# Patient Record
Sex: FEMALE | Race: WHITE | NOT HISPANIC OR LATINO | Employment: FULL TIME | URBAN - METROPOLITAN AREA
[De-identification: names, ages, dates, MRNs, and addresses within clinical notes are randomized per-mention and may not be internally consistent; named-entity substitution may affect disease eponyms.]

---

## 2017-03-20 ENCOUNTER — OFFICE VISIT (OUTPATIENT)
Dept: FAMILY MEDICINE | Facility: CLINIC | Age: 27
End: 2017-03-20
Payer: COMMERCIAL

## 2017-03-20 VITALS
RESPIRATION RATE: 16 BRPM | TEMPERATURE: 98.4 F | OXYGEN SATURATION: 99 % | HEART RATE: 81 BPM | BODY MASS INDEX: 27.16 KG/M2 | HEIGHT: 65 IN | WEIGHT: 163 LBS | SYSTOLIC BLOOD PRESSURE: 120 MMHG | DIASTOLIC BLOOD PRESSURE: 80 MMHG

## 2017-03-20 DIAGNOSIS — R30.0 DYSURIA: Primary | ICD-10-CM

## 2017-03-20 DIAGNOSIS — Z01.419 WELL WOMAN EXAM WITH ROUTINE GYNECOLOGICAL EXAM: ICD-10-CM

## 2017-03-20 DIAGNOSIS — E03.9 HYPOTHYROIDISM, UNSPECIFIED TYPE: ICD-10-CM

## 2017-03-20 LAB
ALBUMIN UR-MCNC: NEGATIVE MG/DL
APPEARANCE UR: CLEAR
BILIRUB UR QL STRIP: NEGATIVE
COLOR UR AUTO: YELLOW
GLUCOSE UR STRIP-MCNC: NEGATIVE MG/DL
HGB UR QL STRIP: ABNORMAL
KETONES UR STRIP-MCNC: NEGATIVE MG/DL
LEUKOCYTE ESTERASE UR QL STRIP: NEGATIVE
MICRO REPORT STATUS: NORMAL
MUCOUS THREADS #/AREA URNS LPF: PRESENT /LPF
NITRATE UR QL: NEGATIVE
NON-SQ EPI CELLS #/AREA URNS LPF: ABNORMAL /LPF
PH UR STRIP: 6 PH (ref 5–7)
RBC #/AREA URNS AUTO: ABNORMAL /HPF (ref 0–2)
SP GR UR STRIP: >1.03 (ref 1–1.03)
SPECIMEN SOURCE: NORMAL
URN SPEC COLLECT METH UR: ABNORMAL
UROBILINOGEN UR STRIP-ACNC: 0.2 EU/DL (ref 0.2–1)
WBC #/AREA URNS AUTO: ABNORMAL /HPF (ref 0–2)
WET PREP SPEC: NORMAL

## 2017-03-20 PROCEDURE — 84443 ASSAY THYROID STIM HORMONE: CPT | Performed by: NURSE PRACTITIONER

## 2017-03-20 PROCEDURE — 99214 OFFICE O/P EST MOD 30 MIN: CPT | Performed by: NURSE PRACTITIONER

## 2017-03-20 PROCEDURE — 87210 SMEAR WET MOUNT SALINE/INK: CPT | Performed by: NURSE PRACTITIONER

## 2017-03-20 PROCEDURE — 81001 URINALYSIS AUTO W/SCOPE: CPT | Performed by: NURSE PRACTITIONER

## 2017-03-20 PROCEDURE — 36415 COLL VENOUS BLD VENIPUNCTURE: CPT | Performed by: NURSE PRACTITIONER

## 2017-03-20 RX ORDER — NORGESTIMATE AND ETHINYL ESTRADIOL 7DAYSX3 28
1 KIT ORAL DAILY
Qty: 90 TABLET | Refills: 3 | Status: SHIPPED | OUTPATIENT
Start: 2017-03-20 | End: 2018-02-26

## 2017-03-20 RX ORDER — NORGESTIMATE AND ETHINYL ESTRADIOL 7DAYSX3 28
1 KIT ORAL DAILY
Qty: 90 TABLET | Refills: 3 | Status: CANCELLED | OUTPATIENT
Start: 2017-03-20

## 2017-03-20 RX ORDER — ATOMOXETINE 80 MG/1
80 CAPSULE ORAL DAILY
Qty: 30 CAPSULE | Refills: 1 | COMMUNITY
Start: 2017-03-20 | End: 2017-08-25

## 2017-03-20 ASSESSMENT — ANXIETY QUESTIONNAIRES
1. FEELING NERVOUS, ANXIOUS, OR ON EDGE: MORE THAN HALF THE DAYS
5. BEING SO RESTLESS THAT IT IS HARD TO SIT STILL: NOT AT ALL
6. BECOMING EASILY ANNOYED OR IRRITABLE: NEARLY EVERY DAY
2. NOT BEING ABLE TO STOP OR CONTROL WORRYING: NEARLY EVERY DAY
7. FEELING AFRAID AS IF SOMETHING AWFUL MIGHT HAPPEN: MORE THAN HALF THE DAYS
3. WORRYING TOO MUCH ABOUT DIFFERENT THINGS: NEARLY EVERY DAY
IF YOU CHECKED OFF ANY PROBLEMS ON THIS QUESTIONNAIRE, HOW DIFFICULT HAVE THESE PROBLEMS MADE IT FOR YOU TO DO YOUR WORK, TAKE CARE OF THINGS AT HOME, OR GET ALONG WITH OTHER PEOPLE: VERY DIFFICULT
GAD7 TOTAL SCORE: 16

## 2017-03-20 ASSESSMENT — PATIENT HEALTH QUESTIONNAIRE - PHQ9: 5. POOR APPETITE OR OVEREATING: NEARLY EVERY DAY

## 2017-03-20 NOTE — PROGRESS NOTES
SUBJECTIVE:                                                    Gayathri Gomez is a 26 year old female who presents to clinic today for the following health issues:      Vaginal Symptoms      Duration: x 7 days    Description  burning and urgency, creamy discharge    Intensity:  moderate    Accompanying signs and symptoms (fever/dysuria/abdominal or back pain): None    History  Sexually active: yes, single partner, contraception - pill  Possibility of pregnancy: No  Recent antibiotic use: no     Precipitating or alleviating factors: None    Therapies tried and outcome: vagasil, with some relief   Gayathri is here with complaints of dysuria and vaginal discharge. Frustrated with previous providers that have misdiagnosed her in the past. Unsure if the discharge is from her period starting soon.   Sexually active with the same male partner for the past 8 years. Taking OCP and is considering stopping to try to get pregnant in the next 1-2 years.   In addition, started on levothyroxine and has not had her TSH level drawn in several years. Feels fine but is interested in the level.   Mental health issues managed by specialty.         Problem list and histories reviewed & adjusted, as indicated.  Additional history: none    Patient Active Problem List   Diagnosis     Anxiety     Social phobia     ADHD (attention deficit hyperactivity disorder)     Major depression in partial remission (H)     CARDIOVASCULAR SCREENING; LDL GOAL LESS THAN 160     History reviewed. No pertinent past surgical history.    Social History   Substance Use Topics     Smoking status: Current Some Day Smoker     Years: 5.00     Types: Cigarettes     Smokeless tobacco: Never Used     Alcohol use 0.0 oz/week     0 Standard drinks or equivalent per week      Comment: occ     Family History   Problem Relation Age of Onset     Breast Cancer Maternal Grandmother      Psychotic Disorder Mother      severe anxiety     Hypertension Father            Reviewed and  "updated as needed this visit by clinical staff       Reviewed and updated as needed this visit by Provider         ROS:  Constitutional, HEENT, cardiovascular, pulmonary, gi and gu systems are negative, except as otherwise noted.    OBJECTIVE:                                                    /80 (BP Location: Right arm, Patient Position: Chair, Cuff Size: Adult Large)  Pulse 81  Temp 98.4  F (36.9  C) (Oral)  Resp 16  Ht 5' 5\" (1.651 m)  Wt 163 lb (73.9 kg)  LMP 03/20/2017  SpO2 99%  Breastfeeding? No  BMI 27.12 kg/m2  Body mass index is 27.12 kg/(m^2).  GENERAL: healthy, alert and no distress  RESP: lungs clear to auscultation - no rales, rhonchi or wheezes  CV: regular rate and rhythm, normal S1 S2, no S3 or S4, no murmur, click or rub, no peripheral edema and peripheral pulses strong   (female): normal female external genitalia, normal urethral meatus, vaginal mucosa, normal cervix/adnexa/uterus without masses or discharge  MS: no gross musculoskeletal defects noted, no edema  PSYCH: mentation appears normal and anxious    Results for orders placed or performed in visit on 03/20/17 (from the past 24 hour(s))   *UA reflex to Microscopic and Culture ([facility] (except [place] and [place])   Result Value Ref Range    Color Urine Yellow     Appearance Urine Clear     Glucose Urine Negative NEG mg/dL    Bilirubin Urine Negative NEG    Ketones Urine Negative NEG mg/dL    Specific Gravity Urine >1.030 1.003 - 1.035    Blood Urine Small (A) NEG    pH Urine 6.0 5.0 - 7.0 pH    Protein Albumin Urine Negative NEG mg/dL    Urobilinogen Urine 0.2 0.2 - 1.0 EU/dL    Nitrite Urine Negative NEG    Leukocyte Esterase Urine Negative NEG    Source Midstream Urine    Urine Microscopic   Result Value Ref Range    WBC Urine O - 2 0 - 2 /HPF    RBC Urine 2-5 (A) 0 - 2 /HPF    Squamous Epithelial /LPF Urine Moderate (A) FEW /LPF    Mucous Urine Present (A) NEG /LPF   Wet prep   Result Value Ref " [place]    Specimen Description Vagina     Wet Prep       No yeast seen  No clue cells seen  No Trichomonas seen      Micro Report Status FINAL 03/20/2017         ASSESSMENT/PLAN:                                                            1. Dysuria  Urine shows trace blood and likely from her menses. Wet prep is within normal limits. Patient is reassured.   - *UA reflex to Microscopic and Culture ([facility] (except [place] and [place])  - Wet prep  - Urine Microscopic    2.Management of contraception  Last pap was less than one year ago. OCP refilled at this time.   - norgestim-eth estrad triphasic (TRI-PREVIFEM) 0.18/0.215/0.25 MG-35 MCG per tablet; Take 1 tablet by mouth daily  Dispense: 90 tablet; Refill: 3    3. Hypothyroidism, unspecified type  TSH level drawn today. Will adjust medication as needed.   - TSH with free T4 reflex    Follow up as needed. Recommended physical exam in May.     Irena Preciado, NP  [facility]

## 2017-03-20 NOTE — NURSING NOTE
"Chief Complaint   Patient presents with     UTI     Vaginal Problem     Blood Draw     check thyroid       Initial /80 (BP Location: Right arm, Patient Position: Chair, Cuff Size: Adult Large)  Pulse 81  Temp 98.4  F (36.9  C) (Oral)  Resp 16  Ht 5' 5\" (1.651 m)  Wt 163 lb (73.9 kg)  LMP 03/20/2017  SpO2 99%  Breastfeeding? No  BMI 27.12 kg/m2 Estimated body mass index is 27.12 kg/(m^2) as calculated from the following:    Height as of this encounter: 5' 5\" (1.651 m).    Weight as of this encounter: 163 lb (73.9 kg).  Medication Reconciliation: feli Cm CMA      "

## 2017-03-20 NOTE — MR AVS SNAPSHOT
"              After Visit Summary   3/20/2017    Gayathri Gomez    MRN: [id number]           Patient Information     Date Of Birth          1990        Visit Information        Provider Department      3/20/2017 1:00 PM Irena Preciado NP [facility]        Today's Diagnoses     Dysuria    -  1    Well woman exam with routine gynecological exam        Hypothyroidism, unspecified type           Follow-ups after your visit        Who to contact     If you have questions or need follow up information about today's clinic visit or your schedule please contact [facility] directly at [phone].  Normal or non-critical lab and imaging results will be communicated to you by Well[employer], letter or phone within 4 business days after the clinic has received the results. If you do not hear from us within 7 days, please contact the clinic through [employer] or phone. If you have a critical or abnormal lab result, we will notify you by phone as soon as possible.  Submit refill requests through [employer] or call your pharmacy and they will forward the refill request to us. Please allow 3 business days for your refill to be completed.          Additional Information About Your Visit        MyChart Information     [employer] gives you secure access to your electronic health record. If you see a primary care provider, you can also send messages to your care team and make appointments. If you have questions, please call your primary care clinic.  If you do not have a primary care provider, please call [phone] and they will assist you.        Care EveryWhere ID     This is your Care EveryWhere ID. This could be used by other organizations to access your [facility] medical records  [id number]        Your Vitals Were     Pulse Temperature Respirations Height Last Period Pulse Oximetry    81 98.4  F (36.9  C) (Oral) 16 5' 5\" (1.651 m) 03/20/2017 99%    Breastfeeding? BMI (Body Mass Index)                No " 27.12 kg/m2           Blood Pressure from Last 3 Encounters:   03/20/17 120/80   05/19/16 113/79   04/09/13 133/88    Weight from Last 3 Encounters:   03/20/17 163 lb (73.9 kg)   05/19/16 163 lb (73.9 kg)   04/09/13 163 lb (73.9 kg)              We Performed the Following     *UA reflex to Microscopic and Culture ([facility] (except [place] and [place])     TSH with free T4 reflex     Urine Microscopic     Wet prep          Where to get your medicines      These medications were sent to [facility]/pharmacy #[id number] - [place], [place] - [address]  [address] [address]    Hours:  [facility] Phone:  [phone]     norgestim-eth estrad triphasic 0.18/0.215/0.25 MG-35 MCG per tablet          Primary Care Provider Office Phone # Fax #    Irena Preciado [phone]       [facility] [address] [address]        Thank you!     Thank you for choosing [facility]  for your care. Our goal is always to provide you with excellent care. Hearing back from our patients is one way we can continue to improve our services. Please take a few minutes to complete the written survey that you may receive in the mail after your visit with us. Thank you!             Your Updated Medication List - Protect others around you: Learn how to safely use, store and throw away your medicines at www.disposemymeds.org.          This list is accurate as of: 3/20/17  2:02 PM.  Always use your most recent med list.                   Brand Name Dispense Instructions for use    doxycycline Monohydrate 100 MG Caps     14 capsule    Take 1 capsule (100 mg) by mouth 2 times daily       levothyroxine 25 mcg/mL Susp    SYNTHROID     Take 25 mcg by mouth every morning (before breakfast)       norgestim-eth estrad triphasic 0.18/0.215/0.25 MG-35 MCG per tablet    TRI-PREVIFEM    90 tablet    Take 1 tablet by mouth daily       PARoxetine 30  MG tablet    PAXIL    30 tablet    Take 1 tablet by mouth At Bedtime.       propranolol 60 MG 24 hr capsule    INDERAL LA    30 capsule    Take 1 capsule by mouth daily.       STRATTERA 80 MG capsule   Generic drug:  atomoxetine     30 capsule    Take 1 capsule (80 mg) by mouth daily

## 2017-03-21 LAB — TSH SERPL DL<=0.005 MIU/L-ACNC: 2.59 MU/L (ref 0.4–4)

## 2017-03-21 ASSESSMENT — PATIENT HEALTH QUESTIONNAIRE - PHQ9: SUM OF ALL RESPONSES TO PHQ QUESTIONS 1-9: 12

## 2017-03-21 ASSESSMENT — ANXIETY QUESTIONNAIRES: GAD7 TOTAL SCORE: 16

## 2017-04-11 PROBLEM — E03.9 HYPOTHYROIDISM: Status: ACTIVE | Noted: 2017-04-11

## 2017-06-26 ENCOUNTER — OFFICE VISIT (OUTPATIENT)
Dept: FAMILY MEDICINE | Facility: CLINIC | Age: 27
End: 2017-06-26
Payer: COMMERCIAL

## 2017-06-26 VITALS
BODY MASS INDEX: 30.86 KG/M2 | WEIGHT: 192 LBS | RESPIRATION RATE: 16 BRPM | HEIGHT: 66 IN | SYSTOLIC BLOOD PRESSURE: 112 MMHG | OXYGEN SATURATION: 98 % | HEART RATE: 84 BPM | DIASTOLIC BLOOD PRESSURE: 66 MMHG | TEMPERATURE: 97.9 F

## 2017-06-26 DIAGNOSIS — Z00.01 ENCOUNTER FOR ROUTINE ADULT MEDICAL EXAM WITH ABNORMAL FINDINGS: Primary | ICD-10-CM

## 2017-06-26 DIAGNOSIS — E66.3 OVERWEIGHT: ICD-10-CM

## 2017-06-26 DIAGNOSIS — F33.1 MODERATE EPISODE OF RECURRENT MAJOR DEPRESSIVE DISORDER (H): ICD-10-CM

## 2017-06-26 LAB
CHOLEST SERPL-MCNC: 246 MG/DL
GLUCOSE SERPL-MCNC: 111 MG/DL (ref 70–99)
HDLC SERPL-MCNC: 68 MG/DL
LDLC SERPL CALC-MCNC: 152 MG/DL
NONHDLC SERPL-MCNC: 178 MG/DL
TRIGL SERPL-MCNC: 131 MG/DL

## 2017-06-26 PROCEDURE — 99395 PREV VISIT EST AGE 18-39: CPT | Performed by: NURSE PRACTITIONER

## 2017-06-26 PROCEDURE — 82947 ASSAY GLUCOSE BLOOD QUANT: CPT | Performed by: NURSE PRACTITIONER

## 2017-06-26 PROCEDURE — 80061 LIPID PANEL: CPT | Performed by: NURSE PRACTITIONER

## 2017-06-26 PROCEDURE — 36415 COLL VENOUS BLD VENIPUNCTURE: CPT | Performed by: NURSE PRACTITIONER

## 2017-06-26 NOTE — PROGRESS NOTES
SUBJECTIVE:     CC: Gayathri Gomez is an 26 year old woman who presents for preventive health visit.     Healthy Habits:    Do you get at least three servings of calcium containing foods daily (dairy, green leafy vegetables, etc.)? yes    Amount of exercise or daily activities, outside of work: 1-2 day(s) per week    Problems taking medications regularly No    Medication side effects: No    Have you had an eye exam in the past two years? Yes. due    Do you see a dentist twice per year? yes    Do you have sleep apnea, excessive snoring or daytime drowsiness?no    Patient is here for complete physical exam but her main focus is to talk about depression and her weight. Patient struggled with an eating disorder starting at age 13. Struggled with anorexia and weight was as low as 88 pounds. Currently is having therapy with the psychologist still struggling with body image, unhealthy eating patterns and negative self talk. Sexually active with one male partner. Focus is to reduce her weight so that in the next 1-2 year she can become pregnant. Working the overnight shift at [employer].         Today's PHQ-2 Score:   PHQ-2 ( 1999 [employer]) 3/20/2017 2/5/2013   Q1: Little interest or pleasure in doing things 1 1   Q2: Feeling down, depressed or hopeless 2 0   PHQ-2 Score 3 1       Abuse: Current or Past(Physical, Sexual or Emotional)- No  Do you feel safe in your environment - Yes    Social History   Substance Use Topics     Smoking status: Current Some Day Smoker     Years: 5.00     Types: Cigarettes     Smokeless tobacco: Never Used     Alcohol use 0.0 oz/week     0 Standard drinks or equivalent per week      Comment: occ     The patient does not drink >3 drinks per day nor >7 drinks per week.    No results for input(s): CHOL, HDL, LDL, TRIG, CHOLHDLRATIO, NHDL in the last 55457 hours.    Reviewed orders with patient.  Reviewed health maintenance and updated orders accordingly - Yes    Mammo Decision  Support:  Mammogram not appropriate for this patient based on age.    Pertinent mammograms are reviewed under the imaging tab.  History of abnormal Pap smear: NO - age 21-29 PAP every 3 years recommended    Reviewed and updated as needed this visit by clinical staff         Reviewed and updated as needed this visit by Provider            ROS:  C: NEGATIVE for fever, chills, change in weight  I: NEGATIVE for worrisome rashes, moles or lesions  E: NEGATIVE for vision changes or irritation  ENT: NEGATIVE for ear, mouth and throat problems  R: NEGATIVE for significant cough or SOB  B: NEGATIVE for masses, tenderness or discharge  CV: NEGATIVE for chest pain, palpitations or peripheral edema  GI: NEGATIVE for nausea, abdominal pain, heartburn, or change in bowel habits  : NEGATIVE for unusual urinary or vaginal symptoms. Periods are regular.  M: NEGATIVE for significant arthralgias or myalgia  N: NEGATIVE for weakness, dizziness or paresthesias  P: NEGATIVE for changes in mood or affect    Problem list, Medication list, Allergies, and Medical/Social/Surgical histories reviewed in EPIC and updated as appropriate.  OBJECTIVE:     There were no vitals taken for this visit.  EXAM:  GENERAL: healthy, alert and over weight  RESP: lungs clear to auscultation - no rales, rhonchi or wheezes  CV: regular rate and rhythm, normal S1 S2, no S3 or S4, no murmur, click or rub, no peripheral edema and peripheral pulses strong  ABDOMEN: soft, nontender, no hepatosplenomegaly, no masses and bowel sounds normal  MS: no gross musculoskeletal defects noted, no edema  SKIN: no suspicious lesions or rashes  NEURO: Normal strength and tone, mentation intact and speech normal  PSYCH: mentation appears normal, affect normal/bright    ASSESSMENT/PLAN:     1. Encounter for routine adult medical exam with abnormal findings  Fasting labs drawn today. No pap is due.   - Lipid panel reflex to direct LDL  - Glucose    2. Moderate episode of recurrent  "major depressive disorder (H)  Encourage patient to continue with psychotherapy. Advised patient to consider changing from Paxil to possibly Wellbutrin. Patient is reluctant is very concerned about worsening mood. Lack of exercise and withdrawn from social activities is indicative of poorly controlled depression. Emphasis on making small but significant changes. Advised to follow-up as needed.    3. Overweight   We have determined that an appropriate goal would be to exercise for 1 hour 5 times per week. Patient does have a history of anorexia and excessive exercise up to 4 hours per day.      COUNSELING:   Reviewed preventive health counseling, as reflected in patient instructions       Regular exercise       Healthy diet/nutrition       Family planning       Safe sex practices/STD prevention         reports that she has been smoking Cigarettes.  She has smoked for the past 5.00 years. She has never used smokeless tobacco.  Tobacco Cessation Action Plan: Information offered: Patient not interested at this time  Estimated body mass index is 27.12 kg/(m^2) as calculated from the following:    Height as of 3/20/17: 5' 5\" (1.651 m).    Weight as of 3/20/17: 163 lb (73.9 kg).   Weight management plan: Discussed healthy diet and exercise guidelines and patient will follow up in 6 months in clinic to re-evaluate.    Counseling Resources:  ATP IV Guidelines  Pooled Cohorts Equation Calculator  Breast Cancer Risk Calculator  FRAX Risk Assessment  ICSI Preventive Guidelines  Dietary Guidelines for Americans, 2010  USDA's MyPlate  ASA Prophylaxis  Lung CA Screening    Irena Preciado, NP  [facility]  "

## 2017-06-26 NOTE — MR AVS SNAPSHOT
After Visit Summary   6/26/2017    Gayathri Gomez    MRN: [id number]           Patient Information     Date Of Birth          1990        Visit Information        Provider Department      6/26/2017 8:00 AM Irena Preciado NP [facility]        Today's Diagnoses     Encounter for routine adult medical exam with abnormal findings    -  1    Major depression in partial remission (H)          Care Instructions      Preventive Health Recommendations  Female Ages 26 - 39  Yearly exam:   See your health care provider every year in order to    Review health changes.     Discuss preventive care.      Review your medicines if you your doctor has prescribed any.    Until age 30: Get a Pap test every three years (more often if you have had an abnormal result).    After age 30: Talk to your doctor about whether you should have a Pap test every 3 years or have a Pap test with HPV screening every 5 years.   You do not need a Pap test if your uterus was removed (hysterectomy) and you have not had cancer.  You should be tested each year for STDs (sexually transmitted diseases), if you're at risk.   Talk to your provider about how often to have your cholesterol checked.  If you are at risk for diabetes, you should have a diabetes test (fasting glucose).  Shots: Get a flu shot each year. Get a tetanus shot every 10 years.   Nutrition:     Eat at least 5 servings of fruits and vegetables each day.    Eat whole-grain bread, whole-wheat pasta and brown rice instead of white grains and rice.    Talk to your provider about Calcium and Vitamin D.     Lifestyle    Exercise at least 150 minutes a week (30 minutes a day, 5 days of the week). This will help you control your weight and prevent disease.    Limit alcohol to one drink per day.    No smoking.     Wear sunscreen to prevent skin cancer.    See your dentist every six months for an exam and cleaning.            Follow-ups after your visit        Who to  "contact     If you have questions or need follow up information about today's clinic visit or your schedule please contact [facility] directly at [phone].  Normal or non-critical lab and imaging results will be communicated to you by MyChart, letter or phone within 4 business days after the clinic has received the results. If you do not hear from us within 7 days, please contact the clinic through 1[employer] Systemshart or phone. If you have a critical or abnormal lab result, we will notify you by phone as soon as possible.  Submit refill requests through [employer] or call your pharmacy and they will forward the refill request to us. Please allow 3 business days for your refill to be completed.          Additional Information About Your Visit        [employer] Information     [employer] gives you secure access to your electronic health record. If you see a primary care provider, you can also send messages to your care team and make appointments. If you have questions, please call your primary care clinic.  If you do not have a primary care provider, please call [phone] and they will assist you.        Care EveryWhere ID     This is your Care EveryWhere ID. This could be used by other organizations to access your [facility] medical records  [id number]        Your Vitals Were     Pulse Temperature Respirations Height Last Period Pulse Oximetry    84 97.9  F (36.6  C) (Oral) 16 5' 6\" (1.676 m) 06/12/2017 (Approximate) 98%    Breastfeeding? BMI (Body Mass Index)                No 30.99 kg/m2           Blood Pressure from Last 3 Encounters:   06/26/17 112/66   03/20/17 120/80   05/19/16 113/79    Weight from Last 3 Encounters:   06/26/17 192 lb (87.1 kg)   03/20/17 163 lb (73.9 kg)   05/19/16 163 lb (73.9 kg)              We Performed the Following     DEPRESSION ACTION PLAN (DAP)     Glucose     Lipid panel reflex to direct LDL        Primary Care Provider Office Phone # Fax #    Irena Preciado [phone] " [phone]       [facility] [address]  [facility] [address]        Equal Access to Services     LITA ALTAMIRANO : Hadii aron vela hadmaycoo Sorickali, waaxda luqadaha, qaybta kaalmada adechadda, kareen ryanin hayaaxiao schmidtang thomson karie painter. So [facility] [phone].    ATENCIÓN: Si habla español, tiene a renteria disposición servicios gratuitos de asistencia lingüística. Peng al [phone].    We comply with applicable federal civil rights laws and [place] laws. We do not discriminate on the basis of race, color, national origin, age, disability sex, sexual orientation or gender identity.            Thank you!     Thank you for choosing [facility]  for your care. Our goal is always to provide you with excellent care. Hearing back from our patients is one way we can continue to improve our services. Please take a few minutes to complete the written survey that you may receive in the mail after your visit with us. Thank you!             Your Updated Medication List - Protect others around you: Learn how to safely use, store and throw away your medicines at www.disposemymeds.org.          This list is accurate as of: 6/26/17  9:07 AM.  Always use your most recent med list.                   Brand Name Dispense Instructions for use Diagnosis    levothyroxine 25 MCG tablet    SYNTHROID/LEVOTHROID    90 tablet    Take 1 tablet (25 mcg) by mouth daily    Hypothyroidism, unspecified type       norgestim-eth estrad triphasic 0.18/0.215/0.25 MG-35 MCG per tablet    TRI-PREVIFEM    90 tablet    Take 1 tablet by mouth daily    Well woman exam with routine gynecological exam       PARoxetine 30 MG tablet    PAXIL    30 tablet    Take 1 tablet by mouth At Bedtime.    Anxiety, Major depression in partial remission (H), Social phobia       propranolol 60 MG 24 hr capsule    INDERAL LA    30 capsule    Take 1 capsule by mouth daily.    Social phobia       STRATTERA 80 MG capsule   Generic drug:  atomoxetine     30  capsule    Take 1 capsule (80 mg) by mouth daily

## 2017-06-26 NOTE — NURSING NOTE
"Chief Complaint   Patient presents with     Physical     discuss acid reflux     Blood Draw     IS fasting       Initial /66 (BP Location: Right arm, Patient Position: Sitting, Cuff Size: Adult Regular)  Pulse 84  Temp 97.9  F (36.6  C) (Oral)  Resp 16  Ht 5' 6\" (1.676 m)  Wt 192 lb (87.1 kg)  LMP 06/12/2017 (Approximate)  SpO2 98%  Breastfeeding? No  BMI 30.99 kg/m2 Estimated body mass index is 30.99 kg/(m^2) as calculated from the following:    Height as of this encounter: 5' 6\" (1.676 m).    Weight as of this encounter: 192 lb (87.1 kg).  Medication Reconciliation: feli Cm CMA      "

## 2017-06-26 NOTE — LETTER
My Depression Action Plan  Name: Gayathri Gomez   Date of Birth 1990  Date: 6/26/2017    My doctor: Irena Preciado   My clinic: [address] [address] [phone]          GREEN    ZONE   Good Control    What it looks like:     Things are going generally well. You have normal up s and down s. You may even feel depressed from time to time, but bad moods usually last less than a day.   What you need to do:  1. Continue to care for yourself (see self care plan)  2. Check your depression survival kit and update it as needed  3. Follow your physician s recommendations including any medication.  4. Do not stop taking medication unless you consult with your physician first.           YELLOW         ZONE Getting Worse    What it looks like:     Depression is starting to interfere with your life.     It may be hard to get out of bed; you may be starting to isolate yourself from others.    Symptoms of depression are starting to last most all day and this has happened for several days.     You may have suicidal thoughts but they are not constant.   What you need to do:     1. Call your care team, your response to treatment will improve if you keep your care team informed of your progress. Yellow periods are signs an adjustment may need to be made.     2. Continue your self-care, even if you have to fake it!    3. Talk to someone in your support network    4. Open up your depression survival kit           RED    ZONE Medical Alert - Get Help    What it looks like:     Depression is seriously interfering with your life.     You may experience these or other symptoms: You can t get out of bed most days, can t work or engage in other necessary activities, you have trouble taking care of basic hygiene, or basic responsibilities, thoughts of suicide or death that will not go away, self-injurious behavior.     What you need to do:  1. Call your care team and  request a same-day appointment. If they are not available (weekends or after hours) call your local crisis line, emergency room or 911.      Electronically signed by: Robin Cm, June 26, 2017    Depression Self Care Plan / Survival Kit    Self-Care for Depression  Here s the deal. Your body and mind are really not as separate as most people think.  What you do and think affects how you feel and how you feel influences what you do and think. This means if you do things that people who feel good do, it will help you feel better.  Sometimes this is all it takes.  There is also a place for medication and therapy depending on how severe your depression is, so be sure to consult with your medical provider and/ or Behavioral Health Consultant if your symptoms are worsening or not improving.     In order to better manage my stress, I will:    Exercise  Get some form of exercise, every day. This will help reduce pain and release endorphins, the  feel good  chemicals in your brain. This is almost as good as taking antidepressants!  This is not the same as joining a gym and then never going! (they count on that by the way ) It can be as simple as just going for a walk or doing some gardening, anything that will get you moving.      Hygiene   Maintain good hygiene (Get out of bed in the morning, Make your bed, Brush your teeth, Take a shower, and Get dressed like you were going to work, even if you are unemployed).  If your clothes don't fit try to get ones that do.    Diet  I will strive to eat foods that are good for me, drink plenty of water, and avoid excessive sugar, caffeine, alcohol, and other mood-altering substances.  Some foods that are helpful in depression are: complex carbohydrates, B vitamins, flaxseed, fish or fish oil, fresh fruits and vegetables.    Psychotherapy  I agree to participate in Individual Therapy (if recommended).    Medication  If prescribed medications, I agree to take them.  Missing doses  can result in serious side effects.  I understand that drinking alcohol, or other illicit drug use, may cause potential side effects.  I will not stop my medication abruptly without first discussing it with my provider.    Staying Connected With Others  I will stay in touch with my friends, family members, and my primary care provider/team.    Use your imagination  Be creative.  We all have a creative side; it doesn t matter if it s oil painting, sand castles, or mud pies! This will also kick up the endorphins.    Witness Beauty  (AKA stop and smell the roses) Take a look outside, even in mid-winter. Notice colors, textures. Watch the squirrels and birds.     Service to others  Be of service to others.  There is always someone else in need.  By helping others we can  get out of ourselves  and remember the really important things.  This also provides opportunities for practicing all the other parts of the program.    Humor  Laugh and be silly!  Adjust your TV habits for less news and crime-drama and more comedy.    Control your stress  Try breathing deep, massage therapy, biofeedback, and meditation. Find time to relax each day.     My support system    Clinic Contact:  Phone number:    Contact 1:  Phone number:    Contact 2:  Phone number:    Buddhist/:  Phone number:    Therapist:  Phone number:    Local crisis center:    Phone number:    Other community support:  Phone number:

## 2017-08-25 ENCOUNTER — OFFICE VISIT (OUTPATIENT)
Dept: FAMILY MEDICINE | Facility: CLINIC | Age: 27
End: 2017-08-25
Payer: COMMERCIAL

## 2017-08-25 ENCOUNTER — TELEPHONE (OUTPATIENT)
Dept: FAMILY MEDICINE | Facility: CLINIC | Age: 27
End: 2017-08-25

## 2017-08-25 VITALS
HEIGHT: 66 IN | WEIGHT: 192 LBS | DIASTOLIC BLOOD PRESSURE: 70 MMHG | HEART RATE: 87 BPM | SYSTOLIC BLOOD PRESSURE: 118 MMHG | OXYGEN SATURATION: 100 % | BODY MASS INDEX: 30.86 KG/M2 | TEMPERATURE: 98.7 F | RESPIRATION RATE: 17 BRPM

## 2017-08-25 DIAGNOSIS — F90.9 ATTENTION DEFICIT HYPERACTIVITY DISORDER (ADHD), UNSPECIFIED ADHD TYPE: Primary | ICD-10-CM

## 2017-08-25 DIAGNOSIS — K21.9 GASTROESOPHAGEAL REFLUX DISEASE WITHOUT ESOPHAGITIS: ICD-10-CM

## 2017-08-25 PROCEDURE — 99213 OFFICE O/P EST LOW 20 MIN: CPT | Performed by: NURSE PRACTITIONER

## 2017-08-25 RX ORDER — METHYLPHENIDATE HYDROCHLORIDE 27 MG/1
27 TABLET ORAL EVERY MORNING
Qty: 30 TABLET | Refills: 0 | Status: SHIPPED | OUTPATIENT
Start: 2017-08-25 | End: 2017-10-23 | Stop reason: DRUGHIGH

## 2017-08-25 ASSESSMENT — ANXIETY QUESTIONNAIRES
7. FEELING AFRAID AS IF SOMETHING AWFUL MIGHT HAPPEN: NOT AT ALL
2. NOT BEING ABLE TO STOP OR CONTROL WORRYING: SEVERAL DAYS
IF YOU CHECKED OFF ANY PROBLEMS ON THIS QUESTIONNAIRE, HOW DIFFICULT HAVE THESE PROBLEMS MADE IT FOR YOU TO DO YOUR WORK, TAKE CARE OF THINGS AT HOME, OR GET ALONG WITH OTHER PEOPLE: SOMEWHAT DIFFICULT
GAD7 TOTAL SCORE: 2
5. BEING SO RESTLESS THAT IT IS HARD TO SIT STILL: NOT AT ALL
1. FEELING NERVOUS, ANXIOUS, OR ON EDGE: NOT AT ALL
3. WORRYING TOO MUCH ABOUT DIFFERENT THINGS: SEVERAL DAYS
6. BECOMING EASILY ANNOYED OR IRRITABLE: NOT AT ALL

## 2017-08-25 ASSESSMENT — PATIENT HEALTH QUESTIONNAIRE - PHQ9: 5. POOR APPETITE OR OVEREATING: NOT AT ALL

## 2017-08-25 NOTE — MR AVS SNAPSHOT
"              After Visit Summary   8/25/2017    Gayathri Gomez    MRN: [id number]           Patient Information     Date Of Birth          1990        Visit Information        Provider Department      8/25/2017 11:00 AM Irena Preciado NP [facility]        Today's Diagnoses     Attention deficit hyperactivity disorder (ADHD), unspecified ADHD type    -  1    Gastroesophageal reflux disease without esophagitis           Follow-ups after your visit        Who to contact     If you have questions or need follow up information about today's clinic visit or your schedule please contact [facility] directly at [phone].  Normal or non-critical lab and imaging results will be communicated to you by MyChart, letter or phone within 4 business days after the clinic has received the results. If you do not hear from us within 7 days, please contact the clinic through [employer] or phone. If you have a critical or abnormal lab result, we will notify you by phone as soon as possible.  Submit refill requests through [employer] or call your pharmacy and they will forward the refill request to us. Please allow 3 business days for your refill to be completed.          Additional Information About Your Visit        MyChart Information     [employer] gives you secure access to your electronic health record. If you see a primary care provider, you can also send messages to your care team and make appointments. If you have questions, please call your primary care clinic.  If you do not have a primary care provider, please call [phone] and they will assist you.        Care EveryWhere ID     This is your Care EveryWhere ID. This could be used by other organizations to access your [facility] medical records  [id number]        Your Vitals Were     Pulse Temperature Respirations Height Last Period Pulse Oximetry    87 98.7  F (37.1  C) (Oral) 17 5' 6\" (1.676 m) 08/18/2017 (Approximate) 100%    " Breastfeeding? BMI (Body Mass Index)                No 30.99 kg/m2           Blood Pressure from Last 3 Encounters:   08/25/17 118/70   06/26/17 112/66   03/20/17 120/80    Weight from Last 3 Encounters:   08/25/17 192 lb (87.1 kg)   06/26/17 192 lb (87.1 kg)   03/20/17 163 lb (73.9 kg)              Today, you had the following     No orders found for display         Today's Medication Changes          These changes are accurate as of: 8/25/17 11:32 AM.  If you have any questions, ask your nurse or doctor.               Start taking these medicines.        Dose/Directions    methylphenidate ER 27 MG CR tablet   Commonly known as:  CONCERTA   Used for:  Attention deficit hyperactivity disorder (ADHD), unspecified ADHD type   Started by:  Irena Preciado NP        Dose:  27 mg   Take 1 tablet (27 mg) by mouth every morning   Quantity:  30 tablet   Refills:  0       omeprazole 20 MG CR capsule   Commonly known as:  priLOSEC   Used for:  Gastroesophageal reflux disease without esophagitis   Started by:  Irena Preciado NP        Dose:  20 mg   Take 1 capsule (20 mg) by mouth 2 times daily   Quantity:  60 capsule   Refills:  1            Where to get your medicines      These medications were sent to [facility]/pharmacy #[id number] - [place], [place] - [address] [facility]  [address] [facility] [address]    Hours:  [facility] Phone:  [phone]     omeprazole 20 MG CR capsule         Some of these will need a paper prescription and others can be bought over the counter.  Ask your nurse if you have questions.     Bring a paper prescription for each of these medications     methylphenidate ER 27 MG CR tablet                Primary Care Provider Office Phone # Fax #    Irena Preciado [phone]       [facility] [address] [address] [facility] [address]        Equal Access to Services     LITA ALTAMIRANO AH: Garrison Goldsmith, narciso desouza, kareen rapp  anastasiya stevenskatie baig'aan ah. So [facility] [phone].    ATENCIÓN: Si flavia canales, tiene a renteria disposición servicios gratuitos de asistencia lingüística. Peng al [phone].    We comply with applicable federal civil rights laws and [place] laws. We do not discriminate on the basis of race, color, national origin, age, disability sex, sexual orientation or gender identity.            Thank you!     Thank you for choosing [facility]  for your care. Our goal is always to provide you with excellent care. Hearing back from our patients is one way we can continue to improve our services. Please take a few minutes to complete the written survey that you may receive in the mail after your visit with us. Thank you!             Your Updated Medication List - Protect others around you: Learn how to safely use, store and throw away your medicines at www.disposemymeds.org.          This list is accurate as of: 8/25/17 11:32 AM.  Always use your most recent med list.                   Brand Name Dispense Instructions for use Diagnosis    levothyroxine 25 MCG tablet    SYNTHROID/LEVOTHROID    90 tablet    Take 1 tablet (25 mcg) by mouth daily    Hypothyroidism, unspecified type       methylphenidate ER 27 MG CR tablet    CONCERTA    30 tablet    Take 1 tablet (27 mg) by mouth every morning    Attention deficit hyperactivity disorder (ADHD), unspecified ADHD type       norgestim-eth estrad triphasic 0.18/0.215/0.25 MG-35 MCG per tablet    TRI-PREVIFEM    90 tablet    Take 1 tablet by mouth daily    Well woman exam with routine gynecological exam       omeprazole 20 MG CR capsule    priLOSEC    60 capsule    Take 1 capsule (20 mg) by mouth 2 times daily    Gastroesophageal reflux disease without esophagitis       PARoxetine 30 MG tablet    PAXIL    30 tablet    Take 1 tablet by mouth At Bedtime.    Anxiety, Major depression in partial remission (H), Social phobia       propranolol 60 MG 24 hr capsule     INDERAL LA    30 capsule    Take 1 capsule by mouth daily.    Social phobia

## 2017-08-25 NOTE — PROGRESS NOTES
SUBJECTIVE:   Gayathri Gomez is a 26 year old female who presents to clinic today for the following health issues:      Medication Followup and discuss acid reflux    Taking Medication as prescribed: yes    Side Effects:  Discuss Starterra    Medication Helping Symptoms:  yes     Patient is here to discuss transferring care to this clinic. Has long-standing issues with depression and anxiety and is requesting that her medications be reordered in the near future. Has struggled with depression, eating disorder, ADHD and low mood. Is frustrated with her weight gain but is back working out doing boxing several times per week and starting to feel better.    Issues with GERD and has been taking over-the-counter medication and taking sporadically. Interested in prescription strength medication and to discuss options. Having reflux symptoms in the middle of the night. Try to monitor her diet to avoid spicy foods and acidic foods.    Would like to discuss going back on Concerta. Had been on Concerta many years ago and found it effective but the dosing was too high. Was switched to Strattera but has found that the dosing is ineffective and still struggling with concentration issues. Interested in going back Concerta lower dose.          Problem list and histories reviewed & adjusted, as indicated.  Additional history: none    Patient Active Problem List   Diagnosis     Anxiety     Social phobia     ADHD (attention deficit hyperactivity disorder)     Major depression in partial remission (H)     CARDIOVASCULAR SCREENING; LDL GOAL LESS THAN 160     Hypothyroidism     History reviewed. No pertinent surgical history.    Social History   Substance Use Topics     Smoking status: Current Some Day Smoker     Years: 5.00     Types: Cigarettes     Smokeless tobacco: Never Used     Alcohol use 0.0 oz/week     0 Standard drinks or equivalent per week      Comment: occ     Family History   Problem Relation Age of Onset     Breast Cancer  "Maternal Grandmother      Psychotic Disorder Mother      severe anxiety     Hypertension Father              Reviewed and updated as needed this visit by clinical staffTobacco  Allergies  Meds  Problems  Med Hx  Surg Hx  Fam Hx  Soc Hx        Reviewed and updated as needed this visit by Provider  Allergies  Meds  Problems  Med Hx  Surg Hx  Fam Hx         ROS:  Constitutional, HEENT, cardiovascular, pulmonary, gi and gu systems are negative, except as otherwise noted.      OBJECTIVE:   /70 (BP Location: Right arm, Patient Position: Chair, Cuff Size: Adult Regular)  Pulse 87  Temp 98.7  F (37.1  C) (Oral)  Resp 17  Ht 5' 6\" (1.676 m)  Wt 192 lb (87.1 kg)  LMP 08/18/2017 (Approximate)  SpO2 100%  Breastfeeding? No  BMI 30.99 kg/m2  Body mass index is 30.99 kg/(m^2).  GENERAL: healthy, alert and no distress  EYES: Eyes grossly normal to inspection, PERRL and conjunctivae and sclerae normal  HENT: ear canals and TM's normal, nose and mouth without ulcers or lesions  NECK: no adenopathy, no asymmetry, masses, or scars and thyroid normal to palpation  RESP: lungs clear to auscultation - no rales, rhonchi or wheezes  CV: regular rate and rhythm, normal S1 S2, no S3 or S4, no murmur, click or rub, no peripheral edema and peripheral pulses strong  MS: no gross musculoskeletal defects noted, no edema  SKIN: no suspicious lesions or rashes  NEURO: Normal strength and tone, mentation intact and speech normal  PSYCH: mentation appears normal, affect normal/bright        ASSESSMENT/PLAN:         1. Attention deficit hyperactivity disorder (ADHD), unspecified ADHD type   Will stop Strattera and start patient on Concerta. Will need to follow up in 1 month.  - methylphenidate ER (CONCERTA) 27 MG CR tablet; Take 1 tablet (27 mg) by mouth every morning  Dispense: 30 tablet; Refill: 0    2. Gastroesophageal reflux disease without esophagitis   Starting patient on omeprazole twice daily.  - omeprazole " (PRILOSEC) 20 MG CR capsule; Take 1 capsule (20 mg) by mouth 2 times daily  Dispense: 60 capsule; Refill: 1    Follow-up in 1 month.    Irena Preciado NP  [facility]

## 2017-08-25 NOTE — TELEPHONE ENCOUNTER
PA faxed for methylphenidate ER (CONCERTA) 27 MG KEVIN Hidalgo    Phone@ [phone]  Patient ID# [id number]    Jose PURVIS

## 2017-08-25 NOTE — NURSING NOTE
"Chief Complaint   Patient presents with     Recheck Medication     discuss acid reflux       Initial /70 (BP Location: Right arm, Patient Position: Chair, Cuff Size: Adult Regular)  Pulse 87  Temp 98.7  F (37.1  C) (Oral)  Resp 17  Ht 5' 6\" (1.676 m)  Wt 192 lb (87.1 kg)  LMP 08/18/2017 (Approximate)  SpO2 100%  Breastfeeding? No  BMI 30.99 kg/m2 Estimated body mass index is 30.99 kg/(m^2) as calculated from the following:    Height as of this encounter: 5' 6\" (1.676 m).    Weight as of this encounter: 192 lb (87.1 kg).  Medication Reconciliation: complete     Robin Cm CMA      "

## 2017-08-26 ASSESSMENT — ANXIETY QUESTIONNAIRES: GAD7 TOTAL SCORE: 2

## 2017-09-12 DIAGNOSIS — F40.10 SOCIAL PHOBIA: ICD-10-CM

## 2017-09-12 RX ORDER — PROPRANOLOL HCL 60 MG
60 CAPSULE, EXTENDED RELEASE 24HR ORAL DAILY
Qty: 30 CAPSULE | Refills: 5 | Status: SHIPPED | OUTPATIENT
Start: 2017-09-12 | End: 2018-04-18

## 2017-09-12 NOTE — TELEPHONE ENCOUNTER
Propranolol      Last Written Prescription Date: 04/09/2013  Last Fill Quantity: 30, # refills: 5    Last Office Visit with [facility] or [facility] prescribing provider:  08/25/2017   Future Office Visit:        BP Readings from Last 3 Encounters:   08/25/17 118/70   06/26/17 112/66   03/20/17 120/80     Patient is out of the medication, Irena has never filled this for her. I confirmed the dosing and directions.    Corazon Patel

## 2017-10-23 ENCOUNTER — OFFICE VISIT (OUTPATIENT)
Dept: FAMILY MEDICINE | Facility: CLINIC | Age: 27
End: 2017-10-23
Payer: COMMERCIAL

## 2017-10-23 VITALS
BODY MASS INDEX: 30.86 KG/M2 | HEART RATE: 74 BPM | RESPIRATION RATE: 16 BRPM | SYSTOLIC BLOOD PRESSURE: 114 MMHG | TEMPERATURE: 97.1 F | HEIGHT: 66 IN | WEIGHT: 192 LBS | DIASTOLIC BLOOD PRESSURE: 66 MMHG | OXYGEN SATURATION: 99 %

## 2017-10-23 DIAGNOSIS — F41.9 ANXIETY: ICD-10-CM

## 2017-10-23 DIAGNOSIS — F40.10 SOCIAL PHOBIA: ICD-10-CM

## 2017-10-23 DIAGNOSIS — F90.9 ATTENTION DEFICIT HYPERACTIVITY DISORDER (ADHD), UNSPECIFIED ADHD TYPE: ICD-10-CM

## 2017-10-23 DIAGNOSIS — F33.8 SEASONAL AFFECTIVE DISORDER (H): Primary | ICD-10-CM

## 2017-10-23 DIAGNOSIS — F33.41 RECURRENT MAJOR DEPRESSIVE DISORDER, IN PARTIAL REMISSION (H): ICD-10-CM

## 2017-10-23 PROCEDURE — 99214 OFFICE O/P EST MOD 30 MIN: CPT | Performed by: NURSE PRACTITIONER

## 2017-10-23 RX ORDER — METHYLPHENIDATE HYDROCHLORIDE 36 MG/1
36 TABLET ORAL EVERY MORNING
Qty: 30 TABLET | Refills: 0 | Status: SHIPPED | OUTPATIENT
Start: 2017-10-23 | End: 2017-12-13

## 2017-10-23 RX ORDER — PAROXETINE 30 MG/1
30 TABLET, FILM COATED ORAL AT BEDTIME
Qty: 30 TABLET | Refills: 5 | Status: SHIPPED | OUTPATIENT
Start: 2017-10-23 | End: 2017-11-02

## 2017-10-23 ASSESSMENT — ANXIETY QUESTIONNAIRES
IF YOU CHECKED OFF ANY PROBLEMS ON THIS QUESTIONNAIRE, HOW DIFFICULT HAVE THESE PROBLEMS MADE IT FOR YOU TO DO YOUR WORK, TAKE CARE OF THINGS AT HOME, OR GET ALONG WITH OTHER PEOPLE: SOMEWHAT DIFFICULT
2. NOT BEING ABLE TO STOP OR CONTROL WORRYING: MORE THAN HALF THE DAYS
1. FEELING NERVOUS, ANXIOUS, OR ON EDGE: SEVERAL DAYS
7. FEELING AFRAID AS IF SOMETHING AWFUL MIGHT HAPPEN: NOT AT ALL
1. FEELING NERVOUS, ANXIOUS, OR ON EDGE: SEVERAL DAYS
5. BEING SO RESTLESS THAT IT IS HARD TO SIT STILL: NOT AT ALL
GAD7 TOTAL SCORE: 6
3. WORRYING TOO MUCH ABOUT DIFFERENT THINGS: MORE THAN HALF THE DAYS
5. BEING SO RESTLESS THAT IT IS HARD TO SIT STILL: NOT AT ALL
7. FEELING AFRAID AS IF SOMETHING AWFUL MIGHT HAPPEN: NOT AT ALL
3. WORRYING TOO MUCH ABOUT DIFFERENT THINGS: MORE THAN HALF THE DAYS
6. BECOMING EASILY ANNOYED OR IRRITABLE: SEVERAL DAYS
6. BECOMING EASILY ANNOYED OR IRRITABLE: SEVERAL DAYS
IF YOU CHECKED OFF ANY PROBLEMS ON THIS QUESTIONNAIRE, HOW DIFFICULT HAVE THESE PROBLEMS MADE IT FOR YOU TO DO YOUR WORK, TAKE CARE OF THINGS AT HOME, OR GET ALONG WITH OTHER PEOPLE: SOMEWHAT DIFFICULT
2. NOT BEING ABLE TO STOP OR CONTROL WORRYING: MORE THAN HALF THE DAYS
GAD7 TOTAL SCORE: 6

## 2017-10-23 ASSESSMENT — PATIENT HEALTH QUESTIONNAIRE - PHQ9
SUM OF ALL RESPONSES TO PHQ QUESTIONS 1-9: 8
5. POOR APPETITE OR OVEREATING: NOT AT ALL
5. POOR APPETITE OR OVEREATING: NOT AT ALL

## 2017-10-23 NOTE — NURSING NOTE
"Chief Complaint   Patient presents with     Recheck Medication       Initial /66 (BP Location: Right arm, Patient Position: Chair, Cuff Size: Adult Regular)  Pulse 74  Temp 97.1  F (36.2  C) (Oral)  Resp 16  Ht 5' 6\" (1.676 m)  Wt 192 lb (87.1 kg)  LMP 10/09/2017  SpO2 99%  Breastfeeding? No  BMI 30.99 kg/m2 Estimated body mass index is 30.99 kg/(m^2) as calculated from the following:    Height as of this encounter: 5' 6\" (1.676 m).    Weight as of this encounter: 192 lb (87.1 kg).  Medication Reconciliation: complete     Robin Cm CMA      "

## 2017-10-23 NOTE — PROGRESS NOTES
ADHD    Onset: since childhood     Description:   Easily distracted: YES    Short attention span: yes    Trouble following directions: YES     Impulsive behavior: YES     Trouble completing tasks: YES      Accompanying Signs & Symptoms:        Change in sleep pattern: yes  Irritability at certain times of the day: with slow people  Socially withdrawn: no  Depression symptoms: YES    Anxiety symptoms: YES      History:  Caffeine intake: Moderate  Loss of appetite: no  Healthy diet: could be better  Did you have problems in school or with previous employment: YES    Family history of ADHD: YES    Have you had an evaluation for ADHD in the past: YES    Do you use alcohol or drugs: no  Therapies tried: Adderall (concerta) with moderate relief  SUBJECTIVE:   Gayathri Gomez is a 27 year old female who presents to clinic today for the following health issues:      Medication Followup and discuss dose    Taking Medication as prescribed: yes    Side Effects:  Will discuss    Medication Helping Symptoms:  At times    Here to follow-up on use of Concerta for ADHD. Patient was previously on Strattera and found it more effective in managing her ADHD symptoms but has struggled with irritability, change in appetite and worsening mood. Patient was changed to Concerta and although the dose is lower than previous she is tolerating better. Is interested in increasing the dose slightly in an attempt to better manage her lack of concentration. 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    Recently has started struggling with worsening seasonal affective disorder. Symptoms include worsening fatigue, lack of motivation, lethargy and low mood. Is interested in getting an SAD light.          Problem list and histories reviewed & adjusted, as indicated.  Additional history: none    Patient Active Problem List   Diagnosis     Anxiety     Social phobia     ADHD (attention deficit hyperactivity  "disorder)     Major depression in partial remission (H)     CARDIOVASCULAR SCREENING; LDL GOAL LESS THAN 160     Hypothyroidism     History reviewed. No pertinent surgical history.    Social History   Substance Use Topics     Smoking status: Current Some Day Smoker     Years: 5.00     Types: Cigarettes     Smokeless tobacco: Never Used     Alcohol use 0.0 oz/week     0 Standard drinks or equivalent per week      Comment: occ     Family History   Problem Relation Age of Onset     Breast Cancer Maternal Grandmother      Psychotic Disorder Mother      severe anxiety     Hypertension Father              Reviewed and updated as needed this visit by clinical staffTobacco  Allergies  Meds  Problems  Med Hx  Surg Hx  Fam Hx  Soc Hx        Reviewed and updated as needed this visit by Provider  Allergies  Meds  Problems         ROS:  Constitutional, HEENT, cardiovascular, pulmonary, gi and gu systems are negative, except as otherwise noted.      OBJECTIVE:   /66 (BP Location: Right arm, Patient Position: Chair, Cuff Size: Adult Regular)  Pulse 74  Temp 97.1  F (36.2  C) (Oral)  Resp 16  Ht 5' 6\" (1.676 m)  Wt 192 lb (87.1 kg)  LMP 10/09/2017  SpO2 99%  Breastfeeding? No  BMI 30.99 kg/m2  Body mass index is 30.99 kg/(m^2).  GENERAL: healthy, alert and no distress  RESP: lungs clear to auscultation - no rales, rhonchi or wheezes  CV: regular rate and rhythm, normal S1 S2, no S3 or S4, no murmur, click or rub, no peripheral edema and peripheral pulses strong  PSYCH: mentation appears normal, affect normal/bright        ASSESSMENT/PLAN:             1. Seasonal affective disorder (H)   Order placed for SCD light. I have explained that she'll need to check with her insurance and the medical equipment store to determine coverage.  - order for DME; SAD light  Dispense: 1 each; Refill: 0    2. Attention deficit hyperactivity disorder (ADHD), unspecified ADHD type   Concerta increased to 36 mg once daily. Will " need to follow-up in one month to assess if dosing is appropriate.  - methylphenidate ER (CONCERTA) 36 MG CR tablet; Take 1 tablet (36 mg) by mouth every morning  Dispense: 30 tablet; Refill: 0    3. Anxiety   Patient is followed by mental health counselor for her anxiety, depression and seasonal affective disorder. Refilled Paxil today.   PARoxetine (PAXIL) 30 MG tablet; Take 1 tablet (30 mg) by mouth At Bedtime  Dispense: 30 tablet; Refill: 5    4. Recurrent major depressive disorder, in partial remission (H)  Stable.   - PARoxetine (PAXIL) 30 MG tablet; Take 1 tablet (30 mg) by mouth At Bedtime  Dispense: 30 tablet; Refill: 5    Follow-up in 1 month.    Irena Preciado NP  [facility]

## 2017-10-23 NOTE — MR AVS SNAPSHOT
After Visit Summary   10/23/2017    Gayathri Gomez    MRN: [id number]           Patient Information     Date Of Birth          1990        Visit Information        Provider Department      10/23/2017 8:30 AM Irena Preciado NP [facility]        Today's Diagnoses     Seasonal affective disorder (H)    -  1    Attention deficit hyperactivity disorder (ADHD), unspecified ADHD type        Anxiety        Recurrent major depressive disorder, in partial remission (H)        Social phobia           Follow-ups after your visit        Who to contact     If you have questions or need follow up information about today's clinic visit or your schedule please contact [facility] directly at [phone].  Normal or non-critical lab and imaging results will be communicated to you by T[employer]hart, letter or phone within 4 business days after the clinic has received the results. If you do not hear from us within 7 days, please contact the clinic through Tactilizehart or phone. If you have a critical or abnormal lab result, we will notify you by phone as soon as possible.  Submit refill requests through [employer] (Closed) or call your pharmacy and they will forward the refill request to us. Please allow 3 business days for your refill to be completed.          Additional Information About Your Visit        MyChart Information     [employer] (Closed) gives you secure access to your electronic health record. If you see a primary care provider, you can also send messages to your care team and make appointments. If you have questions, please call your primary care clinic.  If you do not have a primary care provider, please call [phone] and they will assist you.        Care EveryWhere ID     This is your Care EveryWhere ID. This could be used by other organizations to access your [place] medical records  [id number]        Your Vitals Were     Pulse Temperature Respirations Height Last Period Pulse Oximetry    74 97.1  " F (36.2  C) (Oral) 16 5' 6\" (1.676 m) 10/09/2017 99%    Breastfeeding? BMI (Body Mass Index)                No 30.99 kg/m2           Blood Pressure from Last 3 Encounters:   10/23/17 114/66   08/25/17 118/70   06/26/17 112/66    Weight from Last 3 Encounters:   10/23/17 192 lb (87.1 kg)   08/25/17 192 lb (87.1 kg)   06/26/17 192 lb (87.1 kg)              Today, you had the following     No orders found for display         Today's Medication Changes          These changes are accurate as of: 10/23/17  9:05 AM.  If you have any questions, ask your nurse or doctor.               Start taking these medicines.        Dose/Directions    order for DME   Used for:  Seasonal affective disorder (H)   Started by:  Irena Preciado NP        SAD light   Quantity:  1 each   Refills:  0         These medicines have changed or have updated prescriptions.        Dose/Directions    methylphenidate ER 36 MG CR tablet   Commonly known as:  CONCERTA   This may have changed:    - medication strength  - how much to take   Used for:  Attention deficit hyperactivity disorder (ADHD), unspecified ADHD type   Changed by:  Irena Preciado NP        Dose:  36 mg   Take 1 tablet (36 mg) by mouth every morning   Quantity:  30 tablet   Refills:  0            Where to get your medicines      These medications were sent to [facility]/pharmacy #[id number] - [place], [place] - [address]  [address] [address] [address]    Hours:  [facility] [place] Phone:  [phone]     PARoxetine 30 MG tablet         Some of these will need a paper prescription and others can be bought over the counter.  Ask your nurse if you have questions.     Bring a paper prescription for each of these medications     methylphenidate ER 36 MG CR tablet    order for DME                Primary Care Provider Office Phone # Fax #    Irena Preciado [phone]       [facility] [address] [facility] [address]        Equal Access to " Services     [facility]: Hadii aad ku hadmaycojosh Goldsmith, waaxda luqadaha, qaybta kaalmashelton pitts, kareen tiwari . So [facility] [phone].    ATENCIÓN: Si flavia canales, tiene a renteria disposición servicios gratuitos de asistencia lingüística. Llame al [phone].    We comply with applicable federal civil rights laws and [place] laws. We do not discriminate on the basis of race, color, national origin, age, disability, sex, sexual orientation, or gender identity.            Thank you!     Thank you for choosing [facility]  for your care. Our goal is always to provide you with excellent care. Hearing back from our patients is one way we can continue to improve our services. Please take a few minutes to complete the written survey that you may receive in the mail after your visit with us. Thank you!             Your Updated Medication List - Protect others around you: Learn how to safely use, store and throw away your medicines at www.disposemymeds.org.          This list is accurate as of: 10/23/17  9:05 AM.  Always use your most recent med list.                   Brand Name Dispense Instructions for use Diagnosis    levothyroxine 25 MCG tablet    SYNTHROID/LEVOTHROID    90 tablet    Take 1 tablet (25 mcg) by mouth daily    Hypothyroidism, unspecified type       methylphenidate ER 36 MG CR tablet    CONCERTA    30 tablet    Take 1 tablet (36 mg) by mouth every morning    Attention deficit hyperactivity disorder (ADHD), unspecified ADHD type       norgestim-eth estrad triphasic 0.18/0.215/0.25 MG-35 MCG per tablet    TRI-PREVIFEM    90 tablet    Take 1 tablet by mouth daily    Well woman exam with routine gynecological exam       order for DME     1 each    SAD light    Seasonal affective disorder (H)       PARoxetine 30 MG tablet    PAXIL    30 tablet    Take 1 tablet (30 mg) by mouth At Bedtime    Anxiety, Recurrent major depressive disorder, in partial remission (H),  Social phobia       propranolol 60 MG 24 hr capsule    INDERAL LA    30 capsule    Take 1 capsule (60 mg) by mouth daily    Social phobia

## 2017-10-24 ASSESSMENT — ANXIETY QUESTIONNAIRES: GAD7 TOTAL SCORE: 6

## 2017-10-29 ENCOUNTER — MYC MEDICAL ADVICE (OUTPATIENT)
Dept: FAMILY MEDICINE | Facility: CLINIC | Age: 27
End: 2017-10-29

## 2017-10-29 DIAGNOSIS — F33.41 RECURRENT MAJOR DEPRESSIVE DISORDER, IN PARTIAL REMISSION (H): ICD-10-CM

## 2017-10-29 DIAGNOSIS — F90.9 ATTENTION DEFICIT HYPERACTIVITY DISORDER (ADHD), UNSPECIFIED ADHD TYPE: ICD-10-CM

## 2017-10-29 DIAGNOSIS — F33.1 MODERATE EPISODE OF RECURRENT MAJOR DEPRESSIVE DISORDER (H): Primary | ICD-10-CM

## 2017-10-29 DIAGNOSIS — F41.9 ANXIETY: ICD-10-CM

## 2017-10-29 DIAGNOSIS — F40.10 SOCIAL PHOBIA: ICD-10-CM

## 2017-10-30 NOTE — TELEPHONE ENCOUNTER
Please advise on prozac dose.  Pt states she is on 40 mg not 30 mg but this is what was reported to us and on her profile.    Vick Patterson RN, BSN

## 2017-10-31 ENCOUNTER — TELEPHONE (OUTPATIENT)
Dept: FAMILY MEDICINE | Facility: CLINIC | Age: 27
End: 2017-10-31

## 2017-10-31 RX ORDER — FLUOXETINE 40 MG/1
40 CAPSULE ORAL DAILY
Qty: 30 CAPSULE | Refills: 1 | Status: SHIPPED | OUTPATIENT
Start: 2017-10-31 | End: 2017-10-31

## 2017-10-31 NOTE — TELEPHONE ENCOUNTER
PA submitted for methylphenidate ER (CONCERTA) 36 MG CR tablet    [employer]    Phone@ [phone]  Patient ID# [id number]    Jose PURVIS

## 2017-11-02 NOTE — TELEPHONE ENCOUNTER
Pt calling to confirm dosing-  She has been on 40 mg Paxil-  She was increased while at [place] [facility].       Advised could will send RX for 10 mg that she can take with the 30 mg for the next month to equal 40 mg.  When this RX is going to run out she will call clinic for refill and NOT request from pharmacy.      As for the ADD med she will take the 27 mg for this month and she feels she needs to take something and once PA is approved will go to the 36 mg for next fill.     Pt will contact [facility] to have previous records sent.     Katy Armando RN

## 2017-11-03 RX ORDER — PAROXETINE 40 MG/1
40 TABLET, FILM COATED ORAL AT BEDTIME
Qty: 90 TABLET | Refills: 1
Start: 2017-11-03 | End: 2017-12-13

## 2017-11-03 RX ORDER — PAROXETINE 10 MG/1
10 TABLET, FILM COATED ORAL AT BEDTIME
Qty: 30 TABLET | Refills: 0 | Status: SHIPPED | OUTPATIENT
Start: 2017-11-03 | End: 2017-12-13

## 2017-11-03 RX ORDER — METHYLPHENIDATE HYDROCHLORIDE 27 MG/1
27 TABLET ORAL EVERY MORNING
Qty: 30 TABLET | Refills: 0 | Status: SHIPPED | OUTPATIENT
Start: 2017-11-03 | End: 2017-12-13

## 2017-11-03 NOTE — TELEPHONE ENCOUNTER
Placed the Rx for Concerta 27MG up at the  for patient to , sent her [employer] message.   Corazon Patel

## 2017-12-13 ENCOUNTER — OFFICE VISIT (OUTPATIENT)
Dept: FAMILY MEDICINE | Facility: CLINIC | Age: 27
End: 2017-12-13
Payer: COMMERCIAL

## 2017-12-13 VITALS
TEMPERATURE: 97.8 F | RESPIRATION RATE: 16 BRPM | SYSTOLIC BLOOD PRESSURE: 124 MMHG | HEART RATE: 91 BPM | DIASTOLIC BLOOD PRESSURE: 60 MMHG | WEIGHT: 192 LBS | BODY MASS INDEX: 30.86 KG/M2 | OXYGEN SATURATION: 99 % | HEIGHT: 66 IN

## 2017-12-13 DIAGNOSIS — F41.9 ANXIETY: ICD-10-CM

## 2017-12-13 DIAGNOSIS — F33.41 RECURRENT MAJOR DEPRESSIVE DISORDER, IN PARTIAL REMISSION (H): ICD-10-CM

## 2017-12-13 DIAGNOSIS — F90.9 ATTENTION DEFICIT HYPERACTIVITY DISORDER (ADHD), UNSPECIFIED ADHD TYPE: ICD-10-CM

## 2017-12-13 DIAGNOSIS — F40.10 SOCIAL PHOBIA: ICD-10-CM

## 2017-12-13 PROCEDURE — 99213 OFFICE O/P EST LOW 20 MIN: CPT | Performed by: NURSE PRACTITIONER

## 2017-12-13 RX ORDER — METHYLPHENIDATE HYDROCHLORIDE 36 MG/1
36 TABLET ORAL EVERY MORNING
Qty: 30 TABLET | Refills: 0 | Status: SHIPPED | OUTPATIENT
Start: 2018-01-13 | End: 2018-04-16

## 2017-12-13 RX ORDER — METHYLPHENIDATE HYDROCHLORIDE 36 MG/1
36 TABLET ORAL EVERY MORNING
Qty: 30 TABLET | Refills: 0 | Status: SHIPPED | OUTPATIENT
Start: 2018-02-13 | End: 2018-04-16

## 2017-12-13 RX ORDER — PAROXETINE 40 MG/1
40 TABLET, FILM COATED ORAL AT BEDTIME
Qty: 90 TABLET | Refills: 1 | Status: SHIPPED | OUTPATIENT
Start: 2017-12-13 | End: 2018-09-06

## 2017-12-13 RX ORDER — METHYLPHENIDATE HYDROCHLORIDE 36 MG/1
36 TABLET ORAL EVERY MORNING
Qty: 30 TABLET | Refills: 0 | Status: SHIPPED | OUTPATIENT
Start: 2017-12-13 | End: 2018-04-16

## 2017-12-13 NOTE — MR AVS SNAPSHOT
"              After Visit Summary   12/13/2017    Gayathri Gomez    MRN: [id number]           Patient Information     Date Of Birth          1990        Visit Information        Provider Department      12/13/2017 10:00 AM Irena Preciado NP [facility]        Today's Diagnoses     Anxiety        Recurrent major depressive disorder, in partial remission (H)        Social phobia        Attention deficit hyperactivity disorder (ADHD), unspecified ADHD type           Follow-ups after your visit        Who to contact     If you have questions or need follow up information about today's clinic visit or your schedule please contact [facility] directly at [phone].  Normal or non-critical lab and imaging results will be communicated to you by Sh[employer] Ventureshart, letter or phone within 4 business days after the clinic has received the results. If you do not hear from us within 7 days, please contact the clinic through [employer]hart or phone. If you have a critical or abnormal lab result, we will notify you by phone as soon as possible.  Submit refill requests through [employer] or call your pharmacy and they will forward the refill request to us. Please allow 3 business days for your refill to be completed.          Additional Information About Your Visit        MyChart Information     [employer] gives you secure access to your electronic health record. If you see a primary care provider, you can also send messages to your care team and make appointments. If you have questions, please call your primary care clinic.  If you do not have a primary care provider, please call [phone] and they will assist you.        Care EveryWhere ID     This is your Care EveryWhere ID. This could be used by other organizations to access your [place] medical records  [id number]        Your Vitals Were     Pulse Temperature Respirations Height Last Period Pulse Oximetry    91 97.8  F (36.6  C) (Oral) 16 5' 6\" (1.676 m) " 12/05/2017 (Approximate) 99%    Breastfeeding? BMI (Body Mass Index)                No 30.99 kg/m2           Blood Pressure from Last 3 Encounters:   12/13/17 124/60   10/23/17 114/66   08/25/17 118/70    Weight from Last 3 Encounters:   12/13/17 192 lb (87.1 kg)   10/23/17 192 lb (87.1 kg)   08/25/17 192 lb (87.1 kg)              Today, you had the following     No orders found for display         Today's Medication Changes          These changes are accurate as of: 12/13/17 12:41 PM.  If you have any questions, ask your nurse or doctor.               These medicines have changed or have updated prescriptions.        Dose/Directions    * methylphenidate ER 36 MG CR tablet   Commonly known as:  CONCERTA   This may have changed:  You were already taking a medication with the same name, and this prescription was added. Make sure you understand how and when to take each.   Used for:  Attention deficit hyperactivity disorder (ADHD), unspecified ADHD type   Changed by:  Irena Preciado NP        Dose:  36 mg   Take 1 tablet (36 mg) by mouth every morning   Quantity:  30 tablet   Refills:  0       * methylphenidate ER 36 MG CR tablet   Commonly known as:  CONCERTA   This may have changed:  You were already taking a medication with the same name, and this prescription was added. Make sure you understand how and when to take each.   Used for:  Attention deficit hyperactivity disorder (ADHD), unspecified ADHD type   Changed by:  Irena Preciado NP        Dose:  36 mg   Start taking on:  1/13/2018   Take 1 tablet (36 mg) by mouth every morning   Quantity:  30 tablet   Refills:  0       * methylphenidate ER 36 MG CR tablet   Commonly known as:  CONCERTA   This may have changed:  Another medication with the same name was added. Make sure you understand how and when to take each.   Used for:  Attention deficit hyperactivity disorder (ADHD), unspecified ADHD type   Changed by:  Irena Preciado NP        Dose:  36 mg   Start  taking on:  2/13/2018   Take 1 tablet (36 mg) by mouth every morning   Quantity:  30 tablet   Refills:  0       * Notice:  This list has 3 medication(s) that are the same as other medications prescribed for you. Read the directions carefully, and ask your doctor or other care provider to review them with you.         Where to get your medicines      These medications were sent to [facility]/pharmacy #[id number] - [place], [place] - [address] [address]  [address] [address] [address]    Hours:  [facility] Phone:  [phone]     PARoxetine 40 MG tablet         Some of these will need a paper prescription and others can be bought over the counter.  Ask your nurse if you have questions.     Bring a paper prescription for each of these medications     methylphenidate ER 36 MG CR tablet    methylphenidate ER 36 MG CR tablet    methylphenidate ER 36 MG CR tablet                Primary Care Provider Office Phone # Fax #    Irena PATRICIA Preciado [phone]       [facility] [address]  [facility] [address]        Equal Access to Services     LITA ALTAMIRANO [facility]: Hadii aron ku hadasho Soomaali, waaxda luqadaha, qaybta kaalmada adeegyada, waxay anastasiya tiwari . So [facility] [phone].    ATENCIÓN: Si habla español, tiene a renteria disposición servicios gratuitos de asistencia lingüística. Llame al [phone].    We comply with applicable federal civil rights laws and [place] laws. We do not discriminate on the basis of race, color, national origin, age, disability, sex, sexual orientation, or gender identity.            Thank you!     Thank you for choosing [facility]  for your care. Our goal is always to provide you with excellent care. Hearing back from our patients is one way we can continue to improve our services. Please take a few minutes to complete the written survey that you may receive in the mail after your visit with us. Thank you!             Your  Updated Medication List - Protect others around you: Learn how to safely use, store and throw away your medicines at www.disposemymeds.org.          This list is accurate as of: 12/13/17 12:41 PM.  Always use your most recent med list.                   Brand Name Dispense Instructions for use Diagnosis    levothyroxine 25 MCG tablet    SYNTHROID/LEVOTHROID    90 tablet    Take 1 tablet (25 mcg) by mouth daily    Hypothyroidism, unspecified type       * methylphenidate ER 36 MG CR tablet    CONCERTA    30 tablet    Take 1 tablet (36 mg) by mouth every morning    Attention deficit hyperactivity disorder (ADHD), unspecified ADHD type       * methylphenidate ER 36 MG CR tablet   Start taking on:  1/13/2018    CONCERTA    30 tablet    Take 1 tablet (36 mg) by mouth every morning    Attention deficit hyperactivity disorder (ADHD), unspecified ADHD type       * methylphenidate ER 36 MG CR tablet   Start taking on:  2/13/2018    CONCERTA    30 tablet    Take 1 tablet (36 mg) by mouth every morning    Attention deficit hyperactivity disorder (ADHD), unspecified ADHD type       norgestim-eth estrad triphasic 0.18/0.215/0.25 MG-35 MCG per tablet    TRI-PREVIFEM    90 tablet    Take 1 tablet by mouth daily    Well woman exam with routine gynecological exam       order for DME     1 each    SAD light    Seasonal affective disorder (H)       PARoxetine 40 MG tablet    PAXIL    90 tablet    Take 1 tablet (40 mg) by mouth At Bedtime    Anxiety, Recurrent major depressive disorder, in partial remission (H), Social phobia       propranolol 60 MG 24 hr capsule    INDERAL LA    30 capsule    Take 1 capsule (60 mg) by mouth daily    Social phobia       * Notice:  This list has 3 medication(s) that are the same as other medications prescribed for you. Read the directions carefully, and ask your doctor or other care provider to review them with you.

## 2017-12-13 NOTE — NURSING NOTE
"Chief Complaint   Patient presents with     Recheck Medication       Initial /60 (BP Location: Right arm, Patient Position: Chair, Cuff Size: Adult Large)  Pulse 91  Temp 97.8  F (36.6  C) (Oral)  Resp 16  Ht 5' 6\" (1.676 m)  Wt 192 lb (87.1 kg)  LMP 12/05/2017 (Approximate)  SpO2 99%  Breastfeeding? No  BMI 30.99 kg/m2 Estimated body mass index is 30.99 kg/(m^2) as calculated from the following:    Height as of this encounter: 5' 6\" (1.676 m).    Weight as of this encounter: 192 lb (87.1 kg).  Medication Reconciliation: complete     Robin Cm CMA      "

## 2017-12-13 NOTE — PROGRESS NOTES
ADHD    Onset: at age 17     Description:   Easily distracted: no  Short attention span: YES- at times    Trouble following directions: no   Impulsive behavior: no   Trouble completing tasks: YES      Accompanying Signs & Symptoms:        Change in sleep pattern: no  Irritability at certain times of the day: no  Socially withdrawn: no  Depression symptoms: YES    Anxiety symptoms: YES      History:  Caffeine intake: Small  Loss of appetite: no  Healthy diet: YES    Did you have problems in school or with previous employment: no  Family history of ADHD: YES, brother and cousin    Have you had an evaluation for ADHD in the past: YES    Do you use alcohol or drugs: no  Therapies tried: Adderall (concerta), paxil with total relief  SUBJECTIVE:   Gayathri Gomez is a 27 year old female who presents to clinic today for the following health issues:      Medication Followup and discuss    Taking Medication as prescribed: yes    Side Effects:  None    Medication Helping Symptoms:  yes     Here for medication refills.  Patient has been taking Paxil 40 mg daily  and using an SAD light for at least half an hour daily.  Mood is improved with use of light.     Requesting refill of Concerta 36 mg once daily. Dosing was increased last month  And patient has noticed significant improvement in ability to concentrate and focus.  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  Additional history: none    Patient Active Problem List   Diagnosis     Anxiety     Social phobia     ADHD (attention deficit hyperactivity disorder)     Major depression in partial remission (H)     CARDIOVASCULAR SCREENING; LDL GOAL LESS THAN 160     Hypothyroidism     Seasonal affective disorder (H)     History reviewed. No pertinent surgical history.    Social History   Substance Use Topics     Smoking status: Current Some Day Smoker     Years: 5.00     Types: Cigarettes      "Smokeless tobacco: Never Used     Alcohol use 0.0 oz/week     0 Standard drinks or equivalent per week      Comment: occ     Family History   Problem Relation Age of Onset     Breast Cancer Maternal Grandmother      Psychotic Disorder Mother      severe anxiety     Hypertension Father              Reviewed and updated as needed this visit by clinical staffTobacco  Allergies  Meds  Problems  Med Hx  Surg Hx  Fam Hx  Soc Hx        Reviewed and updated as needed this visit by Provider  Allergies  Meds  Problems  Med Hx  Surg Hx  Fam Hx         ROS:  Constitutional, HEENT, cardiovascular, pulmonary, gi and gu systems are negative, except as otherwise noted.      OBJECTIVE:   /60 (BP Location: Right arm, Patient Position: Chair, Cuff Size: Adult Large)  Pulse 91  Temp 97.8  F (36.6  C) (Oral)  Resp 16  Ht 5' 6\" (1.676 m)  Wt 192 lb (87.1 kg)  LMP 12/05/2017 (Approximate)  SpO2 99%  Breastfeeding? No  BMI 30.99 kg/m2  Body mass index is 30.99 kg/(m^2).  GENERAL: healthy, alert and no distress  EYES: Eyes grossly normal to inspection, PERRL and conjunctivae and sclerae normal  RESP: lungs clear to auscultation - no rales, rhonchi or wheezes  CV: regular rate and rhythm, normal S1 S2, no S3 or S4, no murmur, click or rub, no peripheral edema and peripheral pulses strong  PSYCH: mentation appears normal, affect normal/bright        ASSESSMENT/PLAN:             1. Anxiety   stable. Continue with Paxil.  - PARoxetine (PAXIL) 40 MG tablet; Take 1 tablet (40 mg) by mouth At Bedtime  Dispense: 90 tablet; Refill: 1    2. Recurrent major depressive disorder, in partial remission (H)  Stable  - PARoxetine (PAXIL) 40 MG tablet; Take 1 tablet (40 mg) by mouth At Bedtime  Dispense: 90 tablet; Refill: 1    3. Social phobia  Stable  - PARoxetine (PAXIL) 40 MG tablet; Take 1 tablet (40 mg) by mouth At Bedtime  Dispense: 90 tablet; Refill: 1    4. Attention deficit hyperactivity disorder (ADHD), unspecified ADHD " type    Refilled for 3 months. May call for next refill and needs o be seen every 6 months.  - methylphenidate ER (CONCERTA) 36 MG CR tablet; Take 1 tablet (36 mg) by mouth every morning  Dispense: 30 tablet; Refill: 0  - methylphenidate ER (CONCERTA) 36 MG CR tablet; Take 1 tablet (36 mg) by mouth every morning  Dispense: 30 tablet; Refill: 0  - methylphenidate ER (CONCERTA) 36 MG CR tablet; Take 1 tablet (36 mg) by mouth every morning  Dispense: 30 tablet; Refill: 0     follow-up in 6 months and sooner if needed.    Irena Preciado, NP  [facility]

## 2018-02-26 DIAGNOSIS — Z01.419 WELL WOMAN EXAM WITH ROUTINE GYNECOLOGICAL EXAM: ICD-10-CM

## 2018-02-26 NOTE — TELEPHONE ENCOUNTER
"Requested Prescriptions   Pending Prescriptions Disp Refills     TRI-ESTARYLLA 0.18/0.215/0.25 MG-35 MCG per tablet [Pharmacy Med Name: TRI-ESTARYLLA TABLET] 84 tablet 3    Last Written Prescription Date:  03/20/2017  Last Fill Quantity: 90 tablet,  # refills: 3   Last office visit: 12/13/2017 with prescribing provider:  12/13/2017   Future Office Visit:     Sig: TAKE 1 TABLET BY MOUTH ONCE DAILY    Contraceptives Protocol Failed    2/26/2018  1:16 AM       Failed - No positive pregnancy test in past 12 months       Passed - Patient is not a current smoker if age is 35 or older       Passed - Recent or future visit with authorizing provider's specialty    Patient had office visit in the last year or has a visit in the next 30 days with authorizing provider.  See \"Patient Info\" tab in inbasket, or \"Choose Columns\" in Meds & Orders section of the refill encounter.            Passed - No active pregnancy on record          Jose PURVIS  "

## 2018-02-27 RX ORDER — NORGESTIMATE AND ETHINYL ESTRADIOL 7DAYSX3 28
KIT ORAL
Qty: 84 TABLET | Refills: 1 | Status: SHIPPED | OUTPATIENT
Start: 2018-02-27 | End: 2018-08-13

## 2018-04-16 ENCOUNTER — OFFICE VISIT (OUTPATIENT)
Dept: FAMILY MEDICINE | Facility: CLINIC | Age: 28
End: 2018-04-16
Payer: COMMERCIAL

## 2018-04-16 VITALS
DIASTOLIC BLOOD PRESSURE: 80 MMHG | HEART RATE: 84 BPM | OXYGEN SATURATION: 97 % | WEIGHT: 192 LBS | HEIGHT: 66 IN | TEMPERATURE: 98.4 F | SYSTOLIC BLOOD PRESSURE: 118 MMHG | RESPIRATION RATE: 16 BRPM | BODY MASS INDEX: 30.86 KG/M2

## 2018-04-16 DIAGNOSIS — F41.9 ANXIETY: Primary | ICD-10-CM

## 2018-04-16 DIAGNOSIS — F90.9 ATTENTION DEFICIT HYPERACTIVITY DISORDER (ADHD), UNSPECIFIED ADHD TYPE: ICD-10-CM

## 2018-04-16 PROCEDURE — 99214 OFFICE O/P EST MOD 30 MIN: CPT | Performed by: NURSE PRACTITIONER

## 2018-04-16 RX ORDER — METHYLPHENIDATE HYDROCHLORIDE 36 MG/1
36 TABLET ORAL EVERY MORNING
Qty: 30 TABLET | Refills: 0 | Status: ON HOLD | OUTPATIENT
Start: 2018-05-16 | End: 2019-11-18

## 2018-04-16 RX ORDER — METHYLPHENIDATE HYDROCHLORIDE 36 MG/1
36 TABLET ORAL EVERY MORNING
Qty: 30 TABLET | Refills: 0 | Status: SHIPPED | OUTPATIENT
Start: 2018-06-16 | End: 2020-01-10

## 2018-04-16 RX ORDER — METHYLPHENIDATE HYDROCHLORIDE 36 MG/1
36 TABLET ORAL EVERY MORNING
Qty: 30 TABLET | Refills: 0 | Status: ON HOLD | OUTPATIENT
Start: 2018-04-16 | End: 2019-11-18

## 2018-04-16 RX ORDER — BUSPIRONE HYDROCHLORIDE 5 MG/1
5 TABLET ORAL 2 TIMES DAILY
Qty: 60 TABLET | Refills: 0 | Status: ON HOLD | OUTPATIENT
Start: 2018-04-16 | End: 2019-11-18

## 2018-04-16 ASSESSMENT — PATIENT HEALTH QUESTIONNAIRE - PHQ9: 5. POOR APPETITE OR OVEREATING: SEVERAL DAYS

## 2018-04-16 ASSESSMENT — ANXIETY QUESTIONNAIRES
2. NOT BEING ABLE TO STOP OR CONTROL WORRYING: SEVERAL DAYS
GAD7 TOTAL SCORE: 7
5. BEING SO RESTLESS THAT IT IS HARD TO SIT STILL: NOT AT ALL
1. FEELING NERVOUS, ANXIOUS, OR ON EDGE: SEVERAL DAYS
7. FEELING AFRAID AS IF SOMETHING AWFUL MIGHT HAPPEN: MORE THAN HALF THE DAYS
3. WORRYING TOO MUCH ABOUT DIFFERENT THINGS: SEVERAL DAYS
6. BECOMING EASILY ANNOYED OR IRRITABLE: SEVERAL DAYS

## 2018-04-16 NOTE — NURSING NOTE
"Chief Complaint   Patient presents with     Recheck Medication       Initial /80 (BP Location: Right arm, Patient Position: Chair, Cuff Size: Adult Large)  Pulse 84  Temp 98.4  F (36.9  C) (Oral)  Resp 16  Ht 5' 6\" (1.676 m)  Wt 192 lb (87.1 kg)  LMP 04/09/2018  SpO2 97%  Breastfeeding? No  BMI 30.99 kg/m2 Estimated body mass index is 30.99 kg/(m^2) as calculated from the following:    Height as of this encounter: 5' 6\" (1.676 m).    Weight as of this encounter: 192 lb (87.1 kg).  Medication Reconciliation: complete     Robin Cm CMA      "

## 2018-04-16 NOTE — PROGRESS NOTES
SUBJECTIVE:   Gayathri Gomez is a 27 year old female who presents to clinic today for the following health issues:       Here to follow-up on both depression and anxiety. Is seen by her mental health counselor once weekly. Currently is on Prozac 40 mg once daily feels it's effective in managing her depression. Is having worsening anxiety including daily anxiety along with situational anxiety. Is finding herself often upset with her boyfriend who doesn't seem to understand her mood changes. Is not interested in changing antidepressants since Prozac is the most effective. Trying to stay active as that often helps with her mood. Family history of mental health issues.       Problem list and histories reviewed & adjusted, as indicated.  Additional history: none    Patient Active Problem List   Diagnosis     Anxiety     Social phobia     ADHD (attention deficit hyperactivity disorder)     Major depression in partial remission (H)     CARDIOVASCULAR SCREENING; LDL GOAL LESS THAN 160     Hypothyroidism     Seasonal affective disorder (H)     History reviewed. No pertinent surgical history.    Social History   Substance Use Topics     Smoking status: Current Some Day Smoker     Years: 5.00     Types: Cigarettes     Smokeless tobacco: Never Used     Alcohol use 0.0 oz/week     0 Standard drinks or equivalent per week      Comment: occ     Family History   Problem Relation Age of Onset     Breast Cancer Maternal Grandmother      Psychotic Disorder Mother      severe anxiety     Hypertension Father            Reviewed and updated as needed this visit by clinical staff  Tobacco  Allergies  Meds  Problems  Med Hx  Surg Hx  Fam Hx  Soc Hx        Reviewed and updated as needed this visit by Provider  Allergies  Meds  Problems  Med Hx  Surg Hx  Fam Hx         ROS:  Constitutional, HEENT, cardiovascular, pulmonary, gi and gu systems are negative, except as otherwise noted.    OBJECTIVE:     /80 (BP Location: Right  "arm, Patient Position: Chair, Cuff Size: Adult Large)  Pulse 84  Temp 98.4  F (36.9  C) (Oral)  Resp 16  Ht 5' 6\" (1.676 m)  Wt 192 lb (87.1 kg)  LMP 04/09/2018  SpO2 97%  Breastfeeding? No  BMI 30.99 kg/m2  Body mass index is 30.99 kg/(m^2).  GENERAL: healthy, alert and no distress  RESP: lungs clear to auscultation - no rales, rhonchi or wheezes  CV: regular rate and rhythm, normal S1 S2, no S3 or S4, no murmur, click or rub, no peripheral edema and peripheral pulses strong  PSYCH: mentation appears normal, affect normal/bright        ASSESSMENT/PLAN:             1. Anxiety   Continue with Prozac 40 mg once daily. Will add BuSpar 5 mg twice daily. Encourage patient to continue to follow with her mental specialist. Follow-up in one month to check on status of medication.  - busPIRone (BUSPAR) 5 MG tablet; Take 1 tablet (5 mg) by mouth 2 times daily  Dispense: 60 tablet; Refill: 0    2. Attention deficit hyperactivity disorder (ADHD), unspecified ADHD type   Stable. Refilled for 3 months.  - methylphenidate ER (CONCERTA) 36 MG CR tablet; Take 1 tablet (36 mg) by mouth every morning  Dispense: 30 tablet; Refill: 0  - methylphenidate ER (CONCERTA) 36 MG CR tablet; Take 1 tablet (36 mg) by mouth every morning  Dispense: 30 tablet; Refill: 0  - methylphenidate ER (CONCERTA) 36 MG CR tablet; Take 1 tablet (36 mg) by mouth every morning  Dispense: 30 tablet; Refill: 0        Irena Preciado NP  [facility]    "

## 2018-04-16 NOTE — MR AVS SNAPSHOT
"              After Visit Summary   4/16/2018    Gayathri Gomez    MRN: [id number]           Patient Information     Date Of Birth          1990        Visit Information        Provider Department      4/16/2018 1:00 PM Irena Preciado NP [facility]        Today's Diagnoses     Anxiety    -  1    Attention deficit hyperactivity disorder (ADHD), unspecified ADHD type           Follow-ups after your visit        Who to contact     If you have questions or need follow up information about today's clinic visit or your schedule please contact [facility] directly at [phone].  Normal or non-critical lab and imaging results will be communicated to you by Wotehart, letter or phone within 4 business days after the clinic has received the results. If you do not hear from us within 7 days, please contact the clinic through Wotehart or phone. If you have a critical or abnormal lab result, we will notify you by phone as soon as possible.  Submit refill requests through Navut or call your pharmacy and they will forward the refill request to us. Please allow 3 business days for your refill to be completed.          Additional Information About Your Visit        MyChart Information     Navut gives you secure access to your electronic health record. If you see a primary care provider, you can also send messages to your care team and make appointments. If you have questions, please call your primary care clinic.  If you do not have a primary care provider, please call [phone] and they will assist you.        Care EveryWhere ID     This is your Care EveryWhere ID. This could be used by other organizations to access your [place] medical records  [id number]        Your Vitals Were     Pulse Temperature Respirations Height Last Period Pulse Oximetry    84 98.4  F (36.9  C) (Oral) 16 5' 6\" (1.676 m) 04/09/2018 97%    Breastfeeding? BMI (Body Mass Index)                No 30.99 kg/m2        "    Blood Pressure from Last 3 Encounters:   04/16/18 118/80   12/13/17 124/60   10/23/17 114/66    Weight from Last 3 Encounters:   04/16/18 192 lb (87.1 kg)   12/13/17 192 lb (87.1 kg)   10/23/17 192 lb (87.1 kg)              Today, you had the following     No orders found for display         Today's Medication Changes          These changes are accurate as of 4/16/18  4:18 PM.  If you have any questions, ask your nurse or doctor.               Start taking these medicines.        Dose/Directions    busPIRone 5 MG tablet   Commonly known as:  BUSPAR   Used for:  Anxiety   Started by:  Irena Preciado NP        Dose:  5 mg   Take 1 tablet (5 mg) by mouth 2 times daily   Quantity:  60 tablet   Refills:  0            Where to get your medicines      These medications were sent to [facility]/pharmacy #[id number] - [place], [place] - [address] [address]  [address] [address] [address]    Hours:  [facility] to [facility] Phone:  [phone]     busPIRone 5 MG tablet         Some of these will need a paper prescription and others can be bought over the counter.  Ask your nurse if you have questions.     Bring a paper prescription for each of these medications     methylphenidate ER 36 MG CR tablet    methylphenidate ER 36 MG CR tablet    methylphenidate ER 36 MG CR tablet                Primary Care Provider Office Phone # Fax #    Irena Preciado [phone]       [facility] [address] [facility] [address]        Equal Access to Services     LITA ALTAMIRANO AH: Hadii aad ku hadasho Soomaali, waaxda luqadaha, qaybta kaalmada adeegyada, waxay idiin haysamian bhakti painter. So [facility] [phone].    ATENCIÓN: Si habla español, tiene a renteria disposición servicios gratuitos de asistencia lingüística. Llame al [phone].    We comply with applicable federal civil rights laws and [place] laws. We do not discriminate on the basis of race, color, national origin, age, disability, sex, sexual  orientation, or gender identity.            Thank you!     Thank you for choosing [facility]  for your care. Our goal is always to provide you with excellent care. Hearing back from our patients is one way we can continue to improve our services. Please take a few minutes to complete the written survey that you may receive in the mail after your visit with us. Thank you!             Your Updated Medication List - Protect others around you: Learn how to safely use, store and throw away your medicines at www.disposemymeds.org.          This list is accurate as of 4/16/18  4:18 PM.  Always use your most recent med list.                   Brand Name Dispense Instructions for use Diagnosis    busPIRone 5 MG tablet    BUSPAR    60 tablet    Take 1 tablet (5 mg) by mouth 2 times daily    Anxiety       levothyroxine 25 MCG tablet    SYNTHROID/LEVOTHROID    90 tablet    Take 1 tablet (25 mcg) by mouth daily    Hypothyroidism, unspecified type       * methylphenidate ER 36 MG CR tablet    CONCERTA    30 tablet    Take 1 tablet (36 mg) by mouth every morning    Attention deficit hyperactivity disorder (ADHD), unspecified ADHD type       * methylphenidate ER 36 MG CR tablet   Start taking on:  5/16/2018    CONCERTA    30 tablet    Take 1 tablet (36 mg) by mouth every morning    Attention deficit hyperactivity disorder (ADHD), unspecified ADHD type       * methylphenidate ER 36 MG CR tablet   Start taking on:  6/16/2018    CONCERTA    30 tablet    Take 1 tablet (36 mg) by mouth every morning    Attention deficit hyperactivity disorder (ADHD), unspecified ADHD type       order for DME     1 each    SAD light    Seasonal affective disorder (H)       PARoxetine 40 MG tablet    PAXIL    90 tablet    Take 1 tablet (40 mg) by mouth At Bedtime    Anxiety, Recurrent major depressive disorder, in partial remission (H), Social phobia       propranolol 60 MG 24 hr capsule    INDERAL LA    30 capsule    Take 1 capsule (60  mg) by mouth daily    Social phobia       TRI-ESTARYLLA 0.18/0.215/0.25 MG-35 MCG per tablet   Generic drug:  norgestim-eth estrad triphasic     84 tablet    TAKE 1 TABLET BY MOUTH ONCE DAILY    Well woman exam with routine gynecological exam       * Notice:  This list has 3 medication(s) that are the same as other medications prescribed for you. Read the directions carefully, and ask your doctor or other care provider to review them with you.

## 2018-04-17 ASSESSMENT — PATIENT HEALTH QUESTIONNAIRE - PHQ9: SUM OF ALL RESPONSES TO PHQ QUESTIONS 1-9: 11

## 2018-04-17 ASSESSMENT — ANXIETY QUESTIONNAIRES: GAD7 TOTAL SCORE: 7

## 2018-08-13 ENCOUNTER — OFFICE VISIT (OUTPATIENT)
Dept: FAMILY MEDICINE | Facility: CLINIC | Age: 28
End: 2018-08-13
Payer: COMMERCIAL

## 2018-08-13 VITALS
TEMPERATURE: 98.4 F | SYSTOLIC BLOOD PRESSURE: 128 MMHG | DIASTOLIC BLOOD PRESSURE: 83 MMHG | HEART RATE: 72 BPM | HEIGHT: 66 IN | OXYGEN SATURATION: 98 %

## 2018-08-13 DIAGNOSIS — F90.9 ATTENTION DEFICIT HYPERACTIVITY DISORDER (ADHD), UNSPECIFIED ADHD TYPE: Primary | ICD-10-CM

## 2018-08-13 DIAGNOSIS — Z30.41 ENCOUNTER FOR SURVEILLANCE OF CONTRACEPTIVE PILLS: ICD-10-CM

## 2018-08-13 PROCEDURE — 99214 OFFICE O/P EST MOD 30 MIN: CPT | Performed by: PHYSICIAN ASSISTANT

## 2018-08-13 RX ORDER — METHYLPHENIDATE HYDROCHLORIDE 36 MG/1
36 TABLET ORAL EVERY MORNING
Qty: 30 TABLET | Refills: 0 | Status: SHIPPED | OUTPATIENT
Start: 2018-08-13 | End: 2018-08-13

## 2018-08-13 RX ORDER — NORGESTIMATE AND ETHINYL ESTRADIOL 7DAYSX3 28
1 KIT ORAL DAILY
Qty: 84 TABLET | Refills: 1 | Status: SHIPPED | OUTPATIENT
Start: 2018-08-13 | End: 2019-03-08

## 2018-08-13 RX ORDER — METHYLPHENIDATE HYDROCHLORIDE 36 MG/1
36 TABLET ORAL EVERY MORNING
Qty: 30 TABLET | Refills: 0 | Status: SHIPPED | OUTPATIENT
Start: 2018-08-13 | End: 2018-12-06

## 2018-08-13 NOTE — PROGRESS NOTES
SUBJECTIVE:   Gayathri Gomez is a 27 year old female who presents to clinic today with  because of:    Chief Complaint   Patient presents with     Recheck Medication      States does well on concerta.  Prior to this was on stratterra and this caused a suicidal reaction.      Also, she has additional complaints of needs refill on ocp .not alysa for pap until next year    HPI  ADHD Follow-Up    Date of last ADHD office visit: 3 months   Status since last visit: Stable  Taking controlled (daily) medications as prescribed: Yes                       Parent/Patient Concerns with Medications:   ADHD Medication     Stimulants - Misc. Disp Start End    methylphenidate ER (CONCERTA) 36 MG CR tablet 30 tablet 8/13/2018     Sig - Route: Take 1 tablet (36 mg) by mouth every morning Please fill on or after 10/12/18 - Oral    Class: Local Print    methylphenidate ER (CONCERTA) 36 MG CR tablet 30 tablet 6/16/2018     Sig - Route: Take 1 tablet (36 mg) by mouth every morning - Oral    Class: Local Print    methylphenidate ER (CONCERTA) 36 MG CR tablet 30 tablet 5/16/2018     Sig - Route: Take 1 tablet (36 mg) by mouth every morning - Oral    Class: Local Print    methylphenidate ER (CONCERTA) 36 MG CR tablet 30 tablet 4/16/2018     Sig - Route: Take 1 tablet (36 mg) by mouth every morning - Oral    Class: Local Print        ROS  Constitutional, eye, ENT, skin, respiratory, cardiac, and GI are normal except as otherwise noted.    PROBLEM LIST  Patient Active Problem List    Diagnosis Date Noted     Seasonal affective disorder (H) 10/23/2017     Priority: Medium     Hypothyroidism 04/11/2017     Priority: Medium     CARDIOVASCULAR SCREENING; LDL GOAL LESS THAN 160 04/09/2013     Priority: Medium     Anxiety 12/05/2012     Priority: Medium     Social phobia 12/05/2012     Priority: Medium     ADHD (attention deficit hyperactivity disorder) 12/05/2012     Priority: Medium     Major depression in partial remission (H) 12/05/2012      "Priority: Medium      MEDICATIONS  Current Outpatient Prescriptions   Medication Sig Dispense Refill     levothyroxine (SYNTHROID/LEVOTHROID) 25 MCG tablet TAKE 1 TABLET BY MOUTH ONCE DAILY 30 tablet 9     methylphenidate ER (CONCERTA) 36 MG CR tablet Take 1 tablet (36 mg) by mouth every morning Please fill on or after 10/12/18 30 tablet 0     methylphenidate ER (CONCERTA) 36 MG CR tablet Take 1 tablet (36 mg) by mouth every morning 30 tablet 0     methylphenidate ER (CONCERTA) 36 MG CR tablet Take 1 tablet (36 mg) by mouth every morning 30 tablet 0     methylphenidate ER (CONCERTA) 36 MG CR tablet Take 1 tablet (36 mg) by mouth every morning 30 tablet 0     norgestim-eth estrad triphasic (TRI-ESTARYLLA) 0.18/0.215/0.25 MG-35 MCG per tablet Take 1 tablet by mouth daily 84 tablet 1     order for DME SAD light 1 each 0     PARoxetine (PAXIL) 40 MG tablet Take 1 tablet (40 mg) by mouth At Bedtime 90 tablet 1     propranolol (INDERAL LA) 60 MG 24 hr capsule TAKE 1 CAPSULE (60 MG) BY MOUTH ONCE DAILY 30 capsule 5     busPIRone (BUSPAR) 5 MG tablet Take 1 tablet (5 mg) by mouth 2 times daily (Patient not taking: Reported on 8/13/2018) 60 tablet 0     [DISCONTINUED] TRI-ESTARYLLA 0.18/0.215/0.25 MG-35 MCG per tablet TAKE 1 TABLET BY MOUTH ONCE DAILY 84 tablet 1      ALLERGIES  Allergies   Allergen Reactions     Sulfa Drugs Hives       Reviewed and updated as needed this visit by clinical staff  Tobacco  Allergies  Meds  Med Hx  Surg Hx  Fam Hx  Soc Hx        Reviewed and updated as needed this visit by Provider       OBJECTIVE:     /83 (BP Location: Right arm, Patient Position: Chair, Cuff Size: Adult Large)  Pulse 72  Temp 98.4  F (36.9  C) (Oral)  Ht 5' 6\" (1.676 m)  SpO2 98%  Normalized stature-for-age data not available for patients older than 20 years.  Normalized weight-for-age data not available for patients older than 20 years.  Normalized BMI data available only for age 0 to 20 years.  Normalized " stature-for-age data not available for patients older than 20 years.    GENERAL:  Alert and interactive., EYES:  Normal extra-ocular movements.  PERRLA, LUNGS:  Clear, HEART:  Normal rate and rhythm.  Normal S1 and S2.  No murmurs., ABDOMEN:  Soft, non-tender, no organomegaly. and NEURO:  No tics or tremor.  Normal tone and strength. Normal gait and balance.     DIAGNOSTICS: None    ASSESSMENT/PLAN:   1. Attention deficit hyperactivity disorder (ADHD), unspecified ADHD type    - methylphenidate ER (CONCERTA) 36 MG CR tablet; Take 1 tablet (36 mg) by mouth every morning Please fill on or after 10/12/18  Dispense: 30 tablet; Refill: 0    2. Encounter for surveillance of contraceptive pills    - norgestim-eth estrad triphasic (TRI-ESTARYLLA) 0.18/0.215/0.25 MG-35 MCG per tablet; Take 1 tablet by mouth daily  Dispense: 84 tablet; Refill: 1    FOLLOW UP: 6 months     Ramona Ann Aaseby-Aguilera, PA-C   Patient Instructions       (Z01.419) Well woman exam with routine gynecological exam  Comment:   Plan: norgestim-eth estrad triphasic (TRI-ESTARYLLA)         0.18/0.215/0.25 MG-35 MCG per tablet            (F90.9) Attention deficit hyperactivity disorder (ADHD), unspecified ADHD type  Comment: stable and doing well, follow-up in 6 months.  Make it a physical   Plan: methylphenidate ER (CONCERTA) 36 MG CR tablet,         DISCONTINUED: methylphenidate ER (CONCERTA) 36         MG CR tablet, DISCONTINUED: methylphenidate ER         (CONCERTA) 36 MG CR tablet

## 2018-08-13 NOTE — MR AVS SNAPSHOT
After Visit Summary   8/13/2018    Gayathri Gomez    MRN: [id number]           Patient Information     Date Of Birth          1990        Visit Information        Provider Department      8/13/2018 1:30 PM Aaseby-Aguilera, Ramona Ann, PA-C [facility]        Today's Diagnoses     Attention deficit hyperactivity disorder (ADHD), unspecified ADHD type    -  1    Well woman exam with routine gynecological exam          Care Instructions        (Z01.419) Well woman exam with routine gynecological exam  Comment:   Plan: norgestim-eth estrad triphasic (TRI-ESTARYLLA)         0.18/0.215/0.25 MG-35 MCG per tablet            (F90.9) Attention deficit hyperactivity disorder (ADHD), unspecified ADHD type  Comment: stable and doing well, follow-up in 6 months.  Make it a physical   Plan: methylphenidate ER (CONCERTA) 36 MG CR tablet,         DISCONTINUED: methylphenidate ER (CONCERTA) 36         MG CR tablet, DISCONTINUED: methylphenidate ER         (CONCERTA) 36 MG CR tablet                      Follow-ups after your visit        Follow-up notes from your care team     Return in about 6 months (around 2/13/2019) for Physical Exam.      Who to contact     If you have questions or need follow up information about today's clinic visit or your schedule please contact [facility] directly at [phone].  Normal or non-critical lab and imaging results will be communicated to you by MyChart, letter or phone within 4 business days after the clinic has received the results. If you do not hear from us within 7 days, please contact the clinic through MyChart or phone. If you have a critical or abnormal lab result, we will notify you by phone as soon as possible.  Submit refill requests through [employer] or call your pharmacy and they will forward the refill request to us. Please allow 3 business days for your refill to be completed.          Additional Information About Your Visit       "  [employer] Information     [employer] gives you secure access to your electronic health record. If you see a primary care provider, you can also send messages to your care team and make appointments. If you have questions, please call your primary care clinic.  If you do not have a primary care provider, please call [phone] and they will assist you.        Care EveryWhere ID     This is your Care EveryWhere ID. This could be used by other organizations to access your [facility] medical records  [id number]        Your Vitals Were     Pulse Temperature Height Pulse Oximetry          72 98.4  F (36.9  C) (Oral) 5' 6\" (1.676 m) 98%         Blood Pressure from Last 3 Encounters:   08/13/18 128/83   04/16/18 118/80   12/13/17 124/60    Weight from Last 3 Encounters:   04/16/18 192 lb (87.1 kg)   12/13/17 192 lb (87.1 kg)   10/23/17 192 lb (87.1 kg)              Today, you had the following     No orders found for display         Today's Medication Changes          These changes are accurate as of 8/13/18  1:58 PM.  If you have any questions, ask your nurse or doctor.               These medicines have changed or have updated prescriptions.        Dose/Directions    * methylphenidate ER 36 MG CR tablet   Commonly known as:  CONCERTA   This may have changed:  Another medication with the same name was added. Make sure you understand how and when to take each.   Used for:  Attention deficit hyperactivity disorder (ADHD), unspecified ADHD type   Changed by:  Aaseby-Aguilera, Ramona Ann, PA-C        Dose:  36 mg   Take 1 tablet (36 mg) by mouth every morning   Quantity:  30 tablet   Refills:  0       * methylphenidate ER 36 MG CR tablet   Commonly known as:  CONCERTA   This may have changed:  Another medication with the same name was added. Make sure you understand how and when to take each.   Used for:  Attention deficit hyperactivity disorder (ADHD), unspecified ADHD type   Changed by:  Aaseby-Aguilera, Ramona Ann, PA-C     "    Dose:  36 mg   Take 1 tablet (36 mg) by mouth every morning   Quantity:  30 tablet   Refills:  0       * methylphenidate ER 36 MG CR tablet   Commonly known as:  CONCERTA   This may have changed:  Another medication with the same name was added. Make sure you understand how and when to take each.   Used for:  Attention deficit hyperactivity disorder (ADHD), unspecified ADHD type   Changed by:  Aaseby-Aguilera, Ramona Ann, PA-C        Dose:  36 mg   Take 1 tablet (36 mg) by mouth every morning   Quantity:  30 tablet   Refills:  0       * methylphenidate ER 36 MG CR tablet   Commonly known as:  CONCERTA   This may have changed:  You were already taking a medication with the same name, and this prescription was added. Make sure you understand how and when to take each.   Used for:  Attention deficit hyperactivity disorder (ADHD), unspecified ADHD type   Changed by:  Aaseby-Aguilera, Ramona Ann, PA-C        Dose:  36 mg   Take 1 tablet (36 mg) by mouth every morning Please fill on or after 10/12/18   Quantity:  30 tablet   Refills:  0       norgestim-eth estrad triphasic 0.18/0.215/0.25 MG-35 MCG per tablet   Commonly known as:  TRI-ESTARYLLA   This may have changed:  See the new instructions.   Used for:  Well woman exam with routine gynecological exam   Changed by:  Aaseby-Aguilera, Ramona Ann, PA-C        Dose:  1 tablet   Take 1 tablet by mouth daily   Quantity:  84 tablet   Refills:  1       * Notice:  This list has 4 medication(s) that are the same as other medications prescribed for you. Read the directions carefully, and ask your doctor or other care provider to review them with you.         Where to get your medicines      These medications were sent to [id number] - [place], [place] - [address] [address]  [address] [address] [address]    Hours:  [facility] [employer] Phone:  [phone]     norgestim-eth estrad triphasic 0.18/0.215/0.25 MG-35 MCG per tablet         Some of these will  need a paper prescription and others can be bought over the counter.  Ask your nurse if you have questions.     Bring a paper prescription for each of these medications     methylphenidate ER 36 MG CR tablet                Primary Care Provider Office Phone # Fax #    Ramona Ann Aaseby-Aguilera, PA-C [phone] [address]  [facility] [address]        Equal Access to Services     LITA ALTAMIRANO : Hadii aad ku hadasho Soomaali, waaxda luqadaha, qaybta kaalmada adeegyada, waxay idiin hayaan adeeg kharash lamariel . So [facility] [phone].    ATENCIÓN: Si habjovanni canales, tiene a renteria disposición servicios gratuitos de asistencia lingüística. Llame al [phone].    We comply with applicable federal civil rights laws and [place] laws. We do not discriminate on the basis of race, color, national origin, age, disability, sex, sexual orientation, or gender identity.            Thank you!     Thank you for choosing [facility]  for your care. Our goal is always to provide you with excellent care. Hearing back from our patients is one way we can continue to improve our services. Please take a few minutes to complete the written survey that you may receive in the mail after your visit with us. Thank you!             Your Updated Medication List - Protect others around you: Learn how to safely use, store and throw away your medicines at www.disposemymeds.org.          This list is accurate as of 8/13/18  1:58 PM.  Always use your most recent med list.                   Brand Name Dispense Instructions for use Diagnosis    busPIRone 5 MG tablet    BUSPAR    60 tablet    Take 1 tablet (5 mg) by mouth 2 times daily    Anxiety       levothyroxine 25 MCG tablet    SYNTHROID/LEVOTHROID    30 tablet    TAKE 1 TABLET BY MOUTH ONCE DAILY    Hypothyroidism, unspecified type       * methylphenidate ER 36 MG CR tablet    CONCERTA    30 tablet    Take 1 tablet (36 mg) by mouth every morning    Attention  deficit hyperactivity disorder (ADHD), unspecified ADHD type       * methylphenidate ER 36 MG CR tablet    CONCERTA    30 tablet    Take 1 tablet (36 mg) by mouth every morning    Attention deficit hyperactivity disorder (ADHD), unspecified ADHD type       * methylphenidate ER 36 MG CR tablet    CONCERTA    30 tablet    Take 1 tablet (36 mg) by mouth every morning    Attention deficit hyperactivity disorder (ADHD), unspecified ADHD type       * methylphenidate ER 36 MG CR tablet    CONCERTA    30 tablet    Take 1 tablet (36 mg) by mouth every morning Please fill on or after 10/12/18    Attention deficit hyperactivity disorder (ADHD), unspecified ADHD type       norgestim-eth estrad triphasic 0.18/0.215/0.25 MG-35 MCG per tablet    TRI-ESTARYLLA    84 tablet    Take 1 tablet by mouth daily    Well woman exam with routine gynecological exam       order for DME     1 each    SAD light    Seasonal affective disorder (H)       PARoxetine 40 MG tablet    PAXIL    90 tablet    Take 1 tablet (40 mg) by mouth At Bedtime    Anxiety, Recurrent major depressive disorder, in partial remission (H), Social phobia       propranolol 60 MG 24 hr capsule    INDERAL LA    30 capsule    TAKE 1 CAPSULE (60 MG) BY MOUTH ONCE DAILY    Social phobia       * Notice:  This list has 4 medication(s) that are the same as other medications prescribed for you. Read the directions carefully, and ask your doctor or other care provider to review them with you.

## 2018-08-13 NOTE — PATIENT INSTRUCTIONS
(Z01.419) Well woman exam with routine gynecological exam  Comment:   Plan: norgestim-eth estrad triphasic (TRI-ESTARYLLA)         0.18/0.215/0.25 MG-35 MCG per tablet            (F90.9) Attention deficit hyperactivity disorder (ADHD), unspecified ADHD type  Comment: stable and doing well, follow-up in 6 months.  Make it a physical   Plan: methylphenidate ER (CONCERTA) 36 MG CR tablet,         DISCONTINUED: methylphenidate ER (CONCERTA) 36         MG CR tablet, DISCONTINUED: methylphenidate ER         (CONCERTA) 36 MG CR tablet

## 2018-09-18 ENCOUNTER — TELEPHONE (OUTPATIENT)
Dept: FAMILY MEDICINE | Facility: CLINIC | Age: 28
End: 2018-09-18

## 2018-09-18 NOTE — TELEPHONE ENCOUNTER
PA Initiation    Medication: METHYLPHENIDATE CR 36MG  Insurance Company: "[employer]" - Phone [phone] Fax [phone]  Pharmacy Filling the Rx: [id number] - [place], [place] - [address]  Filling Pharmacy Phone: [phone]  Filling Pharmacy Fax:    Start Date: 9/18/2018

## 2018-09-18 NOTE — TELEPHONE ENCOUNTER
Prior Authorization Retail Medication Request    Medication/Dose: methylphenidate ER (CONCERTA) 36 MG CR tablet  ICD code (if different than what is on RX):  Attention deficit hyperactivity disorder (ADHD), unspecified ADHD type [F90.9]   Previously Tried and Failed:  methylphenidate (CONCERTA) 54 MG CR tablet,  methylphenidate ER (CONCERTA) 27 MG CR tablet   Rationale:  Renewal     Insurance Name:  [employer]  Insurance ID:  [id number]      Pharmacy Information (if different than what is on RX)  [facility]  Phone:  [phone]    Jose PURVIS

## 2018-09-19 NOTE — TELEPHONE ENCOUNTER
Prior Authorization Approval    Authorization Effective Date: 9/18/2018  Authorization Expiration Date: 9/18/2019  Medication: METHYLPHENIDATE CR 36MG - APPROVED  Approved Dose/Quantity: DAILY  Reference #: [id number]   Insurance Company: [employer] - Phone [phone] Fax [phone]  Expected CoPay: NA     CoPay Card Available:      Foundation Assistance Needed:    Which Pharmacy is filling the prescription (Not needed for infusion/clinic administered): [id number] - [place], [place] - [address] [address]  Pharmacy Notified: Yes  Patient Notified: No

## 2019-03-08 DIAGNOSIS — Z30.41 ENCOUNTER FOR SURVEILLANCE OF CONTRACEPTIVE PILLS: ICD-10-CM

## 2019-03-08 RX ORDER — NORGESTIMATE AND ETHINYL ESTRADIOL 7DAYSX3 28
KIT ORAL
Qty: 28 TABLET | Refills: 0 | Status: SHIPPED | OUTPATIENT
Start: 2019-03-08 | End: 2019-08-16

## 2019-03-08 NOTE — TELEPHONE ENCOUNTER
Prescription approved per [facility] Refill Protocol, for one time fill.     LM for call back pt needs PX as below    Katy Armando RN       Return in about 6 months (around 2/13/2019) for Physical Exam.

## 2019-03-08 NOTE — TELEPHONE ENCOUNTER
"Requested Prescriptions   Pending Prescriptions Disp Refills     TRI FEMYNOR 0.18/0.215/0.25 MG-35 MCG tablet [Pharmacy Med Name: TRI FEMYNOR 28 TABLET] 84 tablet 1    Last Written Prescription Date:  08/13/2018  Last Fill Quantity: 84 tablet,  # refills: 1   Last office visit: 8/13/2018 with prescribing provider:  08/13/2018   Future Office Visit:     Sig: TAKE 1 TABLET BY MOUTH EVERY DAY    Contraceptives Protocol Passed - 3/8/2019  2:10 AM       Passed - Patient is not a current smoker if age is 35 or older       Passed - Recent (12 mo) or future (30 days) visit within the authorizing provider's specialty    Patient had office visit in the last 12 months or has a visit in the next 30 days with authorizing provider or within the authorizing provider's specialty.  See \"Patient Info\" tab in inbasket, or \"Choose Columns\" in Meds & Orders section of the refill encounter.             Passed - Medication is active on med list       Passed - No active pregnancy on record       Passed - No positive pregnancy test in past 12 months        Jose VELARDET  "

## 2019-04-08 ENCOUNTER — OFFICE VISIT (OUTPATIENT)
Dept: FAMILY MEDICINE | Facility: CLINIC | Age: 29
End: 2019-04-08
Payer: COMMERCIAL

## 2019-04-08 VITALS
HEIGHT: 66 IN | BODY MASS INDEX: 30.99 KG/M2 | TEMPERATURE: 98.8 F | HEART RATE: 75 BPM | SYSTOLIC BLOOD PRESSURE: 128 MMHG | DIASTOLIC BLOOD PRESSURE: 80 MMHG | RESPIRATION RATE: 16 BRPM | OXYGEN SATURATION: 100 %

## 2019-04-08 DIAGNOSIS — Z23 NEED FOR PROPHYLACTIC VACCINATION WITH TETANUS-DIPHTHERIA (TD): ICD-10-CM

## 2019-04-08 DIAGNOSIS — Z12.4 SCREENING FOR MALIGNANT NEOPLASM OF CERVIX: ICD-10-CM

## 2019-04-08 DIAGNOSIS — Z00.00 ROUTINE GENERAL MEDICAL EXAMINATION AT A HEALTH CARE FACILITY: ICD-10-CM

## 2019-04-08 DIAGNOSIS — Z13.0 SCREENING FOR BLOOD DISEASE: Primary | ICD-10-CM

## 2019-04-08 DIAGNOSIS — Z13.1 SCREENING FOR DIABETES MELLITUS: ICD-10-CM

## 2019-04-08 DIAGNOSIS — Z13.220 SCREENING, LIPID: ICD-10-CM

## 2019-04-08 DIAGNOSIS — Z11.4 SCREENING FOR HIV (HUMAN IMMUNODEFICIENCY VIRUS): ICD-10-CM

## 2019-04-08 DIAGNOSIS — F90.9 ATTENTION DEFICIT HYPERACTIVITY DISORDER (ADHD), UNSPECIFIED ADHD TYPE: ICD-10-CM

## 2019-04-08 LAB
ALBUMIN SERPL-MCNC: 4.1 G/DL (ref 3.4–5)
ALP SERPL-CCNC: 92 U/L (ref 40–150)
ALT SERPL W P-5'-P-CCNC: 22 U/L (ref 0–50)
ANION GAP SERPL CALCULATED.3IONS-SCNC: 7 MMOL/L (ref 3–14)
AST SERPL W P-5'-P-CCNC: 26 U/L (ref 0–45)
BILIRUB SERPL-MCNC: 0.5 MG/DL (ref 0.2–1.3)
BUN SERPL-MCNC: 11 MG/DL (ref 7–30)
CALCIUM SERPL-MCNC: 9.5 MG/DL (ref 8.5–10.1)
CHLORIDE SERPL-SCNC: 107 MMOL/L (ref 94–109)
CHOLEST SERPL-MCNC: 228 MG/DL
CO2 SERPL-SCNC: 25 MMOL/L (ref 20–32)
CREAT SERPL-MCNC: 0.72 MG/DL (ref 0.52–1.04)
ERYTHROCYTE [DISTWIDTH] IN BLOOD BY AUTOMATED COUNT: 12.3 % (ref 10–15)
GFR SERPL CREATININE-BSD FRML MDRD: >90 ML/MIN/{1.73_M2}
GLUCOSE SERPL-MCNC: 102 MG/DL (ref 70–99)
HCT VFR BLD AUTO: 39.2 % (ref 35–47)
HDLC SERPL-MCNC: 68 MG/DL
HGB BLD-MCNC: 13.5 G/DL (ref 11.7–15.7)
HIV 1+2 AB+HIV1 P24 AG SERPL QL IA: NONREACTIVE
LDLC SERPL CALC-MCNC: 149 MG/DL
MCH RBC QN AUTO: 31.1 PG (ref 26.5–33)
MCHC RBC AUTO-ENTMCNC: 34.4 G/DL (ref 31.5–36.5)
MCV RBC AUTO: 90 FL (ref 78–100)
NONHDLC SERPL-MCNC: 160 MG/DL
PLATELET # BLD AUTO: 383 10E9/L (ref 150–450)
POTASSIUM SERPL-SCNC: 3.1 MMOL/L (ref 3.4–5.3)
PROT SERPL-MCNC: 7.9 G/DL (ref 6.8–8.8)
RBC # BLD AUTO: 4.34 10E12/L (ref 3.8–5.2)
SODIUM SERPL-SCNC: 139 MMOL/L (ref 133–144)
TRIGL SERPL-MCNC: 55 MG/DL
WBC # BLD AUTO: 11.1 10E9/L (ref 4–11)

## 2019-04-08 PROCEDURE — 80061 LIPID PANEL: CPT | Performed by: PHYSICIAN ASSISTANT

## 2019-04-08 PROCEDURE — 85027 COMPLETE CBC AUTOMATED: CPT | Performed by: PHYSICIAN ASSISTANT

## 2019-04-08 PROCEDURE — 87389 HIV-1 AG W/HIV-1&-2 AB AG IA: CPT | Performed by: PHYSICIAN ASSISTANT

## 2019-04-08 PROCEDURE — 99395 PREV VISIT EST AGE 18-39: CPT | Mod: 25 | Performed by: PHYSICIAN ASSISTANT

## 2019-04-08 PROCEDURE — G0145 SCR C/V CYTO,THINLAYER,RESCR: HCPCS | Performed by: PHYSICIAN ASSISTANT

## 2019-04-08 PROCEDURE — 36415 COLL VENOUS BLD VENIPUNCTURE: CPT | Performed by: PHYSICIAN ASSISTANT

## 2019-04-08 PROCEDURE — 80053 COMPREHEN METABOLIC PANEL: CPT | Performed by: PHYSICIAN ASSISTANT

## 2019-04-08 PROCEDURE — 90471 IMMUNIZATION ADMIN: CPT | Performed by: PHYSICIAN ASSISTANT

## 2019-04-08 PROCEDURE — 90715 TDAP VACCINE 7 YRS/> IM: CPT | Performed by: PHYSICIAN ASSISTANT

## 2019-04-08 RX ORDER — METHYLPHENIDATE HYDROCHLORIDE 36 MG/1
36 TABLET ORAL EVERY MORNING
Qty: 30 TABLET | Refills: 0 | Status: SHIPPED | OUTPATIENT
Start: 2019-04-08 | End: 2019-08-02

## 2019-04-08 RX ORDER — METHYLPHENIDATE HYDROCHLORIDE 36 MG/1
36 TABLET ORAL EVERY MORNING
Qty: 30 TABLET | Refills: 0 | Status: SHIPPED | OUTPATIENT
Start: 2019-04-08 | End: 2019-04-08

## 2019-04-08 ASSESSMENT — ENCOUNTER SYMPTOMS
SORE THROAT: 0
DIARRHEA: 0
CONSTIPATION: 0
FREQUENCY: 0
PARESTHESIAS: 0
ABDOMINAL PAIN: 0
HEADACHES: 1
HEMATURIA: 0
PALPITATIONS: 0
JOINT SWELLING: 0
HEMATOCHEZIA: 0
CHILLS: 0
MYALGIAS: 0
ARTHRALGIAS: 0
COUGH: 0
SHORTNESS OF BREATH: 0
BREAST MASS: 0
WEAKNESS: 0
FEVER: 0
EYE PAIN: 0
NAUSEA: 0
HEARTBURN: 1
NERVOUS/ANXIOUS: 1
DYSURIA: 0
DIZZINESS: 0

## 2019-04-08 ASSESSMENT — ANXIETY QUESTIONNAIRES
5. BEING SO RESTLESS THAT IT IS HARD TO SIT STILL: SEVERAL DAYS
1. FEELING NERVOUS, ANXIOUS, OR ON EDGE: SEVERAL DAYS
7. FEELING AFRAID AS IF SOMETHING AWFUL MIGHT HAPPEN: SEVERAL DAYS
2. NOT BEING ABLE TO STOP OR CONTROL WORRYING: NOT AT ALL
6. BECOMING EASILY ANNOYED OR IRRITABLE: SEVERAL DAYS
IF YOU CHECKED OFF ANY PROBLEMS ON THIS QUESTIONNAIRE, HOW DIFFICULT HAVE THESE PROBLEMS MADE IT FOR YOU TO DO YOUR WORK, TAKE CARE OF THINGS AT HOME, OR GET ALONG WITH OTHER PEOPLE: SOMEWHAT DIFFICULT
GAD7 TOTAL SCORE: 5
3. WORRYING TOO MUCH ABOUT DIFFERENT THINGS: SEVERAL DAYS

## 2019-04-08 ASSESSMENT — PATIENT HEALTH QUESTIONNAIRE - PHQ9
5. POOR APPETITE OR OVEREATING: NOT AT ALL
SUM OF ALL RESPONSES TO PHQ QUESTIONS 1-9: 5

## 2019-04-08 NOTE — PROGRESS NOTES
"   SUBJECTIVE:   CC: Gayathri Gomez is an 28 year old woman who presents for preventive health visit.     Healthy Habits:    Do you get at least three servings of calcium containing foods daily (dairy, green leafy vegetables, etc.)? { :743575::\"yes\"}    Amount of exercise or daily activities, outside of work: { :840049}    Problems taking medications regularly { :547041::\"No\"}    Medication side effects: { :569644::\"No\"}    Have you had an eye exam in the past two years? { :280664}    Do you see a dentist twice per year? { :669060}    Do you have sleep apnea, excessive snoring or daytime drowsiness?{ :448453}  {Outside tests to abstract? :669802}    {additional problems to add (Optional):025568}    Today's PHQ-2 Score:   PHQ-2 ( 1999 Pfizer) 4/8/2019 4/16/2018   Q1: Little interest or pleasure in doing things 0 2   Q2: Feeling down, depressed or hopeless 1 2   PHQ-2 Score 1 4   Q1: Little interest or pleasure in doing things Not at all -   Q2: Feeling down, depressed or hopeless Several days -   PHQ-2 Score 1 -     {PHQ-2 LOOK IN ASSESSMENTS (Optional) :221073}  Abuse: Current or Past(Physical, Sexual or Emotional)- {YES/NO/NA:032520}  Do you feel safe in your environment? {YES/NO/NA:053095}    Social History     Tobacco Use     Smoking status: Current Some Day Smoker     Years: 5.00     Types: Cigarettes     Smokeless tobacco: Never Used   Substance Use Topics     Alcohol use: Yes     Alcohol/week: 0.0 oz     Comment: occ     If you drink alcohol do you typically have >3 drinks per day or >7 drinks per week? {ETOH :641064}                     Reviewed orders with patient.  Reviewed health maintenance and updated orders accordingly - {Yes/No:853569::\"Yes\"}  {Chronicprobdata (Optional):449899}    {Mammo Decision Support (Optional):880237}    Pertinent mammograms are reviewed under the imaging tab.  History of abnormal Pap smear: {PAP HX:494333}  PAP / HPV 5/19/2016 2/5/2013   PAP NIL NIL     Reviewed and updated as " "needed this visit by clinical staff  Tobacco  Allergies  Meds  Med Hx  Surg Hx  Fam Hx  Soc Hx        Reviewed and updated as needed this visit by Provider        {HISTORY OPTIONS (Optional):416512}    ROS:  { :517593}    OBJECTIVE:   /80 (BP Location: Right arm, Patient Position: Chair, Cuff Size: Adult Large)   Pulse 75   Temp 98.8  F (37.1  C) (Oral)   Resp 16   Ht 1.676 m (5' 6\")   LMP 04/04/2019 (Approximate)   SpO2 100%   Breastfeeding? No   BMI 30.99 kg/m    EXAM:  {Exam Choices:084996}    {Diagnostic Test Results (Optional):042077::\"Diagnostic Test Results:\",\"none \"}    ASSESSMENT/PLAN:   {Diag Picklist:708039}    COUNSELING:   {FEMALE COUNSELING MESSAGES:656018::\"Reviewed preventive health counseling, as reflected in patient instructions\"}    BP Readings from Last 1 Encounters:   04/08/19 128/80     Estimated body mass index is 30.99 kg/m  as calculated from the following:    Height as of this encounter: 1.676 m (5' 6\").    Weight as of 4/16/18: 87.1 kg (192 lb).    {BP Counseling- Complete if BP >= 120/80  (Optional):188242}  {Weight Management Plan (ACO) Complete if BMI is abnormal-  Ages 18-64  BMI >24.9.  Age 65+ with BMI <23 or >30 (Optional):023071}     reports that she has been smoking cigarettes.  She has smoked for the past 5.00 years. She has never used smokeless tobacco.  {Tobacco Cessation -- Complete if patient is a smoker (Optional):550265}    Counseling Resources:  ATP IV Guidelines  Pooled Cohorts Equation Calculator  Breast Cancer Risk Calculator  FRAX Risk Assessment  ICSI Preventive Guidelines  Dietary Guidelines for Americans, 2010  USDA's MyPlate  ASA Prophylaxis  Lung CA Screening    Ramona Ann Aaseby-Aguilera, PA-C  [facility]  "

## 2019-04-08 NOTE — PROGRESS NOTES
"   SUBJECTIVE:   CC: Gayathri Gomez is an 28 year old woman who presents for preventive health visit.     Healthy Habits:    Do you get at least three servings of calcium containing foods daily (dairy, green leafy vegetables, etc.)? { :409784::\"yes\"}    Amount of exercise or daily activities, outside of work: { :964718}    Problems taking medications regularly { :990563::\"No\"}    Medication side effects: { :293590::\"No\"}    Have you had an eye exam in the past two years? { :950336}    Do you see a dentist twice per year? { :740350}    Do you have sleep apnea, excessive snoring or daytime drowsiness?{ :300600}  {Outside tests to abstract? :810596}    {additional problems to add (Optional):021732}    Today's PHQ-2 Score:   PHQ-2 ( 1999 Pfizer) 4/8/2019 4/16/2018   Q1: Little interest or pleasure in doing things - 2   Q2: Feeling down, depressed or hopeless - 2   PHQ-2 Score - 4   PHQ-2 Score Incomplete -     {PHQ-2 LOOK IN ASSESSMENTS (Optional) :369588}  Abuse: Current or Past(Physical, Sexual or Emotional)- {YES/NO/NA:667702}  Do you feel safe in your environment? {YES/NO/NA:525183}    Social History     Tobacco Use     Smoking status: Current Some Day Smoker     Years: 5.00     Types: Cigarettes     Smokeless tobacco: Never Used   Substance Use Topics     Alcohol use: Yes     Alcohol/week: 0.0 oz     Comment: occ     If you drink alcohol do you typically have >3 drinks per day or >7 drinks per week? {ETOH :188582}                     Reviewed orders with patient.  Reviewed health maintenance and updated orders accordingly - {Yes/No:672677::\"Yes\"}  {Chronicprobdata (Optional):561120}    {Mammo Decision Support (Optional):619261}    Pertinent mammograms are reviewed under the imaging tab.  History of abnormal Pap smear: {PAP HX:584460}  PAP / HPV 5/19/2016 2/5/2013   PAP NIL NIL     Reviewed and updated as needed this visit by clinical staff  Tobacco  Allergies  Meds  Med Hx  Surg Hx  Fam Hx  Soc Hx    " "    Reviewed and updated as needed this visit by Provider        {HISTORY OPTIONS (Optional):650470}    ROS:  { :266060}    OBJECTIVE:   There were no vitals taken for this visit.  EXAM:  {Exam Choices:087875}    {Diagnostic Test Results (Optional):353220::\"Diagnostic Test Results:\",\"none \"}    ASSESSMENT/PLAN:   {Diag Picklist:608445}    COUNSELING:   {FEMALE COUNSELING MESSAGES:882325::\"Reviewed preventive health counseling, as reflected in patient instructions\"}    BP Readings from Last 1 Encounters:   08/13/18 128/83     Estimated body mass index is 30.99 kg/m  as calculated from the following:    Height as of 8/13/18: 1.676 m (5' 6\").    Weight as of 4/16/18: 87.1 kg (192 lb).    {BP Counseling- Complete if BP >= 120/80  (Optional):843125}  {Weight Management Plan (ACO) Complete if BMI is abnormal-  Ages 18-64  BMI >24.9.  Age 65+ with BMI <23 or >30 (Optional):121430}     reports that she has been smoking cigarettes.  She has smoked for the past 5.00 years. She has never used smokeless tobacco.  {Tobacco Cessation -- Complete if patient is a smoker (Optional):650298}    Counseling Resources:  ATP IV Guidelines  Pooled Cohorts Equation Calculator  Breast Cancer Risk Calculator  FRAX Risk Assessment  ICSI Preventive Guidelines  Dietary Guidelines for Americans, 2010  USDA's MyPlate  ASA Prophylaxis  Lung CA Screening    Ramona Ann Aaseby-Aguilera, PA-C  [facility]  "

## 2019-04-08 NOTE — NURSING NOTE
Screening Questionnaire for Adult Immunization    Are you sick today?   No   Do you have allergies to medications, food, a vaccine component or latex?   No   Have you ever had a serious reaction after receiving a vaccination?   No   Do you have a long-term health problem with heart disease, lung disease, asthma, kidney disease, metabolic disease (e.g. diabetes), anemia, or other blood disorder?   No   Do you have cancer, leukemia, HIV/AIDS, or any other immune system problem?   No   In the past 3 months, have you taken medications that affect  your immune system, such as prednisone, other steroids, or anticancer drugs; drugs for the treatment of rheumatoid arthritis, Crohn s disease, or psoriasis; or have you had radiation treatments?   No   Have you had a seizure, or a brain or other nervous system problem?   No   During the past year, have you received a transfusion of blood or blood     products, or been given immune (gamma) globulin or antiviral drug?   No   For women: Are you pregnant or is there a chance you could become        pregnant during the next month?   No   Have you received any vaccinations in the past 4 weeks?   No     Immunization questionnaire answers were all negative.        Per orders of Dr. rea, injection of tdap given by Robin Cm. Patient instructed to remain in clinic for 15 minutes afterwards, and to report any adverse reaction to me immediately.       Screening performed by Robin Cm on 4/8/2019 at 9:09 AM.

## 2019-04-08 NOTE — PATIENT INSTRUCTIONS
ADHD: stable and doing well on current medication.  Follow-up in 6 months             Preventive Health Recommendations  Female Ages 26 - 39  Yearly exam:   See your health care provider every year in order to    Review health changes.     Discuss preventive care.      Review your medicines if you your doctor has prescribed any.    Until age 30: Get a Pap test every three years (more often if you have had an abnormal result).    After age 30: Talk to your doctor about whether you should have a Pap test every 3 years or have a Pap test with HPV screening every 5 years.   You do not need a Pap test if your uterus was removed (hysterectomy) and you have not had cancer.  You should be tested each year for STDs (sexually transmitted diseases), if you're at risk.   Talk to your provider about how often to have your cholesterol checked.  If you are at risk for diabetes, you should have a diabetes test (fasting glucose).  Shots: Get a flu shot each year. Get a tetanus shot every 10 years.   Nutrition:     Eat at least 5 servings of fruits and vegetables each day.    Eat whole-grain bread, whole-wheat pasta and brown rice instead of white grains and rice.    Get adequate Calcium and Vitamin D.     Lifestyle    Exercise at least 150 minutes a week (30 minutes a day, 5 days of the week). This will help you control your weight and prevent disease.    Limit alcohol to one drink per day.    No smoking.     Wear sunscreen to prevent skin cancer.    See your dentist every six months for an exam and cleaning.

## 2019-04-09 ASSESSMENT — ANXIETY QUESTIONNAIRES: GAD7 TOTAL SCORE: 5

## 2019-04-11 LAB
COPATH REPORT: NORMAL
PAP: NORMAL

## 2019-04-23 DIAGNOSIS — E03.9 HYPOTHYROIDISM, UNSPECIFIED TYPE: ICD-10-CM

## 2019-04-23 RX ORDER — LEVOTHYROXINE SODIUM 25 UG/1
25 TABLET ORAL DAILY
Qty: 30 TABLET | Refills: 0 | Status: SHIPPED | OUTPATIENT
Start: 2019-04-23 | End: 2019-05-22

## 2019-04-23 NOTE — TELEPHONE ENCOUNTER
Routing refill request to provider for review/approval because:  Labs not current:  TSH not done since 3/20/2017 and pt was just seen 4/8/2019  Vick Patterson RN, BSN

## 2019-04-23 NOTE — TELEPHONE ENCOUNTER
.  Fill synthroid x 1 month.     Please call and let her know to come in for this lab.  Was missed during last labs.  Non-fasting

## 2019-04-23 NOTE — TELEPHONE ENCOUNTER
"Requested Prescriptions   Pending Prescriptions Disp Refills     levothyroxine (SYNTHROID/LEVOTHROID) 25 MCG tablet 30 tablet 9     Sig: Take 1 tablet (25 mcg) by mouth daily     Last Written Prescription Date:  04/18/2018  Last Fill Quantity: 30 tablet,  # refills: 9   Last office visit: 4/8/2019 with prescribing provider:  04/08/2019   Future Office Visit:          Thyroid Protocol Failed - 4/23/2019  3:12 PM        Failed - Normal TSH on file in past 12 months     Recent Labs   Lab Test 03/20/17  1404   TSH 2.59              Passed - Patient is 12 years or older        Passed - Recent (12 mo) or future (30 days) visit within the authorizing provider's specialty     Patient had office visit in the last 12 months or has a visit in the next 30 days with authorizing provider or within the authorizing provider's specialty.  See \"Patient Info\" tab in inbasket, or \"Choose Columns\" in Meds & Orders section of the refill encounter.              Passed - Medication is active on med list        Passed - No active pregnancy on record     If patient is pregnant or has had a positive pregnancy test, please check TSH.          Passed - No positive pregnancy test in past 12 months     If patient is pregnant or has had a positive pregnancy test, please check TSH.          Jose Sanchez XRT  "

## 2019-04-29 DIAGNOSIS — E03.9 HYPOTHYROIDISM, UNSPECIFIED TYPE: ICD-10-CM

## 2019-04-29 LAB — TSH SERPL DL<=0.005 MIU/L-ACNC: 3.9 MU/L (ref 0.4–4)

## 2019-04-29 PROCEDURE — 84443 ASSAY THYROID STIM HORMONE: CPT | Performed by: PHYSICIAN ASSISTANT

## 2019-04-29 PROCEDURE — 36415 COLL VENOUS BLD VENIPUNCTURE: CPT | Performed by: PHYSICIAN ASSISTANT

## 2019-05-19 DIAGNOSIS — F33.41 RECURRENT MAJOR DEPRESSIVE DISORDER, IN PARTIAL REMISSION (H): ICD-10-CM

## 2019-05-19 DIAGNOSIS — F41.9 ANXIETY: ICD-10-CM

## 2019-05-19 DIAGNOSIS — F40.10 SOCIAL PHOBIA: ICD-10-CM

## 2019-05-19 NOTE — TELEPHONE ENCOUNTER
"Requested Prescriptions   Pending Prescriptions Disp Refills     PARoxetine (PAXIL) 40 MG tablet [Pharmacy Med Name: PAROXETINE HCL 40 MG TABLET]  Last Written Prescription Date:  09/06/2018  Last Fill Quantity: 30,  # refills: 5   Last office visit: 4/8/2019 with prescribing provider:  04/08/2019   Future Office Visit:     30 tablet 5     Sig: TAKE 1 TABLET BY MOUTH EVERYDAY AT BEDTIME       SSRIs Protocol Failed - 5/19/2019 12:32 AM        Failed - PHQ-9 score less than 5 in past 6 months  [facility] Follow-up to PHQ 10/23/2017 4/16/2018 4/8/2019   PHQ-9 9. Suicide Ideation past 2 weeks Not at all Not at all Several days   Thoughts of suicide or self harm in past 2 weeks - - No   PHQ-9 Safety concerns? - - No          Please review last PHQ-9 score.           Passed - Medication is active on med list        Passed - Patient is age 18 or older        Passed - No active pregnancy on record        Passed - No positive pregnancy test in last 12 months        Passed - Recent (6 mo) or future (30 days) visit within the authorizing provider's specialty     Patient had office visit in the last 6 months or has a visit in the next 30 days with authorizing provider or within the authorizing provider's specialty.  See \"Patient Info\" tab in inbasket, or \"Choose Columns\" in Meds & Orders section of the refill encounter.              "

## 2019-05-20 RX ORDER — PAROXETINE 40 MG/1
TABLET, FILM COATED ORAL
Qty: 30 TABLET | Refills: 5 | Status: SHIPPED | OUTPATIENT
Start: 2019-05-20 | End: 2019-12-03

## 2019-05-20 NOTE — TELEPHONE ENCOUNTER
PHQ-9 SCORE 10/23/2017 4/16/2018 4/8/2019   PHQ-9 Total Score - - -   PHQ-9 Total Score 8 11 5     Routing refill request to provider for review/approval because:  Labs out of range:  PHQ  Vick Patterson RN, BSN

## 2019-05-22 ENCOUNTER — MYC MEDICAL ADVICE (OUTPATIENT)
Dept: NURSING | Facility: CLINIC | Age: 29
End: 2019-05-22

## 2019-05-22 DIAGNOSIS — E03.9 HYPOTHYROIDISM: Primary | ICD-10-CM

## 2019-05-22 DIAGNOSIS — E03.9 HYPOTHYROIDISM, UNSPECIFIED TYPE: ICD-10-CM

## 2019-05-22 NOTE — TELEPHONE ENCOUNTER
Laury sent to patient to verify if pt has been taking consistently for 2 month as she is on the higher side and may need a dose change.  Per result note:    Dear Gayathri,   Here are your recent results. Your TSH is  within the normal range but on high side of normal.  If you have been taking your synthroid consistently for at least 2 months , then I would like to increase the dosage to 50 mcg.  Please let me know       Ramona Ann Aaseby-Aguilera, PA-C

## 2019-05-22 NOTE — TELEPHONE ENCOUNTER
"Requested Prescriptions   Pending Prescriptions Disp Refills     levothyroxine (SYNTHROID/LEVOTHROID) 25 MCG tablet [Pharmacy Med Name: LEVOTHYROXINE 25 MCG TABLET] 30 tablet 0     Sig: TAKE 1 TABLET (25 MCG) BY MOUTH DAILY     Last Written Prescription Date:  04/23/2019  Last Fill Quantity: 30 tablet,  # refills: 0   Last office visit: 4/8/2019 with prescribing provider:  04/08/2019   Future Office Visit:          Thyroid Protocol Passed - 5/22/2019  1:39 AM        Passed - Patient is 12 years or older        Passed - Recent (12 mo) or future (30 days) visit within the authorizing provider's specialty     Patient had office visit in the last 12 months or has a visit in the next 30 days with authorizing provider or within the authorizing provider's specialty.  See \"Patient Info\" tab in inbasket, or \"Choose Columns\" in Meds & Orders section of the refill encounter.              Passed - Medication is active on med list        Passed - Normal TSH on file in past 12 months     Recent Labs   Lab Test 04/29/19  0846   TSH 3.90              Passed - No active pregnancy on record     If patient is pregnant or has had a positive pregnancy test, please check TSH.          Passed - No positive pregnancy test in past 12 months     If patient is pregnant or has had a positive pregnancy test, please check TSH.          Jose Sanchez XRT  "

## 2019-05-28 RX ORDER — LEVOTHYROXINE SODIUM 50 UG/1
50 TABLET ORAL DAILY
Qty: 90 TABLET | Refills: 0 | Status: SHIPPED | OUTPATIENT
Start: 2019-05-28 | End: 2019-07-20

## 2019-06-14 DIAGNOSIS — E03.9 HYPOTHYROIDISM, UNSPECIFIED TYPE: ICD-10-CM

## 2019-06-14 RX ORDER — LEVOTHYROXINE SODIUM 25 UG/1
25 TABLET ORAL DAILY
Qty: 30 TABLET | Refills: 0 | OUTPATIENT
Start: 2019-06-14

## 2019-06-14 NOTE — TELEPHONE ENCOUNTER
"Requested Prescriptions   Pending Prescriptions Disp Refills     levothyroxine (SYNTHROID/LEVOTHROID) 25 MCG tablet [Pharmacy Med Name: LEVOTHYROXINE 25 MCG TABLET] 30 tablet 0     Sig: TAKE 1 TABLET (25 MCG) BY MOUTH DAILY     Last Written Prescription Date:  05/28/2019  Last Fill Quantity: 90 tablet,  # refills: 0   Last office visit: 4/8/2019 with prescribing provider:  04/08/2019   Future Office Visit:   Next 5 appointments (look out 90 days)    Jun 17, 2019  3:45 PM CDT  SHORT with Ramona Ann Aaseby-Aguilera, PA-C  [facility] ([facility] [address] [place] [id number] [phone]               Thyroid Protocol Passed - 6/14/2019  3:38 AM        Passed - Patient is 12 years or older        Passed - Recent (12 mo) or future (30 days) visit within the authorizing provider's specialty     Patient had office visit in the last 12 months or has a visit in the next 30 days with authorizing provider or within the authorizing provider's specialty.  See \"Patient Info\" tab in inbasket, or \"Choose Columns\" in Meds & Orders section of the refill encounter.              Passed - Medication is active on med list        Passed - Normal TSH on file in past 12 months     Recent Labs   Lab Test 04/29/19  0846   TSH 3.90              Passed - No active pregnancy on record     If patient is pregnant or has had a positive pregnancy test, please check TSH.          Passed - No positive pregnancy test in past 12 months     If patient is pregnant or has had a positive pregnancy test, please check TSH.          Jose Sanchez XRT  "

## 2019-06-17 ENCOUNTER — OFFICE VISIT (OUTPATIENT)
Dept: FAMILY MEDICINE | Facility: CLINIC | Age: 29
End: 2019-06-17
Payer: COMMERCIAL

## 2019-06-17 VITALS
DIASTOLIC BLOOD PRESSURE: 80 MMHG | BODY MASS INDEX: 30.99 KG/M2 | HEIGHT: 66 IN | OXYGEN SATURATION: 100 % | SYSTOLIC BLOOD PRESSURE: 128 MMHG | TEMPERATURE: 97.8 F | HEART RATE: 82 BPM

## 2019-06-17 DIAGNOSIS — N89.8 VAGINAL DISCHARGE: Primary | ICD-10-CM

## 2019-06-17 LAB
SPECIMEN SOURCE: NORMAL
WET PREP SPEC: NORMAL

## 2019-06-17 PROCEDURE — 99214 OFFICE O/P EST MOD 30 MIN: CPT | Performed by: PHYSICIAN ASSISTANT

## 2019-06-17 PROCEDURE — 87210 SMEAR WET MOUNT SALINE/INK: CPT | Performed by: PHYSICIAN ASSISTANT

## 2019-06-17 RX ORDER — FAMOTIDINE 10 MG
10 TABLET ORAL 2 TIMES DAILY PRN
COMMUNITY
End: 2024-07-29

## 2019-06-17 RX ORDER — METRONIDAZOLE 7.5 MG/G
1 GEL VAGINAL DAILY
Qty: 70 G | Refills: 0 | Status: SHIPPED | OUTPATIENT
Start: 2019-06-17 | End: 2019-08-16

## 2019-06-17 ASSESSMENT — ANXIETY QUESTIONNAIRES
6. BECOMING EASILY ANNOYED OR IRRITABLE: SEVERAL DAYS
3. WORRYING TOO MUCH ABOUT DIFFERENT THINGS: SEVERAL DAYS
IF YOU CHECKED OFF ANY PROBLEMS ON THIS QUESTIONNAIRE, HOW DIFFICULT HAVE THESE PROBLEMS MADE IT FOR YOU TO DO YOUR WORK, TAKE CARE OF THINGS AT HOME, OR GET ALONG WITH OTHER PEOPLE: NOT DIFFICULT AT ALL
2. NOT BEING ABLE TO STOP OR CONTROL WORRYING: NOT AT ALL
1. FEELING NERVOUS, ANXIOUS, OR ON EDGE: SEVERAL DAYS
GAD7 TOTAL SCORE: 5
7. FEELING AFRAID AS IF SOMETHING AWFUL MIGHT HAPPEN: SEVERAL DAYS
5. BEING SO RESTLESS THAT IT IS HARD TO SIT STILL: SEVERAL DAYS

## 2019-06-17 ASSESSMENT — PATIENT HEALTH QUESTIONNAIRE - PHQ9
5. POOR APPETITE OR OVEREATING: NOT AT ALL
SUM OF ALL RESPONSES TO PHQ QUESTIONS 1-9: 4

## 2019-06-17 NOTE — PROGRESS NOTES
"Subjective     Gayathri Gomez is a 28 year old female who presents to clinic today for the following health issues:    HPI   Vaginal odor for the past few weeks.  No itching or burning. No STD risks.                   Reviewed and updated as needed this visit by Provider         Review of Systems   ROS COMP: Constitutional, HEENT, cardiovascular, pulmonary, gi and gu systems are negative, except as otherwise noted.      Objective    /80 (BP Location: Right arm, Patient Position: Chair, Cuff Size: Adult Large)   Pulse 82   Temp 97.8  F (36.6  C) (Oral)   Ht 1.676 m (5' 6\")   LMP 05/27/2019   SpO2 100%   BMI 30.99 kg/m    Body mass index is 30.99 kg/m .  Physical Exam   GENERAL: healthy, alert and no distress  RESP: lungs clear to auscultation - no rales, rhonchi or wheezes  CV: regular rate and rhythm, normal S1 S2, no S3 or S4, no murmur, click or rub, no peripheral edema and peripheral pulses strong   (female): normal female external genitalia, normal urethral meatus, vaginal mucosa pink, moist, well rugated, and normal cervix/adnexa/uterus without masses or discharge  NEURO: Normal strength and tone, mentation intact and speech normal  PSYCH: mentation appears normal, affect normal/bright            Assessment & Plan     1. Vaginal discharge  Wet prep negative.    - Wet prep  - metroNIDAZOLE (METROGEL) 0.75 % vaginal gel; Place 1 applicator (5 g) vaginally daily  Dispense: 70 g; Refill: 0     Tobacco Cessation:   reports that she has been smoking cigarettes.  She has smoked for the past 5.00 years. She has never used smokeless tobacco.          See Patient Instructions    No follow-ups on file.    Ramona Ann Aaseby-Aguilera, PA-C  [facility]      "

## 2019-06-17 NOTE — PATIENT INSTRUCTIONS
(N89.8) Vaginal discharge  (primary encounter diagnosis)  Comment:   Plan: Wet prep, metroNIDAZOLE (METROGEL) 0.75 %         vaginal gel

## 2019-06-18 ASSESSMENT — ANXIETY QUESTIONNAIRES: GAD7 TOTAL SCORE: 5

## 2019-06-19 DIAGNOSIS — E03.9 HYPOTHYROIDISM, UNSPECIFIED TYPE: ICD-10-CM

## 2019-06-19 RX ORDER — LEVOTHYROXINE SODIUM 50 UG/1
TABLET ORAL
Qty: 90 TABLET | Refills: 0 | OUTPATIENT
Start: 2019-06-19

## 2019-06-19 NOTE — TELEPHONE ENCOUNTER
"Requested Prescriptions   Pending Prescriptions Disp Refills     levothyroxine (SYNTHROID/LEVOTHROID) 50 MCG tablet [Pharmacy Med Name: LEVOTHYROXINE 50 MCG TABLET] 90 tablet 0     Sig: TAKE 1 TABLET BY MOUTH EVERY DAY     Last Written Prescription Date:  05/28/2019  Last Fill Quantity: 90 tablet,  # refills: 0   Last office visit: 6/17/2019 with prescribing provider:  06/17/2019   Future Office Visit:          Thyroid Protocol Passed - 6/19/2019  9:32 AM        Passed - Patient is 12 years or older        Passed - Recent (12 mo) or future (30 days) visit within the authorizing provider's specialty     Patient had office visit in the last 12 months or has a visit in the next 30 days with authorizing provider or within the authorizing provider's specialty.  See \"Patient Info\" tab in inbasket, or \"Choose Columns\" in Meds & Orders section of the refill encounter.              Passed - Medication is active on med list        Passed - Normal TSH on file in past 12 months     Recent Labs   Lab Test 04/29/19  0846   TSH 3.90              Passed - No active pregnancy on record     If patient is pregnant or has had a positive pregnancy test, please check TSH.          Passed - No positive pregnancy test in past 12 months     If patient is pregnant or has had a positive pregnancy test, please check TSH.          Jose Sanchez XRT  "

## 2019-06-19 NOTE — TELEPHONE ENCOUNTER
Too soon to refill  E-Prescribing Status: Receipt confirmed by pharmacy (5/28/2019  7:32 AM CDT)  Vick Patterson RN, BSN

## 2019-06-20 DIAGNOSIS — E03.9 HYPOTHYROIDISM, UNSPECIFIED TYPE: ICD-10-CM

## 2019-06-20 RX ORDER — LEVOTHYROXINE SODIUM 50 UG/1
TABLET ORAL
Qty: 90 TABLET | Refills: 0 | OUTPATIENT
Start: 2019-06-20

## 2019-06-20 NOTE — TELEPHONE ENCOUNTER
"Requested Prescriptions   Pending Prescriptions Disp Refills     levothyroxine (SYNTHROID/LEVOTHROID) 50 MCG tablet [Pharmacy Med Name: LEVOTHYROXINE 50 MCG TABLET] 90 tablet 0     Sig: TAKE 1 TABLET BY MOUTH EVERY DAY     Last Written Prescription Date:  05/28/2019  Last Fill Quantity: 90 tablet,  # refills: 0   Last office visit: 6/17/2019 with prescribing provider:  06/17/2019   Future Office Visit:          Thyroid Protocol Passed - 6/20/2019 11:32 AM        Passed - Patient is 12 years or older        Passed - Recent (12 mo) or future (30 days) visit within the authorizing provider's specialty     Patient had office visit in the last 12 months or has a visit in the next 30 days with authorizing provider or within the authorizing provider's specialty.  See \"Patient Info\" tab in inbasket, or \"Choose Columns\" in Meds & Orders section of the refill encounter.              Passed - Medication is active on med list        Passed - Normal TSH on file in past 12 months     Recent Labs   Lab Test 04/29/19  0846   TSH 3.90              Passed - No active pregnancy on record     If patient is pregnant or has had a positive pregnancy test, please check TSH.          Passed - No positive pregnancy test in past 12 months     If patient is pregnant or has had a positive pregnancy test, please check TSH.            Jose Sanchez XRT  "

## 2019-06-20 NOTE — TELEPHONE ENCOUNTER
LM needs lab only for TSH     Should not need RF as this was sent for 90 day on 5/28    Katy Armando RN

## 2019-06-24 DIAGNOSIS — F40.10 SOCIAL PHOBIA: ICD-10-CM

## 2019-06-24 NOTE — TELEPHONE ENCOUNTER
"Requested Prescriptions   Pending Prescriptions Disp Refills     propranolol ER (INDERAL LA) 60 MG 24 hr capsule [Pharmacy Med Name: PROPRANOLOL ER 60 MG CAPSULE] 30 capsule 4     Sig: TAKE ONE CAPSULE BY MOUTH EVERY DAY     Last Written Prescription Date:  11/15/2018  Last Fill Quantity: 30 capsule,  # refills: 5   Last office visit: 6/17/2019 with prescribing provider:  06/17/2019   Future Office Visit:          Beta-Blockers Protocol Passed - 6/24/2019  4:13 AM        Passed - Blood pressure under 140/90 in past 12 months     BP Readings from Last 3 Encounters:   06/17/19 128/80   04/08/19 128/80   08/13/18 128/83                 Passed - Patient is age 6 or older        Passed - Recent (12 mo) or future (30 days) visit within the authorizing provider's specialty     Patient had office visit in the last 12 months or has a visit in the next 30 days with authorizing provider or within the authorizing provider's specialty.  See \"Patient Info\" tab in inbasket, or \"Choose Columns\" in Meds & Orders section of the refill encounter.              Passed - Medication is active on med list        Jose VELARDET  "

## 2019-06-25 RX ORDER — PROPRANOLOL HCL 60 MG
CAPSULE, EXTENDED RELEASE 24HR ORAL
Qty: 90 CAPSULE | Refills: 3 | Status: SHIPPED | OUTPATIENT
Start: 2019-06-25 | End: 2020-06-29

## 2019-06-25 NOTE — TELEPHONE ENCOUNTER
Prescription approved per [facility] [place] [facility].  Vick Patterson RN, BSN

## 2019-07-20 DIAGNOSIS — E03.9 HYPOTHYROIDISM, UNSPECIFIED TYPE: ICD-10-CM

## 2019-07-20 NOTE — TELEPHONE ENCOUNTER
"Requested Prescriptions   Pending Prescriptions Disp Refills     levothyroxine (SYNTHROID/LEVOTHROID) 50 MCG tablet [Pharmacy Med Name: LEVOTHYROXINE 50 MCG TABLET]  Last Written Prescription Date:  5/28/2019  Last Fill Quantity: 90 tablet,  # refills: 0   Last office visit: 6/17/2019 with prescribing provider:  Aaseby-Aguilera   Future Office Visit:     30 tablet 2     Sig: TAKE 1 TABLET BY MOUTH EVERY DAY       Thyroid Protocol Passed - 7/20/2019  8:36 AM        Passed - Patient is 12 years or older        Passed - Recent (12 mo) or future (30 days) visit within the authorizing provider's specialty     Patient had office visit in the last 12 months or has a visit in the next 30 days with authorizing provider or within the authorizing provider's specialty.  See \"Patient Info\" tab in inbasket, or \"Choose Columns\" in Meds & Orders section of the refill encounter.              Passed - Medication is active on med list        Passed - Normal TSH on file in past 12 months     Recent Labs   Lab Test 04/29/19  0846   TSH 3.90              Passed - No active pregnancy on record     If patient is pregnant or has had a positive pregnancy test, please check TSH.          Passed - No positive pregnancy test in past 12 months     If patient is pregnant or has had a positive pregnancy test, please check TSH.            "

## 2019-07-24 RX ORDER — LEVOTHYROXINE SODIUM 50 UG/1
TABLET ORAL
Qty: 30 TABLET | Refills: 0 | Status: SHIPPED | OUTPATIENT
Start: 2019-07-24 | End: 2019-08-16

## 2019-07-24 NOTE — TELEPHONE ENCOUNTER
Routing refill request to provider for review/approval because:  Pt due for labs and has not schedule.  Dose was changed and pt read luis Patterson RN, BSN

## 2019-08-05 DIAGNOSIS — E03.9 HYPOTHYROIDISM: ICD-10-CM

## 2019-08-05 LAB — TSH SERPL DL<=0.005 MIU/L-ACNC: 2.36 MU/L (ref 0.4–4)

## 2019-08-05 PROCEDURE — 84443 ASSAY THYROID STIM HORMONE: CPT | Performed by: PHYSICIAN ASSISTANT

## 2019-08-05 PROCEDURE — 36415 COLL VENOUS BLD VENIPUNCTURE: CPT | Performed by: PHYSICIAN ASSISTANT

## 2019-08-16 ENCOUNTER — OFFICE VISIT (OUTPATIENT)
Dept: FAMILY MEDICINE | Facility: CLINIC | Age: 29
End: 2019-08-16
Payer: COMMERCIAL

## 2019-08-16 VITALS
RESPIRATION RATE: 17 BRPM | OXYGEN SATURATION: 98 % | SYSTOLIC BLOOD PRESSURE: 122 MMHG | BODY MASS INDEX: 30.99 KG/M2 | DIASTOLIC BLOOD PRESSURE: 80 MMHG | HEART RATE: 82 BPM | TEMPERATURE: 98.7 F | HEIGHT: 66 IN

## 2019-08-16 DIAGNOSIS — E03.9 HYPOTHYROIDISM, UNSPECIFIED TYPE: ICD-10-CM

## 2019-08-16 DIAGNOSIS — F90.9 ATTENTION DEFICIT HYPERACTIVITY DISORDER (ADHD), UNSPECIFIED ADHD TYPE: Primary | ICD-10-CM

## 2019-08-16 PROCEDURE — 99214 OFFICE O/P EST MOD 30 MIN: CPT | Performed by: PHYSICIAN ASSISTANT

## 2019-08-16 RX ORDER — LEVOTHYROXINE SODIUM 50 UG/1
50 TABLET ORAL DAILY
Qty: 90 TABLET | Refills: 3 | Status: SHIPPED | OUTPATIENT
Start: 2019-08-16 | End: 2020-09-02

## 2019-08-16 NOTE — PATIENT INSTRUCTIONS
(F90.9) Attention deficit hyperactivity disorder (ADHD), unspecified ADHD type  (primary encounter diagnosis)  Comment:   Plan: stable and doing well on current treatment     (E03.9) Hypothyroidism, unspecified type  Comment:   Plan: stable   Refilled x 1 year

## 2019-08-16 NOTE — PROGRESS NOTES
"Subjective     Gayathri Gomez is a 28 year old female who presents to clinic today for the following health issues:      ADHD:  Takes every day.  Works well.  Has refills until October.       HPI   Medication Followup of synthroid    Taking Medication as prescribed: yes    Side Effects:  None    Medication Helping Symptoms:           BP Readings from Last 3 Encounters:   08/16/19 122/80   06/17/19 128/80   04/08/19 128/80    Wt Readings from Last 3 Encounters:   04/16/18 87.1 kg (192 lb)   12/13/17 87.1 kg (192 lb)   10/23/17 87.1 kg (192 lb)                      Reviewed and updated as needed this visit by Provider         Review of Systems   ROS COMP: Constitutional, HEENT, cardiovascular, pulmonary, gi and gu systems are negative, except as otherwise noted.      Objective    /80 (BP Location: Right arm, Patient Position: Chair, Cuff Size: Adult Large)   Pulse 82   Temp 98.7  F (37.1  C) (Oral)   Resp 17   Ht 1.676 m (5' 6\")   SpO2 98%   BMI 30.99 kg/m    Body mass index is 30.99 kg/m .  Physical Exam   GENERAL: healthy, alert and no distress  HENT: ear canals and TM's normal, nose and mouth without ulcers or lesions  NECK: no adenopathy, no asymmetry, masses, or scars and thyroid normal to palpation  RESP: lungs clear to auscultation - no rales, rhonchi or wheezes  CV: regular rate and rhythm, normal S1 S2, no S3 or S4, no murmur, click or rub, no peripheral edema and peripheral pulses strong  PSYCH: mentation appears normal, affect normal/bright    Diagnostic Test Results:  Labs reviewed in Epic        Assessment & Plan     1. Hypothyroidism, unspecified type  Stable   - levothyroxine (SYNTHROID/LEVOTHROID) 50 MCG tablet; Take 1 tablet (50 mcg) by mouth daily  Dispense: 90 tablet; Refill: 3    2. Attention deficit hyperactivity disorder (ADHD), unspecified ADHD type  Stable on current dosage.  Follow-up in 6 months        Tobacco Cessation:   reports that she has been smoking cigarettes.  She has " smoked for the past 5.00 years. She has never used smokeless tobacco.          Patient Instructions   (F90.9) Attention deficit hyperactivity disorder (ADHD), unspecified ADHD type  (primary encounter diagnosis)  Comment:   Plan: stable and doing well on current treatment     (E03.9) Hypothyroidism, unspecified type  Comment:   Plan: stable   Refilled x 1 year           No follow-ups on file.    Ramona Ann Aaseby-Aguilera, PA-C  [facility]

## 2019-10-21 ENCOUNTER — TELEPHONE (OUTPATIENT)
Dept: FAMILY MEDICINE | Facility: CLINIC | Age: 29
End: 2019-10-21

## 2019-10-21 NOTE — TELEPHONE ENCOUNTER
Prior Authorization Approval    Authorization Effective Date: 10/21/2019  Authorization Expiration Date: 10/20/2020  Medication: methylphenidate (CONCERTA) 36 MG CR tablet - APPROVED  Approved Dose/Quantity: 30 tablets per 30 days  Reference #: [id number]   Insurance Company: [employer] - Phone [phone] Fax [phone]  Expected CoPay:       CoPay Card Available:      Foundation Assistance Needed:    Which Pharmacy is filling the prescription (Not needed for infusion/clinic administered): [id number] - [place], [place] - [address] [address]  Pharmacy Notified: Yes  Patient Notified: Yes

## 2019-10-21 NOTE — TELEPHONE ENCOUNTER
Prior Authorization Retail Medication Request    Medication/Dose: methylphenidate (CONCERTA) 36 MG CR tablet  ICD code (if different than what is on RX):    Previously Tried and Failed:  None  Rationale:  Renewal    Prior Authorization Approval     Authorization Effective Date: 9/18/2018  Authorization Expiration Date: 9/18/2019  Medication: METHYLPHENIDATE CR 36MG - APPROVED  Approved Dose/Quantity: DAILY  Reference #: [id number]   Insurance Company: [employer] - Phone [phone] Fax [phone]  Expected CoPay: NA     CoPay Card Available:      Foundation Assistance Needed:    Which Pharmacy is filling the prescription (Not needed for infusion/clinic administered): [id number] [place], [place] - [address] [address]  Pharmacy Notified: Yes  Patient Notified: No    Insurance Name:  [employer]  Insurance ID:  [id number]      Pharmacy Information (if different than what is on RX)  Name:  [facility] [place], [place] - [address] [facility]  Phone:  [phone]      Jose PURVIS

## 2019-10-21 NOTE — TELEPHONE ENCOUNTER
PA Initiation    Medication: methylphenidate (CONCERTA) 36 MG CR tablet - INITIATED  Insurance Company:    Pharmacy Filling the Rx: [id number] - [place], [place] - [address]  Filling Pharmacy Phone: [phone]  Filling Pharmacy Fax:    Start Date: 10/21/2019

## 2019-11-17 ENCOUNTER — APPOINTMENT (OUTPATIENT)
Dept: CT IMAGING | Facility: CLINIC | Age: 29
End: 2019-11-17
Attending: EMERGENCY MEDICINE
Payer: COMMERCIAL

## 2019-11-17 ENCOUNTER — HOSPITAL ENCOUNTER (INPATIENT)
Facility: CLINIC | Age: 29
LOS: 4 days | Discharge: HOME | DRG: 029 | End: 2019-11-21
Attending: STUDENT IN AN ORGANIZED HEALTH CARE EDUCATION/TRAINING PROGRAM | Admitting: INTERNAL MEDICINE
Payer: COMMERCIAL

## 2019-11-17 ENCOUNTER — HOSPITAL ENCOUNTER (EMERGENCY)
Facility: CLINIC | Age: 29
Discharge: ANOTHER HOSPITAL | End: 2019-11-17
Attending: EMERGENCY MEDICINE | Admitting: EMERGENCY MEDICINE
Payer: COMMERCIAL

## 2019-11-17 ENCOUNTER — APPOINTMENT (OUTPATIENT)
Dept: MRI IMAGING | Facility: CLINIC | Age: 29
End: 2019-11-17
Attending: EMERGENCY MEDICINE
Payer: COMMERCIAL

## 2019-11-17 ENCOUNTER — NURSE TRIAGE (OUTPATIENT)
Dept: NURSING | Facility: CLINIC | Age: 29
End: 2019-11-17

## 2019-11-17 VITALS
SYSTOLIC BLOOD PRESSURE: 105 MMHG | DIASTOLIC BLOOD PRESSURE: 75 MMHG | OXYGEN SATURATION: 100 % | HEIGHT: 65 IN | RESPIRATION RATE: 18 BRPM | BODY MASS INDEX: 29.99 KG/M2 | WEIGHT: 180 LBS | HEART RATE: 68 BPM | TEMPERATURE: 98.6 F

## 2019-11-17 DIAGNOSIS — M50.023 CERVICAL DISC DISORDER AT C6-C7 LEVEL WITH MYELOPATHY: ICD-10-CM

## 2019-11-17 DIAGNOSIS — S12.691A OTHER CLOSED NONDISPLACED FRACTURE OF SEVENTH CERVICAL VERTEBRA, INITIAL ENCOUNTER (H): ICD-10-CM

## 2019-11-17 DIAGNOSIS — S14.109A CONTUSION OF CERVICAL CORD, INITIAL ENCOUNTER (H): ICD-10-CM

## 2019-11-17 DIAGNOSIS — Z98.890 S/P CERVICAL DISC REPLACEMENT: Primary | ICD-10-CM

## 2019-11-17 PROBLEM — S12.9XXA CERVICAL SPINE FRACTURE (H): Status: ACTIVE | Noted: 2019-11-17

## 2019-11-17 LAB
ANION GAP SERPL CALCULATED.3IONS-SCNC: 6 MMOL/L (ref 3–14)
BASOPHILS # BLD AUTO: 0 10E9/L (ref 0–0.2)
BASOPHILS NFR BLD AUTO: 0.3 %
BUN SERPL-MCNC: 12 MG/DL (ref 7–30)
CALCIUM SERPL-MCNC: 8.8 MG/DL (ref 8.5–10.1)
CHLORIDE SERPL-SCNC: 106 MMOL/L (ref 94–109)
CO2 SERPL-SCNC: 26 MMOL/L (ref 20–32)
CREAT SERPL-MCNC: 0.87 MG/DL (ref 0.52–1.04)
DIFFERENTIAL METHOD BLD: ABNORMAL
EOSINOPHIL # BLD AUTO: 0.1 10E9/L (ref 0–0.7)
EOSINOPHIL NFR BLD AUTO: 0.4 %
ERYTHROCYTE [DISTWIDTH] IN BLOOD BY AUTOMATED COUNT: 13.1 % (ref 10–15)
GFR SERPL CREATININE-BSD FRML MDRD: 89 ML/MIN/{1.73_M2}
GLUCOSE SERPL-MCNC: 87 MG/DL (ref 70–99)
HCG SERPL QL: NEGATIVE
HCT VFR BLD AUTO: 41.6 % (ref 35–47)
HGB BLD-MCNC: 13.4 G/DL (ref 11.7–15.7)
IMM GRANULOCYTES # BLD: 0.1 10E9/L (ref 0–0.4)
IMM GRANULOCYTES NFR BLD: 0.5 %
LYMPHOCYTES # BLD AUTO: 1.7 10E9/L (ref 0.8–5.3)
LYMPHOCYTES NFR BLD AUTO: 12.8 %
MCH RBC QN AUTO: 30.7 PG (ref 26.5–33)
MCHC RBC AUTO-ENTMCNC: 32.2 G/DL (ref 31.5–36.5)
MCV RBC AUTO: 95 FL (ref 78–100)
MONOCYTES # BLD AUTO: 1.1 10E9/L (ref 0–1.3)
MONOCYTES NFR BLD AUTO: 8.5 %
NEUTROPHILS # BLD AUTO: 10 10E9/L (ref 1.6–8.3)
NEUTROPHILS NFR BLD AUTO: 77.5 %
NRBC # BLD AUTO: 0 10*3/UL
NRBC BLD AUTO-RTO: 0 /100
PLATELET # BLD AUTO: 422 10E9/L (ref 150–450)
POTASSIUM SERPL-SCNC: 3.5 MMOL/L (ref 3.4–5.3)
RBC # BLD AUTO: 4.37 10E12/L (ref 3.8–5.2)
SODIUM SERPL-SCNC: 138 MMOL/L (ref 133–144)
WBC # BLD AUTO: 12.9 10E9/L (ref 4–11)

## 2019-11-17 PROCEDURE — 72125 CT NECK SPINE W/O DYE: CPT

## 2019-11-17 PROCEDURE — 25000128 H RX IP 250 OP 636: Performed by: EMERGENCY MEDICINE

## 2019-11-17 PROCEDURE — 99222 1ST HOSP IP/OBS MODERATE 55: CPT | Mod: AI | Performed by: INTERNAL MEDICINE

## 2019-11-17 PROCEDURE — 80048 BASIC METABOLIC PNL TOTAL CA: CPT | Performed by: EMERGENCY MEDICINE

## 2019-11-17 PROCEDURE — 12000000 ZZH R&B MED SURG/OB

## 2019-11-17 PROCEDURE — 36415 COLL VENOUS BLD VENIPUNCTURE: CPT | Performed by: EMERGENCY MEDICINE

## 2019-11-17 PROCEDURE — 99223 1ST HOSP IP/OBS HIGH 75: CPT | Performed by: PHYSICIAN ASSISTANT

## 2019-11-17 PROCEDURE — 70450 CT HEAD/BRAIN W/O DYE: CPT

## 2019-11-17 PROCEDURE — 96374 THER/PROPH/DIAG INJ IV PUSH: CPT

## 2019-11-17 PROCEDURE — 72141 MRI NECK SPINE W/O DYE: CPT

## 2019-11-17 PROCEDURE — 84703 CHORIONIC GONADOTROPIN ASSAY: CPT | Performed by: EMERGENCY MEDICINE

## 2019-11-17 PROCEDURE — 25000132 ZZH RX MED GY IP 250 OP 250 PS 637: Performed by: EMERGENCY MEDICINE

## 2019-11-17 PROCEDURE — 99285 EMERGENCY DEPT VISIT HI MDM: CPT | Mod: 25

## 2019-11-17 PROCEDURE — 85025 COMPLETE CBC W/AUTO DIFF WBC: CPT | Performed by: EMERGENCY MEDICINE

## 2019-11-17 PROCEDURE — 25000132 ZZH RX MED GY IP 250 OP 250 PS 637: Performed by: PHYSICIAN ASSISTANT

## 2019-11-17 RX ORDER — MORPHINE SULFATE 2 MG/ML
2-4 INJECTION, SOLUTION INTRAMUSCULAR; INTRAVENOUS
Status: DISCONTINUED | OUTPATIENT
Start: 2019-11-17 | End: 2019-11-21 | Stop reason: HOSPADM

## 2019-11-17 RX ORDER — LIDOCAINE 40 MG/G
CREAM TOPICAL
Status: DISCONTINUED | OUTPATIENT
Start: 2019-11-17 | End: 2019-11-21 | Stop reason: HOSPADM

## 2019-11-17 RX ORDER — LEVOTHYROXINE SODIUM 50 UG/1
50 TABLET ORAL DAILY
Status: DISCONTINUED | OUTPATIENT
Start: 2019-11-18 | End: 2019-11-21 | Stop reason: HOSPADM

## 2019-11-17 RX ORDER — BUSPIRONE HYDROCHLORIDE 5 MG/1
5 TABLET ORAL 2 TIMES DAILY
Status: DISCONTINUED | OUTPATIENT
Start: 2019-11-17 | End: 2019-11-19

## 2019-11-17 RX ORDER — OXYCODONE AND ACETAMINOPHEN 5; 325 MG/1; MG/1
1-2 TABLET ORAL EVERY 4 HOURS PRN
Status: DISCONTINUED | OUTPATIENT
Start: 2019-11-17 | End: 2019-11-21 | Stop reason: HOSPADM

## 2019-11-17 RX ORDER — PROPRANOLOL HCL 60 MG
60 CAPSULE, EXTENDED RELEASE 24HR ORAL DAILY
Status: DISCONTINUED | OUTPATIENT
Start: 2019-11-18 | End: 2019-11-21 | Stop reason: HOSPADM

## 2019-11-17 RX ORDER — POLYETHYLENE GLYCOL 3350 17 G/17G
17 POWDER, FOR SOLUTION ORAL DAILY PRN
Status: DISCONTINUED | OUTPATIENT
Start: 2019-11-17 | End: 2019-11-21 | Stop reason: HOSPADM

## 2019-11-17 RX ORDER — OLANZAPINE 5 MG/1
5 TABLET, ORALLY DISINTEGRATING ORAL ONCE
Status: COMPLETED | OUTPATIENT
Start: 2019-11-17 | End: 2019-11-17

## 2019-11-17 RX ORDER — ONDANSETRON 2 MG/ML
4 INJECTION INTRAMUSCULAR; INTRAVENOUS EVERY 6 HOURS PRN
Status: DISCONTINUED | OUTPATIENT
Start: 2019-11-17 | End: 2019-11-21 | Stop reason: HOSPADM

## 2019-11-17 RX ORDER — ONDANSETRON 4 MG/1
4 TABLET, ORALLY DISINTEGRATING ORAL EVERY 6 HOURS PRN
Status: DISCONTINUED | OUTPATIENT
Start: 2019-11-17 | End: 2019-11-21 | Stop reason: HOSPADM

## 2019-11-17 RX ORDER — NALOXONE HYDROCHLORIDE 0.4 MG/ML
.1-.4 INJECTION, SOLUTION INTRAMUSCULAR; INTRAVENOUS; SUBCUTANEOUS
Status: DISCONTINUED | OUTPATIENT
Start: 2019-11-17 | End: 2019-11-21 | Stop reason: HOSPADM

## 2019-11-17 RX ORDER — PAROXETINE 40 MG/1
40 TABLET, FILM COATED ORAL DAILY
Status: DISCONTINUED | OUTPATIENT
Start: 2019-11-18 | End: 2019-11-21 | Stop reason: HOSPADM

## 2019-11-17 RX ORDER — DEXAMETHASONE SODIUM PHOSPHATE 4 MG/ML
4 INJECTION, SOLUTION INTRA-ARTICULAR; INTRALESIONAL; INTRAMUSCULAR; INTRAVENOUS; SOFT TISSUE EVERY 6 HOURS
Status: DISCONTINUED | OUTPATIENT
Start: 2019-11-18 | End: 2019-11-20

## 2019-11-17 RX ORDER — DEXAMETHASONE SODIUM PHOSPHATE 10 MG/ML
10 INJECTION, SOLUTION INTRAMUSCULAR; INTRAVENOUS ONCE
Status: COMPLETED | OUTPATIENT
Start: 2019-11-17 | End: 2019-11-17

## 2019-11-17 RX ORDER — FAMOTIDINE 10 MG
10 TABLET ORAL 2 TIMES DAILY
Status: DISCONTINUED | OUTPATIENT
Start: 2019-11-17 | End: 2019-11-21 | Stop reason: HOSPADM

## 2019-11-17 RX ORDER — GABAPENTIN 300 MG/1
300 CAPSULE ORAL 3 TIMES DAILY
Status: DISCONTINUED | OUTPATIENT
Start: 2019-11-17 | End: 2019-11-21 | Stop reason: HOSPADM

## 2019-11-17 RX ADMIN — GABAPENTIN 300 MG: 300 CAPSULE ORAL at 23:45

## 2019-11-17 RX ADMIN — DEXAMETHASONE SODIUM PHOSPHATE 10 MG: 10 INJECTION, SOLUTION INTRAMUSCULAR; INTRAVENOUS at 19:14

## 2019-11-17 RX ADMIN — OLANZAPINE 5 MG: 5 TABLET, ORALLY DISINTEGRATING ORAL at 17:44

## 2019-11-17 ASSESSMENT — ENCOUNTER SYMPTOMS
WEAKNESS: 1
NECK PAIN: 1
HEADACHES: 0
NAUSEA: 1
NUMBNESS: 1
VOMITING: 1

## 2019-11-17 ASSESSMENT — MIFFLIN-ST. JEOR: SCORE: 1537.35

## 2019-11-17 NOTE — TELEPHONE ENCOUNTER
"Gayathri calling and would like to know where she should go ([facility] or ED). She fell yesterday and hit the top of her head.   Initially after the fall Gayathri reports that she \"couldn't feel her legs.\"   Symptoms have persisted 12 hours later and Gayathri is still experiencing numbness/ tingling in her hands.     Since patient just wanted to know where to go and based upon the few symptoms that she verbalized, FNA advised to be seen in the ED as they would be able to perform further imaging or diagnostic testing. RN advised to call back with any changes, worsening of symptoms, and questions or concerns. Patient verbalized understanding of and agreement with plan and had no further questions.     Jaz Dominguez RN  [facility]   "

## 2019-11-17 NOTE — ED PROVIDER NOTES
"  History     Chief Complaint:  Head Injury     HPI   Gayathri Gomez is a 29 year old female who presents with a head injury. The patient reported that she was arguing with her ex while sitting in bed when she leaned over to grab his phone causing her to fall out of bed approximately 4 feet landing on her head and causing her to pass out for approximately 30 seconds. At first the patient felt fine and she denied any alcohol use at the time. When the patient got up this morning the patient felt nauseous and it has improved over the course of the day. She also developed numbness and paresthesia that starts just distal to her elbow bilaterally that travels down the top of her forearm, the ventral side of her hands and into all of her fingers. At approximately 1630 today the patient got out of bed to go to the bathroom when she began to feel \"fuzzy\" and she threw up. The patient then threw up after eating a peanut butter sandwich and again after getting up to take a shower. The patient reported that she is sore when she is walking so that makes her slower than normal. She states that overall she feels like she got \"hit by a bus\" with neck stiffness and soreness. She has full range of motion of her neck and moving her head does not effect the sensation in her hands.     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    Family History:    Breast Cancer   Anxiety   hypertension     Social History:  Current some day smoker   Occasional alcohol use   Negative for drug use.  Marital Status:  Single [1]       Review of Systems   Gastrointestinal: Positive for nausea and vomiting.   Musculoskeletal: Positive for neck pain.   Neurological: Positive for weakness " "and numbness. Negative for headaches.   All other systems reviewed and are negative.      Physical Exam     Patient Vitals for the past 24 hrs:   BP Temp Temp src Pulse Heart Rate Resp SpO2 Height Weight   11/17/19 1930 105/75 -- -- 68 -- -- -- -- --   11/17/19 1915 125/77 -- -- 79 -- -- 100 % -- --   11/17/19 1638 124/77 98.6  F (37  C) Oral 82 82 18 99 % 1.651 m (5' 5\") 81.6 kg (180 lb)       Physical Exam  Constitutional: Alert, attentive, GCS 15  HENT:    Nose: Nose normal.    Mouth/Throat: Oropharynx is clear, mucous membranes are moist.     Neck: No midline tenderness.    Eyes: EOM are normal, anicteric, conjugate gaze  CV: regular rate and rhythm; no murmurs  Chest: Effort normal and breath sounds clear without wheezing or rales, symmetric bilaterally   GI:  non tender. No distension. No guarding or rebound.    MSK: No LE edema, no tenderness to palpation of BLE.  Neurological: Alert, attentive, 4/5  strength bilaterally. 5/5 elbow flexion, extension, and shoulder adduction. Decreased sensation of hands and forearms.   Skin: Skin is warm and dry.    Emergency Department Course   Imaging:  Radiographic findings were communicated with the patient who voiced understanding of the findings.    CT Head without contrast:   No acute intracranial injury as per radiology.    CT Cervical Spine without contrast:    Acute nondisplaced fracture through the left superior C7 facet, as per radiology.    MR Cervical Spine w/o contrast:   1.  Reversal of normal cervical lordosis with kyphosis at the C6-C7 disc space level.  2.  At the C6-C7 disc space level there is central to right paracentral disc extrusion extending inferiorly leading to moderate canal compromise and mild narrowing of the origins of the neural foramen bilaterally.  3.  Possible early edema within right side of the cervical spinal cord at the C6 level.  4.  Ligamentous structures appear intact with mild edema in right side of the posterior soft tissues " from C6 to T1, as per radiology      Laboratory:  CBC: WBC: 12.9 (H), HGB: 13.4, PLT: 422  BMP: all WNL (Creatinine: 0.87)    HCG: Negative    Interventions:   Zyprexa 5mg PO    Decadron 10mg IV     Emergency Department Course:  Nursing notes and vitals reviewed. () I performed an exam of the patient as documented above.     IV inserted. Medicine administered as documented above. Blood drawn. This was sent to the lab for further testing, results above.    The patient was sent for a head CT and cervical spine CT and MRI while in the emergency department, findings above.     () I consulted with Radiology, regarding the patient's history and presentation here in the emergency department.    () I rechecked the patient and discussed the results of her workup thus far. Aspen collar placed on patient.     () I consulted with MAJOR Betancourt, Neurosurgery, regarding the patient's history and presentation here in the emergency department.    ()  I consulted with Dr. Francois of the hospitalist services at [facility]. They are in agreement to accept the patient for admission.    Findings and plan explained to the Patient who consents to admission. Discussed the patient with Dr. Francois, who will admit the patient to a bed for further monitoring, evaluation, and treatment.      Impression & Plan    Medical Decision Makin-year-old woman with unfortunate history of fall from height of bed landing on her head the evening prior presenting for evaluation of tingling and weakness to her bilateral forearms and hands with associated lateral muscular neck tenderness and mild headache.  No clear report of LOC.  On exam, she has no overt midline neck tenderness, and does have diffuse tenderness of the paraspinal muscle however her exam was notable for decreased bilateral  strength with intact proximal arm strength concerning for central cord syndrome.  MRI with and without contrast was obtained, this does show  disc herniation at C6 level with cord impingement and likely early cord edema concerning for central cord syndrome versus spinal cord contusion this injury with CT imaging revealing C7 facet fracture.  Aspen collar placed. Head CT negative.  MRI results were discussed with surgery team, they agreed with plan for steroids and transfer to [facility] with no immediate plan for emergent decompression.  She otherwise remained neurologically intact, no indication for pressors for map goals at this time.  No other evidence of trauma on exam.  Patient was transferred to [facility] for continued treatment and neurosurgical evaluation.    Diagnosis:    ICD-10-CM    1. Contusion of cervical cord, initial encounter (H) S14.109A HCG QUALitative pregnancy (blood)   2. Other closed nondisplaced fracture of seventh cervical vertebra, initial encounter (H) S12.691A     C7 facet       Disposition:  Transferred to [facility] and admitted to Dr. Francois.   Matt House MD  [employer]  12:42 AM 11/18/19     Scribe Disclosure:  I, Libertad Rogers, am serving as a scribe on 11/17/2019 at 5:11 PM to personally document services performed by Matt House MD based on my observations and the provider's statements to me.     Libertad Rogers  11/17/2019   [facility] EMERGENCY DEPARTMENT       Matt House MD  11/18/19 0042

## 2019-11-17 NOTE — ED TRIAGE NOTES
"Pt presents to ED with c/o head injury last night. Pt reports that she was reaching for her boyfriends phone and fell off the bed and hit her head on the carpet. Per pt she does not believe she had any LOC, but states her ex boyfriend who witnessed the incident stated that she didn't respond to him for about 30 seconds after the incident and she couldn't move her legs. Reports about 30 minutes after the incident she began feeling \"pins and needles from forearms to fingers, making it hard for me to spray on deodorant, open bottles and tear open packages.\" Pt also reports emesis x 2 this morning. C/o pain from neck into shoulder blades. Denies headache.   "

## 2019-11-18 LAB
ANION GAP SERPL CALCULATED.3IONS-SCNC: 6 MMOL/L (ref 3–14)
BUN SERPL-MCNC: 12 MG/DL (ref 7–30)
CALCIUM SERPL-MCNC: 8.9 MG/DL (ref 8.5–10.1)
CHLORIDE SERPL-SCNC: 107 MMOL/L (ref 94–109)
CO2 SERPL-SCNC: 23 MMOL/L (ref 20–32)
CREAT SERPL-MCNC: 0.69 MG/DL (ref 0.52–1.04)
ERYTHROCYTE [DISTWIDTH] IN BLOOD BY AUTOMATED COUNT: 13.3 % (ref 10–15)
GFR SERPL CREATININE-BSD FRML MDRD: >90 ML/MIN/{1.73_M2}
GLUCOSE SERPL-MCNC: 152 MG/DL (ref 70–99)
HCT VFR BLD AUTO: 38.3 % (ref 35–47)
HGB BLD-MCNC: 12.9 G/DL (ref 11.7–15.7)
MCH RBC QN AUTO: 31.2 PG (ref 26.5–33)
MCHC RBC AUTO-ENTMCNC: 33.7 G/DL (ref 31.5–36.5)
MCV RBC AUTO: 93 FL (ref 78–100)
PLATELET # BLD AUTO: 393 10E9/L (ref 150–450)
POTASSIUM SERPL-SCNC: 3.6 MMOL/L (ref 3.4–5.3)
RBC # BLD AUTO: 4.14 10E12/L (ref 3.8–5.2)
SODIUM SERPL-SCNC: 136 MMOL/L (ref 133–144)
WBC # BLD AUTO: 9.3 10E9/L (ref 4–11)

## 2019-11-18 PROCEDURE — 36415 COLL VENOUS BLD VENIPUNCTURE: CPT | Performed by: INTERNAL MEDICINE

## 2019-11-18 PROCEDURE — 99233 SBSQ HOSP IP/OBS HIGH 50: CPT | Performed by: PHYSICIAN ASSISTANT

## 2019-11-18 PROCEDURE — 25000132 ZZH RX MED GY IP 250 OP 250 PS 637: Performed by: PHYSICIAN ASSISTANT

## 2019-11-18 PROCEDURE — 25000128 H RX IP 250 OP 636: Performed by: PHYSICIAN ASSISTANT

## 2019-11-18 PROCEDURE — 85027 COMPLETE CBC AUTOMATED: CPT | Performed by: INTERNAL MEDICINE

## 2019-11-18 PROCEDURE — 12000000 ZZH R&B MED SURG/OB

## 2019-11-18 PROCEDURE — 80048 BASIC METABOLIC PNL TOTAL CA: CPT | Performed by: INTERNAL MEDICINE

## 2019-11-18 PROCEDURE — 25000132 ZZH RX MED GY IP 250 OP 250 PS 637: Performed by: INTERNAL MEDICINE

## 2019-11-18 RX ADMIN — GABAPENTIN 300 MG: 300 CAPSULE ORAL at 20:18

## 2019-11-18 RX ADMIN — GABAPENTIN 300 MG: 300 CAPSULE ORAL at 09:18

## 2019-11-18 RX ADMIN — FAMOTIDINE 10 MG: 10 TABLET, FILM COATED ORAL at 20:18

## 2019-11-18 RX ADMIN — PAROXETINE 40 MG: 40 TABLET, FILM COATED ORAL at 09:17

## 2019-11-18 RX ADMIN — OXYCODONE HYDROCHLORIDE AND ACETAMINOPHEN 1 TABLET: 5; 325 TABLET ORAL at 20:18

## 2019-11-18 RX ADMIN — FAMOTIDINE 10 MG: 10 TABLET, FILM COATED ORAL at 09:18

## 2019-11-18 RX ADMIN — PROPRANOLOL HYDROCHLORIDE 60 MG: 60 CAPSULE, EXTENDED RELEASE ORAL at 09:18

## 2019-11-18 RX ADMIN — DEXAMETHASONE SODIUM PHOSPHATE 4 MG: 4 INJECTION, SOLUTION INTRAMUSCULAR; INTRAVENOUS at 20:20

## 2019-11-18 RX ADMIN — DEXAMETHASONE SODIUM PHOSPHATE 4 MG: 4 INJECTION, SOLUTION INTRAMUSCULAR; INTRAVENOUS at 13:53

## 2019-11-18 RX ADMIN — DEXAMETHASONE SODIUM PHOSPHATE 4 MG: 4 INJECTION, SOLUTION INTRAMUSCULAR; INTRAVENOUS at 01:19

## 2019-11-18 RX ADMIN — GABAPENTIN 300 MG: 300 CAPSULE ORAL at 15:31

## 2019-11-18 RX ADMIN — LEVOTHYROXINE SODIUM 50 MCG: 50 TABLET ORAL at 09:18

## 2019-11-18 RX ADMIN — DEXAMETHASONE SODIUM PHOSPHATE 4 MG: 4 INJECTION, SOLUTION INTRAMUSCULAR; INTRAVENOUS at 07:01

## 2019-11-18 ASSESSMENT — ACTIVITIES OF DAILY LIVING (ADL)
ADLS_ACUITY_SCORE: 10
ADLS_ACUITY_SCORE: 10
ADLS_ACUITY_SCORE: 12
ADLS_ACUITY_SCORE: 10

## 2019-11-18 NOTE — PLAN OF CARE
Patient here with a symptomatic C6-7 disk extrusion with stenosis and C7 facet fracture post fall. A&O. Bilateral upper extremity 4/5 strength with moderate hand grasps and outer arm/hand fingers burning/pins/needles/soreness. Taking IV steroids and gabapentin with great improvement in symptoms overnight both pain and mobility. C collar in place, skin intact. Tolerating regular diet. VSS. Up with SBA, calling appropriately and has friend helping at bedside, no alarm. Pt scoring green on the Aggression Stop Light Tool. Plan for Dr. Thacker to review and decide surgical options, most likely surgery Tuesday or Wednesday. Patient will also see social work for issues surrounding finances and work related to this medical setback.

## 2019-11-18 NOTE — CONSULTS
Consult Date:  11/17/2019      ASSESSMENT AND PLAN:  A  29-year-old female status post fall over 24 hours ago with 30 seconds of paralysis, now with a central cord syndrome with imaging findings revealing a herniated disk at the C6 to C7 level with spinal stenosis.  Acute nondisplaced left superior C7 facet fracture.      PLAN:  From the neurosurgical standpoint, the patient was given 10 mg of Decadron IV at [facility] Emergency Room.  We will continue her on 4 mg IV Decadron every 6 hours.  We will also order Neurontin to help with her neuropathic pain.  A long discussion occurred with the patient regarding surgical and nonsurgical options.  Informed her that we recommend a cervical diskectomy with either an arthroplasty or anterior cervical diskectomy and fusion.  Informed her Dr. Thacker will determine which procedure is best and explain the surgery in detail, including the risks, goals and possible outcomes.  She will remain in the cervical hard collar, which will treat her facet fracture, and we would also like to keep her in the cervical hard collar for her central cord syndrome.  The timing of the surgery will be determined by Dr. Thacker, but likely plan would be to operate Tuesday or Wednesday.  Therefore, it is okay for her to eat at this time.  Every 4-hour neuro checks overnight.  Also, would recommend that the  meet with the patient to discuss social and financial concerns.     TYPE OF VISIT:  The neurosurgical service was consulted to see Mrs. Gomez for central cord syndrome.      HISTORY OF PRESENT ILLNESS:  Mrs. Gomez is a 29-year-old right-handed female with a past medical history of ADHD, anxiety, social phobia and depression and hypothyroidism, who presented as a transfer from [facility] for central cord syndrome.  Mrs. Gomez states that yesterday afternoon she was at her home, which she owns with her boyfriend; however, they are , and they were in an  argument.  They were sitting on their bed, which is approximately 3-4 feet off the floor.  He stated he was going to record their conversation, and when she lunged for him to grab his phone, she missed and went off the side of the bed headfirst, landing on the floor.  She did not lose consciousness, but she did have approximately 30 minutes of upper and lower extremity paralysis.  She recognized that she could not move her arms or legs and asked her boyfriend to move her legs for her, and then shortly after that she regained movement in her arms and legs.  She felt nauseated, and her whole body ached.  She did also notice numbness in her hands.  She went to bed.  When she woke up this morning, she still had the numbness in her hands, which radiated proximally on the medial aspect of her forearm, stopping at the elbow, right side equal to left.  When she tried to open a bottle, she was unable, and she presented to [facility] Emergency Room, where a cervical MRI revealed a cervical disk herniation, and Neurosurgery was consulted, and the patient was transferred to [facility].  Over the course of the day, she states that the paresthesias in her hands has improved some, but the weakness has stayed the same.  She denies any lower extremity symptoms.      PAST MEDICAL HISTORY:  Anxiety, social phobia, ADHD, depression, hypothyroidism.      PAST SURGICAL HISTORY:  Negative.      MEDICATIONS:  Reviewed per the electronic medical record.      ALLERGIES:  SULFA.      SOCIAL HISTORY:  She lives with her boyfriend, but they are .  They own the home together.  She recently was going to get in her own apartment next month.  She works at the [employer] as a [employer].  She also just started a dog grooming business.  She smokes 1 pack of cigarettes a week since age of 17.  Alcohol is social.  She is right-handed.  She denies any family being involved in her care.      PHYSICAL EXAMINATION:   VITAL SIGNS:  Temperature is  98.6, pulse of 79, blood pressure 114/66, and respiratory rate is 18.   GENERAL:  She is awake and alert, in no acute distress, pleasant during the exam.  She is in a [place] cervical hard collar.   NEUROLOGIC:  Testing reveals that she has bilateral hand  and intrinsic weakness, marked at 4-/5, right equal to left bicep and deltoid 5/5, triceps 4/5 bilaterally.  Wrist flexion and extension 5/5.  Sensation is decreased in the medial distal aspect of the arm and all 5 fingers, worse with the fourth and fifth digit bilaterally.  Lower extremity strength and sensation are intact.  She has 3 beats of clonus on the right foot, 2 beats on the left.  She is hyperreflexive with 3+ at the right and left patellar, 3+ at the biceps, brachioradialis and triceps.  Left upper extremity:  2+ bicep, tricep and brachioradialis.  Did not appreciate a Babinski.      IMAGING:  MRI of the cervical spine performed earlier today shows she has a central and right-sided herniated disk at the C6 to C7 level.  It creates moderate spinal stenosis, a mild amount of edema.  No ligamentous injury appreciated.  Head CT negative.  CT of the cervical spine reveals an acute nondisplaced left superior C7 facet fracture.      LABORATORY DATA:  Basic metabolic panel unremarkable.  CBC with platelets:  White count of 12.9.  Pregnancy test negative.      Total time spent with the patient 70 minutes, including 50 minutes of counseling and coordination of care.         NATHANIEL PERALTA MD       As dictated by MAJOR NICKERSON            D: 2019   T: 2019   MT: ELSA      Name:     RAMYA SORIANO   MRN:      [id number]        Account:       [id number]   :      1990           Consult Date:  2019      Document: [id number]

## 2019-11-18 NOTE — PHARMACY-ADMISSION MEDICATION HISTORY
Pharmacy Medication History  Admission medication history interview status for the 11/17/2019  admission is complete. See EPIC admission navigator for prior to admission medications     Medication history sources: Patient and Surescripts  Medication history source reliability: Good  Adherence assessment: Good    Significant changes made to the medication list:  Changed pepcid to prn, deleted buspar (pt not taking)      Additional medication history information:   none    Medication reconciliation completed by provider prior to medication history? Yes    Time spent in this activity: 10 minutes      Prior to Admission medications    Medication Sig Last Dose Taking? Auth Provider   famotidine (PEPCID) 10 MG tablet Take 10 mg by mouth 2 times daily as needed  Past Week at Unknown time Yes Reported, Patient   levothyroxine (SYNTHROID/LEVOTHROID) 50 MCG tablet Take 1 tablet (50 mcg) by mouth daily 11/17/2019 at Unknown time Yes Aaseby-Aguilera, Ramona Ann, PA-C   methylphenidate ER (CONCERTA) 36 MG CR tablet Take 1 tablet (36 mg) by mouth every morning 11/17/2019 at Unknown time Yes Irena Preciado, NP   PARoxetine (PAXIL) 40 MG tablet TAKE 1 TABLET BY MOUTH EVERYDAY AT BEDTIME  Patient taking differently: Take 40 mg by mouth every morning  11/17/2019 at Unknown time Yes Aaseby-Aguilera, Ramona Ann, PA-C   propranolol ER (INDERAL LA) 60 MG 24 hr capsule TAKE ONE CAPSULE BY MOUTH EVERY DAY 11/17/2019 at Unknown time Yes Aaseby-Aguilera, Ramona Ann, PA-C   order for DME SAD light   Irena Preciado, NP

## 2019-11-18 NOTE — PROGRESS NOTES
[facility]    Neurosurgery Progress Note    Date of Service (when I saw the patient): 11/18/2019     Assessment & Plan   Gayathri Gomez is a 29 year old female who was admitted on 11/17/2019. She presented with fall with 30 seconds of paralysis now with a central cord syndrome and imaging revealing a herniated disc at the C6-C7 level with spinal stenosis and acute nondisplaced left superior C7 facet fracture. Today she was seen sitting up in bed with cervical collar on. She reports improvement in neck and arm pain. She reports bilateral numbness equally in forearms. She also notes arm and hand weakness. She denies leg symptoms, bowel/bladder complaints, and foot drop.    Active Problems:    Cervical spine fracture (H)    Assessment: C6-7 herniated disc with spinal stenosis and acute nondisplaced left superior C7 facet fracture    Plan:   -Dr. Thacker to discuss surgical options (arthroplasty vs ACDF)  -Tentatively plan OR Tuesday or Wednesday  -Continue cervical collar      I have discussed the following assessment and plan Dr. Thacker who is in agreement with initial plan and will follow up with further consultation recommendations.    Maria Galindo, CNP  [facility]  [place] [address]    Tel [phone]  Pager [phone]      Interval History   Stable.    Physical Exam   Temp: 98.9  F (37.2  C) Temp src: Oral BP: 113/67 Pulse: 74 Heart Rate: 70 Resp: 16 SpO2: 97 % O2 Device: None (Room air)    There were no vitals filed for this visit.  Vital Signs with Ranges  Temp:  [98.3  F (36.8  C)-99.5  F (37.5  C)] 98.9  F (37.2  C)  Pulse:  [68-82] 74  Heart Rate:  [62-82] 70  Resp:  [16-18] 16  BP: (105-125)/(66-77) 113/67  SpO2:  [97 %-100 %] 97 %  I/O last 3 completed shifts:  In: 480 [P.O.:480]  Out: -     Heart Rate: 70, Blood pressure 113/67, pulse 74, temperature 98.9  F (37.2  C), temperature source Oral, resp. rate  16, SpO2 97 %, not currently breastfeeding.  0 lbs 0 oz  HEENT:  Normocephalic.  PERRLA.    Heart:  No peripheral edema  Lungs:  No SOB  Skin:  Warm and dry, good capillary refill.  Extremities:  Good radial and dorsalis pedis pulses bilaterally, no edema, cyanosis or clubbing.    NEUROLOGICAL EXAMINATION:   Mental status:  Alert and Oriented x 3, speech is fluent.  Cranial nerves:  II-XII intact.   Motor:  Strength is 5/5 throughout the upper and lower extremities except bilateral hand  and intrinsic 4/5 strength.  Sensation:  Decreased bilateral forearms    Medications       busPIRone  5 mg Oral BID     dexamethasone  4 mg Intravenous Q6H     famotidine  10 mg Oral BID     gabapentin  300 mg Oral TID     levothyroxine  50 mcg Oral Daily     PARoxetine  40 mg Oral Daily     propranolol ER  60 mg Oral Daily     sodium chloride (PF)  3 mL Intracatheter Q8H       Data     CBC RESULTS:   Recent Labs   Lab Test 11/18/19  0824   WBC 9.3   RBC 4.14   HGB 12.9   HCT 38.3   MCV 93   MCH 31.2   MCHC 33.7   RDW 13.3        Basic Metabolic Panel:  Lab Results   Component Value Date     11/18/2019      Lab Results   Component Value Date    POTASSIUM 3.6 11/18/2019     Lab Results   Component Value Date    CHLORIDE 107 11/18/2019     Lab Results   Component Value Date    DOUG 8.9 11/18/2019     Lab Results   Component Value Date    CO2 23 11/18/2019     Lab Results   Component Value Date    BUN 12 11/18/2019     Lab Results   Component Value Date    CR 0.69 11/18/2019     Lab Results   Component Value Date     11/18/2019     INR:  No results found for: INR

## 2019-11-18 NOTE — H&P
[facility]    History and Physical - Hospitalist Service       Date of Admission:  11/17/2019    Assessment & Plan   Gayathri Gomez is a 29 year old female with history of anxiety, ADHD, depression and hypothyroidism who had a mechanical fall and C-spine injury, presented to [facility] ED and was transferred to [facility] for finding of central cord syndrome.    C6/C7 herniated disc with spinal stenosis  -After mechanical fall. MRI of the C-spine shows reversal of normal cervical lordosis with kyphosis at the C6-C7 disc space level.  There is also central to right paracentral disc extrusion extending inferiorly leading to moderate canal compromise and mild narrowing of origin of neural foramen bilaterally with possibly early edema with right side of C-spine at C6 level.  Ligamentous structures appear intact with mild edema and right-sided posterior soft tissue from C6-T1  - Neurosurgery was already contacted by [facility].  She received 10 mg IV Decadron, has been continued on 4 mg IV Decadron every 6 hours.  -Neurosurgery following, plan for surgery likely on Tuesday/Wednesday  -Continue to monitor her on hard collar with q.4 neurovascular checks.  -Further care per neurosurgery  -PT OT evaluation and treat  -We will also consult neurology for head concussion    Anxiety  ADHD  Depression  Hypothyroidism  -Resume PTA medication once verified by pharmacy     Diet: Regular Diet Adult    DVT Prophylaxis: Pneumatic Compression Devices  Meier Catheter: not present  Code Status: Full Code      Disposition Plan   Expected discharge: 2 - 3 days, recommended to Prior living arrangements versus TCU pending PT OT evaluation post surgery once Surgery completed and cleared by all consultants.  Entered: Azucena Arceo MD 11/17/2019, 11:16 PM     The patient's care was discussed with the Patient.    Azucena Arceo MD  [facility]  Hospital    ______________________________________________________________________    Chief Complaint   Mechanical fall and neck pain    History is obtained from the patient    History of Present Illness   Gayathri Gomez is a 29 year old female with history of anxiety, ADHD, depression and hypothyroidism who had a mechanical fall and C-spine injury, presented to [facility] ED and was transferred to [facility] for finding of central cord syndrome.    The patient was having an argument with her boyfriend yesterday.  She lunged in the bed to grab his phone during argument when she fell from the bed with headfirst and landing on the floor.  She is unsure if she actually lost consciousness however started noticing that both her upper extremity started tingling.  Initially was not able to move however shortly after that she started moving.  She does have nausea but no vomiting.  This morning when she woke up she continued to have numbness.  So she presented to [facility]    Review of Systems    The 10 point Review of Systems is negative other than noted in the HPI or here.     Past Medical History    I have reviewed this patient's medical history and updated it with pertinent information if needed.   Past Medical History:   Diagnosis Date     ADHD (attention deficit hyperactivity disorder)      Anxiety    Hypothyroidism    Past Surgical History   I have reviewed this patient's surgical history and updated it with pertinent information if needed.  No past surgical history on file.    Social History   I have reviewed this patient's social history and updated it with pertinent information if needed.  Social History     Tobacco Use     Smoking status: Current Some Day Smoker     Years: 5.00     Types: Cigarettes     Smokeless tobacco: Never Used   Substance Use Topics     Alcohol use: Yes     Alcohol/week: 0.0 standard drinks     Comment: occ     Drug use: No       Family History   I have reviewed this  patient's family history and updated it with pertinent information if needed.   Family History   Problem Relation Age of Onset     Breast Cancer Maternal Grandmother      Psychotic Disorder Mother         severe anxiety     Hypertension Father        Prior to Admission Medications   Prior to Admission Medications   Prescriptions Last Dose Informant Patient Reported? Taking?   PARoxetine (PAXIL) 40 MG tablet   No No   Sig: TAKE 1 TABLET BY MOUTH EVERYDAY AT BEDTIME   busPIRone (BUSPAR) 5 MG tablet   No No   Sig: Take 1 tablet (5 mg) by mouth 2 times daily   Patient not taking: Reported on 4/8/2019   famotidine (PEPCID) 10 MG tablet   Yes No   Sig: Take 10 mg by mouth 2 times daily   levothyroxine (SYNTHROID/LEVOTHROID) 50 MCG tablet   No No   Sig: Take 1 tablet (50 mcg) by mouth daily   methylphenidate (CONCERTA) 36 MG CR tablet   No No   Sig: Take 1 tablet (36 mg) by mouth every morning Please fill on or after 10/5/19   methylphenidate ER (CONCERTA) 36 MG CR tablet   No No   Sig: Take 1 tablet (36 mg) by mouth every morning   methylphenidate ER (CONCERTA) 36 MG CR tablet   No No   Sig: Take 1 tablet (36 mg) by mouth every morning   methylphenidate ER (CONCERTA) 36 MG CR tablet   No No   Sig: Take 1 tablet (36 mg) by mouth every morning   order for DME   No No   Sig: SAD light   propranolol ER (INDERAL LA) 60 MG 24 hr capsule   No No   Sig: TAKE ONE CAPSULE BY MOUTH EVERY DAY      Facility-Administered Medications: None     Allergies   Allergies   Allergen Reactions     Sulfa Drugs Hives       Physical Exam   Vital Signs: Temp: 99.5  F (37.5  C) Temp src: Oral BP: 114/66 Pulse: 72   Resp: 18 SpO2: 100 %      Weight: 0 lbs 0 oz    Exam:  Constitutional: Awake, alert and no distress. Appears comfortable  Head/neck: Normocephalic. No masses, lesions, tenderness or abnormalities, c-collar in place  Cardiovascular: RRR.  No murmurs, no rubs or JVD  Respiratory: Normal WOB,b/l equal air entry, no wheezes or crackles    Gastrointestinal: Abdomen soft, non-tender. BS normal. No masses, organomegaly  : Deferred  Extremities : No edema , no clubbing or cyanosis    Neurologic: Cranial nerves II-XII grossly intact , power symmetrical bilaterally, Reflexes normal and symmetric. Sensation grossly WNL.  Skin: Warm and dry to touch no rash      Data   Data reviewed today: I reviewed all medications, new labs and imaging results over the last 24 hours. I personally reviewed the C-spine CT image(s) showing Herniated disc.    Recent Labs   Lab 11/17/19  1741   WBC 12.9*   HGB 13.4   MCV 95         POTASSIUM 3.5   CHLORIDE 106   CO2 26   BUN 12   CR 0.87   ANIONGAP 6   DOUG 8.8   GLC 87     Recent Results (from the past 24 hour(s))   MR Cervical Spine w/o Contrast    Narrative    EXAM: MR CERVICAL SPINE W/O CONTRAST  LOCATION: [facility]  DATE/TIME: 11/17/2019 5:54 PM    INDICATION: Trauma with neck pain.  COMPARISON: None.  TECHNIQUE: Without IV contrast.    FINDINGS:   There is good anatomic alignment of the cervical spine but there is a reversal of the normal cervical lordosis at the C6-C7 disc space level leading to kyphosis. The vertebral body heights are well-maintained throughout and have appropriate signal   characteristics for the patient's age. There is faint high signal seen within the cervical spinal cord on the right side at the C6 vertebral body level on the STIR images. There is no evidence of hemorrhage. There are two nodular masses within the   inferior parotid glands. The right one measures 1.5 cm x 1.3 cm and the left one measures approximately 1.4 cm x 0.6 cm. These could be nodular lesions within the parotid glands versus jugulodigastric lymph nodes. A routine CT scan of the neck with   contrast in the future may be helpful for further delineation. The ligamentous structures appear intact. There is mild edema on the STIR images within the right side of the posterior soft tissues from C6 through  T1 and slightly within the interspinous   process ligaments at the C6-C7 disc space level.    Craniovertebral junction and C1-C2: Normal.    C2-C3: Normal disc height. No herniation. Normal facets. No spinal canal or neural foraminal stenosis.     C3-C4: Normal disc height. No herniation. Normal facets. No spinal canal or neural foraminal stenosis.     C4-C5: There is a slight decrease in disc space height and signal. There is a tiny central disc protrusion but no evidence of canal compromise. The neural foramen are patent bilaterally.     C5-C6: There is a slight loss of disc space height and signal. There is a broad central disc protrusion but no evidence of canal compromise. The neural foramen are patent bilaterally.     C6-C7: There is a moderate loss of disc space height and signal. There is a broad central to right paracentral disc extrusion extending inferiorly. This measures approximately 1.2 cm transversely by 0.6 cm anteriorly posteriorly by 0.7 cm cranial   caudally. This does lead to moderate canal compromise and mild narrowing of the origins of the neural foramen bilaterally. This extrusion may have a free fragment component.     C7-T1: Normal disc height. No herniation. Normal facets. No spinal canal or neural foraminal stenosis.      Impression    IMPRESSION:  1.  Reversal of normal cervical lordosis with kyphosis at the C6-C7 disc space level.  2.  At the C6-C7 disc space level there is central to right paracentral disc extrusion extending inferiorly leading to moderate canal compromise and mild narrowing of the origins of the neural foramen bilaterally.  3.  Possible early edema within right side of the cervical spinal cord at the C6 level.  4.  Ligamentous structures appear intact with mild edema in right side of the posterior soft tissues from C6 to T1.    [Critical Result: C6-C7 disc space level disc extrusion with possible free fragment leading to moderate canal compromise with edema visualized  in the cervical spinal cord.]    Finding was identified on 11/17/2019 6:42 PM.     Dr. House was contacted by Dr. Mckeon on 11/17/2019 7:01 PM and verbalized understanding of the critical result.      Head CT w/o contrast    Narrative    EXAM: CT HEAD W/O CONTRAST  LOCATION: [facility]  DATE/TIME: 11/17/2019 7:46 PM    INDICATION: Trauma from fall.  COMPARISON: None.  TECHNIQUE: Routine without IV contrast. Multiplanar reformats. Dose reduction techniques were used.    FINDINGS:  INTRACRANIAL CONTENTS: No intracranial hemorrhage, extraaxial collection, or mass effect. Pineal cyst. No CT evidence of acute infarct. Normal parenchymal attenuation. Normal ventricles and sulci.     VISUALIZED ORBITS/SINUSES/MASTOIDS: Bilateral drusen noted. No paranasal sinus mucosal disease. No middle ear or mastoid effusion.    BONES/SOFT TISSUES: No acute abnormality.      Impression    IMPRESSION:  1.  No acute intracranial injury.   Cervical spine CT w/o contrast    Narrative    EXAM: CT CERVICAL SPINE W/O CONTRAST  LOCATION: [facility]  DATE/TIME: 11/17/2019 7:46 PM    INDICATION: Neck pain after trauma.  COMPARISON: Cervical spine MRI 11/17/2019  TECHNIQUE: Routine without IV contrast. Multiplanar reformats. Dose reduction techniques were used.    FINDINGS:  Acute nondisplaced oblique fracture through the left superior C7 facet. Reversal of the usual cervical lordosis. Normal vertebral body heights. No subluxation. No prevertebral soft tissue swelling.      Impression    IMPRESSION:  1.  Acute nondisplaced fracture through the left superior C7 facet.    [Critical Result: Acute cervical spine fracture]    Finding was identified on 11/17/2019 8:29 PM.     Dr. House was contacted by me on 11/17/2019 8:42 PM and verbalized understanding of the critical result.

## 2019-11-18 NOTE — CONSULTS
Care Transition Initial Assessment - SW     Met with: Patient    Active Problems:    Cervical spine fracture (H)    fall   DATA  Lives With: other relative(s)(has home with ex boyfriend and has apt)   Pt shares a home with her ex boyfriend. They are both listed on the mortgage.   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  Pt states she is in process of obtaining own apartment  And breaking up with boyfriend. She was in an argument with boyfriend and fell injuring her neck. She left to stay with a friend and came into the hospital next day for treatment.  Pt is disappointed in loss of income to pay for new apartment but indicates that she is checking on short term disability from her [employer] job. She states that her parents are able to assist her some with rent while she recovers.  Pt continues to engage with her boyfriend and will need to resolve the house issue to obtain her share of the home.  Pt is seeing a counselor currently for depression and anxiety. Pt is due for surgery tomorrow. She is anxious about her situation but knows that it will work out in the end.  She has no additional concerns needing SW consult at this time.    Identified issues/concerns regarding health management:     Discussed possibility of needing legal assistance with obtaining her share of the home she shares with the ex boyfriend.  discussed through [facility]   Pt checking into short term disability.  Pt voices frustration with current inability to work. She realizes that she will need safe place to heal.     ASSESSMENT  Cognitive Status:  awake, alert and oriented  Concerns to be addressed:  None at this time. Pt has SW contact info if needs during hospitalization .     PLAN  Financial costs for the patient includes N/A  Patient Goals and Preferences: Discharge to her apartment and return to work asap.  SW will watch for recommendations post  surgery.    MARYSOL Clark  [facility]  [phone]

## 2019-11-18 NOTE — CONSULTS
Consult Date:  11/18/2019      NEUROLOGY CONSULTATION      CHIEF COMPLAINT:  Evaluate for concussion.      HISTORY OF PRESENT ILLNESS:  Gayathri Gomez is 29-year-old lady who is overall healthy.  She had an argument with her boyfriend and was trying to get the phone from him, leaning over his body in bed.  She was sitting at that time.  She states that she fell with her head backwards and hit her head.  She felt that the impact was quite intense.  She thinks she may she had lost consciousness for 30 seconds, and when she woke up, she could not feel anything below her neck and then the feeling came back.  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  She got up and continued to function, but had vomiting.  Her friend came and watched her and the next day she decided to come and see the doctors in the emergency room.  She was seen at the [facility] and was diagnosed with a disc extrusion in the cervical spinal cord at the C6 level causing edema of the spinal cord from the C6 to T1 level.  She was transferred to [facility] for further evaluation by Neurosurgery.      The patient states that as far as her impact on hitting her head, she has no headache, no visual symptoms or dizziness.  Her ears were buzzing yesterday but now hearing is normal.  She says that she still has abnormal sensation in her arms and weakness in her arms.  Her balance is good.  She has no confusion, no disorientation.      She did have a CT scan of her head which is normal.  She has no scalp hematoma.  She has MRI of cervical spine which I personally reviewed, which shows C6-7 disc herniation with compression of spinal cord.      The patient states that she has history of remote concussion as a child.      PAST MEDICAL HISTORY:  Past history prior to admission shows depression, hypothyroidism and ADHD.      HOME MEDICATIONS:     1.  Buspar.   2.  Currently she is on Decadron (dexamethasone).     3.   Paxil.      ALLERGIES:  SULFA.      SOCIAL HISTORY:  The patient is single.  She is a nondrinker, nonsmoker.      REVIEW OF SYSTEMS:  Paresthesias in the arms and weakness, otherwise no additional neurological symptoms.  The reminder of organs and systems review is stable to chronic conditions.        FAMILY HISTORY:  Noncontributory to this presentation.      PHYSICAL EXAMINATION:   VITAL SIGNS:  Blood pressure is 113/67, heart rate 74, respiratory rate 16, temperature 98.7.   GENERAL:  The patient is in no acute distress, frustrated because of health issues.   LUNGS:  Clear to auscultation bilaterally.   CARDIOVASCULAR:  S1, S2 cardiac sounds with no murmurs or bruits.   EXTREMITIES:  Warm.  Pedal pulses are present.  There is no evidence of pedal edema.   NEUROLOGIC:  The patient is awake and alert.  Speech and language functions are clear.  Pupils are equal and reactive to light.  Extraocular eye movements in the full range.  There is no nystagmus.  There is symmetrical face.  There is full strength in both upper and lower extremities.  There are symmetrical reflexes, but lower extremity reflexes are brisk.  Clonus is present and bilateral cross adductor reflexes are present.  Toes are equivocal.  In the upper extremities, there is decreased strength to the  in both hands but overall no drift is seen.  At this point, sensory exam intact sensation to light touch, pinprick, vibration and joint position sensation.  Coordination is intact for finger-nose-finger and heel-to-shin.  Gait is not checked.      IMPRESSION AND RECOMMENDATIONS:  This lady presented with a fall with brief loss of consciousness.  By the mechanism of injury, she sustained concussion with brief loss of consciousness.  She appears to be asymptomatic from the standpoint of concussion.      Due to the same fall, she sustained an injury to the cervical spinal cord with disc herniation with evidence of spinal cord compression.  This was assessed  by Neurosurgery and she will have surgery likely tomorrow.      I educated the patient with concussion issues.  At this point, she does not require any neurological management.  I discussed with her that upcoming surgery, pain management and rehabilitation may interfere with symptoms of head injury and it remains to be seen whether or not patient will develop any symptoms.  If she is symptomatic with visual, cognitive, speech, balance or sensory deficits, she certainly could give me a call and set up followup in Neurology Clinic.  At this point, no additional neurological intervention is needed from the standpoint of head injury.  Prognosis for cervical spine injury is undetermined at this point.  I discussed all of the above with the patient.  Neurology will not plan to actively follow up on this admission unless indicated if any additional evaluations are needed.  Otherwise, patient is instructed to contact the Neurology office for followup if she is symptomatic from a neurological standpoint.         YFN HARDEN MD             D: 2019   T: 2019   MT: [id number]      Name:     RAMYA SORIANO   MRN:      [id number]        Account:       [id number]   :      1990           Consult Date:  2019      Document: [id number]

## 2019-11-18 NOTE — CONSULTS
Ms. Gomez is a 29-year-old woman admitted through the emergency room yesterday at [facility] after a fall at her home with subsequent quadriparesis which was short-lived followed by symptoms consistent with central cord syndrome.  Upon initial evaluation cervical MRI scan showed an area of high-grade spinal stenosis at C6-7 with what appears to be a subacute disc herniation at that level protruding into the canal and compressing the ventral aspect of the cervical spinal cord.  There is associated increased T2 signal within the spinal cord indicating an area of injury.  For full details of initial neurosurgical consultation during the night, please refer to Ms. Yudy Betancourt separate dictation.    Ms. Gomez was admitted, started on steroids and is been under observation since the time of admission.  She is currently comfortable and notes improvement in sensation and strength in her upper extremities although she still has some loss of dexterity of her hands as well as diminished intrinsic strength.  This would be rated in her hands bilaterally at 4/5.  Proximal musculature appears to be nearly normal.  This is a significant improvement in the past 20 hours.    I reviewed the clinical and radiographic findings with Ms. Patel at the bedside this evening.  I spoke with her regarding management alternatives including both surgical and nonsurgical means of management.  Given the circumstances I have strongly recommended proceeding with prophylactic decompression at the C6-7 level followed by cervical arthroplasty.  I do not believe there is ongoing compression of the spinal cord given her radiographic findings and her clinical improvement and I explained to her that the indication for surgery was to prevent a repeat injury in the future should she again suffer a fall or have an accident.  I explained to her that the sum of the parts with a second injury would likely be a devastating neurologic deficit.  I  went over the details of the proposed surgery at length with her today.  I explained that the risks of surgery include injury to cervical structures including but not limited to the trachea, esophagus, great vessels or spinal cord with potential complications up to and including infection, bleeding, stroke or quadriplegia.  I spoke with her regarding the decision to place a cervical arthroplasty rather than cervical fusion given her age.  I told her that risks include failure of the arthroplasty, heterotopic ossification, need for revision, etc.  At the conclusion of our discussion, she appeared to have a good understanding of the situation, management alternatives and risks and these recommendations.  She wishes to proceed with surgery as soon as practical.  I told her repeatedly that the aim of surgery was to prevent additional injury and that the surgery itself would not result in immediate improvement in her upper extremity function.  She appeared to understand this point as well.    Approximately 35 minutes was spent in direct contact at the bedside with Ms. Patel, the majority counseling her regarding the current situation, management options and risks.

## 2019-11-18 NOTE — PROVIDER NOTIFICATION
MD Notification    Notified Person: MD    Notified Person Name: Dr Arceo    Notification Date/Time: 11/17/2019 11:20 PM     Notification Interaction:paged    Purpose of Notification: neurosurgery did not want full neuro checks. Do you want those for yourself? Thanks     Orders Received: MD does not want full neuro checks only CMS/neurovascular will correct order    Comments:

## 2019-11-18 NOTE — PLAN OF CARE
A&Ox4. VSS. Complaints of numbness/tingling/burning in hands, though improved from yesterday per patient report. Strength in RUE/LUE 4/5.  CMS in lower extremities intact.  Regular diet, thin liquids. Takes pills with water. Up with SBA. Cervical collar in place at all times. Patient very emotional, tearful about the events leading up to her hospitalization, encouraged to seek support from friends/family and to check in with her counselor. Pt scoring green on the Aggression Stop Light Tool. Plan surgery likely tomorrow or Wednesday. Discharge TBD.

## 2019-11-18 NOTE — PROGRESS NOTES
[facility]    Medicine Progress Note - Hospitalist Service       Date of Admission:  11/17/2019    Assessment & Plan   Gayathri Gomez is a 29 year old female with history of anxiety, ADHD, depression and hypothyroidism who had a mechanical fall and C-spine injury, presented to [facility] ED and was transferred to [facility] for finding of central cord syndrome.    Status-post mechanical fall   Closed head injury with LOC  C7 superior facet fracture, left  Central cord syndrome  C6/C7 herniated disc  Pt presented 24h following mechanical fall with closed head injury and initial 30 seconds of paralysis and residual bilateral upper extremity weakness and numbness. MRI Cspine indicating C6-C7 disc extrusion with moderate canal compromise and narrowing of bilateral neural foramina with possible early cord edema at C6. Ligamentous structures appeared intact. CT Cspine indicated acute nondisplaced fx through left C7 facet. CT head negative.   - Neurosurgery consulted & following, pt is s/p placement of Aspen collar, tentatively planning OR for microdiscectomy on 11/19 vs 11/20.  - Maintain cervical collar at all time  - Continues on IV Decadron 4mg q6h per neurosurgery  - PT OT evaluation and treat  - Neurology consulted for concussion eval    Anxiety  ADHD  Depression  Hypothyroidism  - Resume PTA medication   Diet: Regular Diet Adult    DVT Prophylaxis: Pneumatic Compression Devices  Meier Catheter: not present  Code Status: Full Code      Disposition Plan   Expected discharge: to be determined pending clinical course.  Entered: Jason Robles PA-C 11/18/2019, 10:36 AM       The patient's care was discussed with the Attending Physician, Dr. Ngo, Bedside Nurse, Patient and Patient's Family.    Jason Robles PA-C  Hospitalist Service  [facility]    ______________________________________________________________________    Interval History   Pt seen & evaluated. Sitting up in  bed, continuing to c/o numbness and muscle weakness of bilateral hands. Notes initial full body paralysis lasting 30 seconds with improvement of lower extremities. Aware of plans for OR this week, date tbd.     Data reviewed today: I reviewed all medications, new labs and imaging results over the last 24 hours. I personally reviewed no images or EKG's today.    Physical Exam   Vital Signs: Temp: 98.8  F (37.1  C) Temp src: Oral BP: 125/71 Pulse: 72 Heart Rate: 70 Resp: 16 SpO2: 97 % O2 Device: None (Room air)    Weight: 0 lbs 0 oz  GEN: well-developed, well-nourished, appears comfortable  HEENT: NCAT, PERRL, EOM intact bilaterally, sclera clear, conjunctiva normal, nose & mouth patent, mucous membranes moist  NECK: aspen collar in place, trachea midline  CHEST: lungs CTA bilaterally, no increased work of breathing, no wheeze, rales, rhonchi  HEART: RRR, S1 & S2, no murmur  ABD: soft, nontender, nondistended, no guarding or rigidity, +BS in all 4 quadrants  MSK: full AROM bilateral UE/LE, pedal & radial pulses 2+ bilaterally  NEURO: awake, alert, oriented to name, place, and time. CN II-XII grossly intact. strength 3/5 bilaterally, plantar flexion/ dorsiflexion 5/5 bilaterally, sensation diminished to light touch in bilateral hands  SKIN: warm & dry without rash, no pedal edema    Data   Recent Labs   Lab 11/18/19  0824 11/17/19  1741   WBC 9.3 12.9*   HGB 12.9 13.4   MCV 93 95    422    138   POTASSIUM 3.6 3.5   CHLORIDE 107 106   CO2 23 26   BUN 12 12   CR 0.69 0.87   ANIONGAP 6 6   DOUG 8.9 8.8   * 87     Recent Results (from the past 24 hour(s))   MR Cervical Spine w/o Contrast    Narrative    EXAM: MR CERVICAL SPINE W/O CONTRAST  LOCATION: [facility]  DATE/TIME: 11/17/2019 5:54 PM    INDICATION: Trauma with neck pain.  COMPARISON: None.  TECHNIQUE: Without IV contrast.    FINDINGS:   There is good anatomic alignment of the cervical spine but there is a reversal of the normal  cervical lordosis at the C6-C7 disc space level leading to kyphosis. The vertebral body heights are well-maintained throughout and have appropriate signal   characteristics for the patient's age. There is faint high signal seen within the cervical spinal cord on the right side at the C6 vertebral body level on the STIR images. There is no evidence of hemorrhage. There are two nodular masses within the   inferior parotid glands. The right one measures 1.5 cm x 1.3 cm and the left one measures approximately 1.4 cm x 0.6 cm. These could be nodular lesions within the parotid glands versus jugulodigastric lymph nodes. A routine CT scan of the neck with   contrast in the future may be helpful for further delineation. The ligamentous structures appear intact. There is mild edema on the STIR images within the right side of the posterior soft tissues from C6 through T1 and slightly within the interspinous   process ligaments at the C6-C7 disc space level.    Craniovertebral junction and C1-C2: Normal.    C2-C3: Normal disc height. No herniation. Normal facets. No spinal canal or neural foraminal stenosis.     C3-C4: Normal disc height. No herniation. Normal facets. No spinal canal or neural foraminal stenosis.     C4-C5: There is a slight decrease in disc space height and signal. There is a tiny central disc protrusion but no evidence of canal compromise. The neural foramen are patent bilaterally.     C5-C6: There is a slight loss of disc space height and signal. There is a broad central disc protrusion but no evidence of canal compromise. The neural foramen are patent bilaterally.     C6-C7: There is a moderate loss of disc space height and signal. There is a broad central to right paracentral disc extrusion extending inferiorly. This measures approximately 1.2 cm transversely by 0.6 cm anteriorly posteriorly by 0.7 cm cranial   caudally. This does lead to moderate canal compromise and mild narrowing of the origins of the  neural foramen bilaterally. This extrusion may have a free fragment component.     C7-T1: Normal disc height. No herniation. Normal facets. No spinal canal or neural foraminal stenosis.      Impression    IMPRESSION:  1.  Reversal of normal cervical lordosis with kyphosis at the C6-C7 disc space level.  2.  At the C6-C7 disc space level there is central to right paracentral disc extrusion extending inferiorly leading to moderate canal compromise and mild narrowing of the origins of the neural foramen bilaterally.  3.  Possible early edema within right side of the cervical spinal cord at the C6 level.  4.  Ligamentous structures appear intact with mild edema in right side of the posterior soft tissues from C6 to T1.    [Critical Result: C6-C7 disc space level disc extrusion with possible free fragment leading to moderate canal compromise with edema visualized in the cervical spinal cord.]    Finding was identified on 11/17/2019 6:42 PM.     Dr. House was contacted by Dr. Mckeon on 11/17/2019 7:01 PM and verbalized understanding of the critical result.      Head CT w/o contrast    Narrative    EXAM: CT HEAD W/O CONTRAST  LOCATION: [facility]  DATE/TIME: 11/17/2019 7:46 PM    INDICATION: Trauma from fall.  COMPARISON: None.  TECHNIQUE: Routine without IV contrast. Multiplanar reformats. Dose reduction techniques were used.    FINDINGS:  INTRACRANIAL CONTENTS: No intracranial hemorrhage, extraaxial collection, or mass effect. Pineal cyst. No CT evidence of acute infarct. Normal parenchymal attenuation. Normal ventricles and sulci.     VISUALIZED ORBITS/SINUSES/MASTOIDS: Bilateral drusen noted. No paranasal sinus mucosal disease. No middle ear or mastoid effusion.    BONES/SOFT TISSUES: No acute abnormality.      Impression    IMPRESSION:  1.  No acute intracranial injury.   Cervical spine CT w/o contrast    Narrative    EXAM: CT CERVICAL SPINE W/O CONTRAST  LOCATION: [facility]  DATE/TIME: 11/17/2019 7:46 PM    INDICATION: Neck pain after trauma.  COMPARISON: Cervical spine MRI 11/17/2019  TECHNIQUE: Routine without IV contrast. Multiplanar reformats. Dose reduction techniques were used.    FINDINGS:  Acute nondisplaced oblique fracture through the left superior C7 facet. Reversal of the usual cervical lordosis. Normal vertebral body heights. No subluxation. No prevertebral soft tissue swelling.      Impression    IMPRESSION:  1.  Acute nondisplaced fracture through the left superior C7 facet.    [Critical Result: Acute cervical spine fracture]    Finding was identified on 11/17/2019 8:29 PM.     Dr. House was contacted by me on 11/17/2019 8:42 PM and verbalized understanding of the critical result.              Medications       busPIRone  5 mg Oral BID     dexamethasone  4 mg Intravenous Q6H     famotidine  10 mg Oral BID     gabapentin  300 mg Oral TID     levothyroxine  50 mcg Oral Daily     PARoxetine  40 mg Oral Daily     propranolol ER  60 mg Oral Daily     sodium chloride (PF)  3 mL Intracatheter Q8H

## 2019-11-19 ENCOUNTER — PREP FOR PROCEDURE (OUTPATIENT)
Dept: SURGERY | Facility: CLINIC | Age: 29
End: 2019-11-19

## 2019-11-19 DIAGNOSIS — M50.223 HERNIATED NUCLEUS PULPOSUS, C6-7: Primary | ICD-10-CM

## 2019-11-19 DIAGNOSIS — M50.023 CERVICAL DISC DISORDER AT C6-C7 LEVEL WITH MYELOPATHY: Primary | ICD-10-CM

## 2019-11-19 PROCEDURE — 25000128 H RX IP 250 OP 636: Performed by: PHYSICIAN ASSISTANT

## 2019-11-19 PROCEDURE — 99232 SBSQ HOSP IP/OBS MODERATE 35: CPT | Performed by: PHYSICIAN ASSISTANT

## 2019-11-19 PROCEDURE — 25000132 ZZH RX MED GY IP 250 OP 250 PS 637: Performed by: INTERNAL MEDICINE

## 2019-11-19 PROCEDURE — 25000132 ZZH RX MED GY IP 250 OP 250 PS 637: Performed by: PHYSICIAN ASSISTANT

## 2019-11-19 PROCEDURE — 12000000 ZZH R&B MED SURG/OB

## 2019-11-19 RX ORDER — DEXAMETHASONE SODIUM PHOSPHATE 10 MG/ML
4 INJECTION, SOLUTION INTRAMUSCULAR; INTRAVENOUS ONCE
Status: CANCELLED | OUTPATIENT
Start: 2019-11-19 | End: 2019-11-19

## 2019-11-19 RX ADMIN — DEXAMETHASONE SODIUM PHOSPHATE 4 MG: 4 INJECTION, SOLUTION INTRAMUSCULAR; INTRAVENOUS at 03:01

## 2019-11-19 RX ADMIN — LEVOTHYROXINE SODIUM 50 MCG: 50 TABLET ORAL at 08:52

## 2019-11-19 RX ADMIN — GABAPENTIN 300 MG: 300 CAPSULE ORAL at 15:49

## 2019-11-19 RX ADMIN — DEXAMETHASONE SODIUM PHOSPHATE 4 MG: 4 INJECTION, SOLUTION INTRAMUSCULAR; INTRAVENOUS at 14:40

## 2019-11-19 RX ADMIN — PAROXETINE 40 MG: 40 TABLET, FILM COATED ORAL at 08:52

## 2019-11-19 RX ADMIN — FAMOTIDINE 10 MG: 10 TABLET, FILM COATED ORAL at 22:08

## 2019-11-19 RX ADMIN — GABAPENTIN 300 MG: 300 CAPSULE ORAL at 22:08

## 2019-11-19 RX ADMIN — FAMOTIDINE 10 MG: 10 TABLET, FILM COATED ORAL at 08:52

## 2019-11-19 RX ADMIN — GABAPENTIN 300 MG: 300 CAPSULE ORAL at 08:52

## 2019-11-19 RX ADMIN — DEXAMETHASONE SODIUM PHOSPHATE 4 MG: 4 INJECTION, SOLUTION INTRAMUSCULAR; INTRAVENOUS at 22:08

## 2019-11-19 RX ADMIN — PROPRANOLOL HYDROCHLORIDE 60 MG: 60 CAPSULE, EXTENDED RELEASE ORAL at 08:52

## 2019-11-19 RX ADMIN — DEXAMETHASONE SODIUM PHOSPHATE 4 MG: 4 INJECTION, SOLUTION INTRAMUSCULAR; INTRAVENOUS at 08:52

## 2019-11-19 RX ADMIN — OXYCODONE HYDROCHLORIDE AND ACETAMINOPHEN 2 TABLET: 5; 325 TABLET ORAL at 22:07

## 2019-11-19 ASSESSMENT — ACTIVITIES OF DAILY LIVING (ADL)
ADLS_ACUITY_SCORE: 10

## 2019-11-19 NOTE — PROGRESS NOTES
[facility]    Neurosurgery  Daily Post-Op Note    Assessment & Plan   Procedure(s):  She presented with fall with 30 seconds of paralysis now with a central cord syndrome and imaging revealing a herniated disc at the C6-C7 level with spinal stenosis and acute nondisplaced left superior C7 facet fracture.   States the numbness and tingling in her hands feels better today, as well as improved strength.  Anticipating surgery with Dr. Thacker tomorrow, C6-7 decompression and arthroplasty.          Sanju Putnam    Interval History   Stable.     Physical Exam   Temp: 98.6  F (37  C) Temp src: Oral BP: 121/69 Pulse: 66 Heart Rate: 74 Resp: 14 SpO2: 97 % O2 Device: None (Room air)    There were no vitals filed for this visit.  Vital Signs with Ranges  Temp:  [98  F (36.7  C)-99.7  F (37.6  C)] 98.6  F (37  C)  Pulse:  [66-85] 66  Heart Rate:  [62-78] 74  Resp:  [14-16] 14  BP: (100-122)/(58-75) 121/69  SpO2:  [94 %-99 %] 97 %  No intake/output data recorded.    Alert and oriented.  Moves all extremities equally.  Aspen collar in place    Medications        dexamethasone  4 mg Intravenous Q6H     famotidine  10 mg Oral BID     gabapentin  300 mg Oral TID     levothyroxine  50 mcg Oral Daily     PARoxetine  40 mg Oral Daily     propranolol ER  60 mg Oral Daily     sodium chloride (PF)  3 mL Intracatheter Q8H       Data         Sanju Putnam PA-C  [facility] Neurosurgery  [address] [address]    Tel [phone]  Pager [phone]

## 2019-11-19 NOTE — PLAN OF CARE
A&Ox4. VSS. Numbness/tingling/burning in hands and arms. Strength in RUE/LUE 4/5. CMS in lower extremities intact. Regular diet well tolerated. Up with SBA. Cervical collar in place at all times. Patient tearful about the events leading up to her hospitalization, encouraged to seek support from friends/family and to check in with her counselor. Plan for surgery on Wednesday. Discharge TBD.

## 2019-11-19 NOTE — PROGRESS NOTES
SUBJECTIVE:   CC: Gayathri Gomez is an 28 year old woman who presents for preventive health visit.     Healthy Habits:     Getting at least 3 servings of Calcium per day:  NO    Bi-annual eye exam:  NO    Dental care twice a year:  Yes    Sleep apnea or symptoms of sleep apnea:  None    Diet:  Regular (no restrictions)    Frequency of exercise:  2-3 days/week    Duration of exercise:  30-45 minutes    Taking medications regularly:  Yes    Medication side effects:  None    PHQ-2 Total Score: 1    Additional concerns today:  No          Has ADHD and needs refills on medication.  Doesn't take daily but works well with little side effects.     Today's PHQ-2 Score:   PHQ-2 ( 1999 [employer]) 4/8/2019   Q1: Little interest or pleasure in doing things 0   Q2: Feeling down, depressed or hopeless 1   PHQ-2 Score 1   Q1: Little interest or pleasure in doing things Not at all   Q2: Feeling down, depressed or hopeless Several days   PHQ-2 Score 1       Abuse: Current or Past(Physical, Sexual or Emotional)- No  Do you feel safe in your environment? YES    Social History     Tobacco Use     Smoking status: Current Some Day Smoker     Years: 5.00     Types: Cigarettes     Smokeless tobacco: Never Used   Substance Use Topics     Alcohol use: Yes     Alcohol/week: 0.0 oz     Comment: occ         Alcohol Use 4/8/2019   Prescreen: >3 drinks/day or >7 drinks/week? No   Prescreen: >3 drinks/day or >7 drinks/week? -       Reviewed orders with patient.  Reviewed health maintenance and updated orders accordingly - Yes  BP Readings from Last 3 Encounters:   04/08/19 128/80   08/13/18 128/83   04/16/18 118/80    Wt Readings from Last 3 Encounters:   04/16/18 87.1 kg (192 lb)   12/13/17 87.1 kg (192 lb)   10/23/17 87.1 kg (192 lb)                  Recent Labs   Lab Test 06/26/17  0916 03/20/17  1404   *  --    HDL 68  --    TRIG 131  --    TSH  --  2.59        Mammogram not appropriate for this patient based on age.    Pertinent  Patient notified by phone.  ZEUS BensonN, RN     mammograms are reviewed under the imaging tab.  History of abnormal Pap smear: NO - age 21-29 PAP every 3 years recommended  PAP / HPV 5/19/2016 2/5/2013   PAP NIL NIL     Reviewed and updated as needed this visit by clinical staff  Tobacco  Allergies  Meds  Med Hx  Surg Hx  Fam Hx  Soc Hx        Reviewed and updated as needed this visit by Provider            Review of Systems   Constitutional: Negative for chills and fever.   HENT: Negative for congestion, ear pain, hearing loss and sore throat.    Eyes: Negative for pain and visual disturbance.   Respiratory: Negative for cough and shortness of breath.    Cardiovascular: Negative for chest pain, palpitations and peripheral edema.   Gastrointestinal: Positive for heartburn. Negative for abdominal pain, constipation, diarrhea, hematochezia and nausea.   Breasts:  Negative for tenderness, breast mass and discharge.   Genitourinary: Positive for vaginal discharge. Negative for dysuria, frequency, genital sores, hematuria, pelvic pain, urgency and vaginal bleeding.   Musculoskeletal: Negative for arthralgias, joint swelling and myalgias.   Skin: Negative for rash.   Neurological: Positive for headaches. Negative for dizziness, weakness and paresthesias.   Psychiatric/Behavioral: Positive for mood changes. The patient is nervous/anxious.      CONSTITUTIONAL: NEGATIVE for fever, chills, change in weight  INTEGUMENTARU/SKIN: NEGATIVE for worrisome rashes, moles or lesions  EYES: NEGATIVE for vision changes or irritation  ENT: NEGATIVE for ear, mouth and throat problems  RESP: NEGATIVE for significant cough or SOB  BREAST: NEGATIVE for masses, tenderness or discharge  CV: NEGATIVE for chest pain, palpitations or peripheral edema  GI: NEGATIVE for nausea, abdominal pain, heartburn, or change in bowel habits  : NEGATIVE for unusual urinary or vaginal symptoms. Periods are regular.  MUSCULOSKELETAL: NEGATIVE for significant arthralgias or myalgia  NEURO: NEGATIVE for  "weakness, dizziness or paresthesias  PSYCHIATRIC: NEGATIVE for changes in mood or affect     OBJECTIVE:   /80 (BP Location: Right arm, Patient Position: Chair, Cuff Size: Adult Large)   Pulse 75   Temp 98.8  F (37.1  C) (Oral)   Resp 16   Ht 1.676 m (5' 6\")   LMP 04/04/2019 (Approximate)   SpO2 100%   Breastfeeding? No   BMI 30.99 kg/m    Physical Exam  GENERAL: healthy, alert and no distress  EYES: Eyes grossly normal to inspection, PERRL and conjunctivae and sclerae normal  HENT: ear canals and TM's normal, nose and mouth without ulcers or lesions  NECK: no adenopathy, no asymmetry, masses, or scars and thyroid normal to palpation  RESP: lungs clear to auscultation - no rales, rhonchi or wheezes  BREAST: normal without masses, tenderness or nipple discharge and no palpable axillary masses or adenopathy  CV: regular rate and rhythm, normal S1 S2, no S3 or S4, no murmur, click or rub, no peripheral edema and peripheral pulses strong  ABDOMEN: soft, nontender, no hepatosplenomegaly, no masses and bowel sounds normal  MS: no gross musculoskeletal defects noted, no edema  SKIN: no suspicious lesions or rashes  NEURO: Normal strength and tone, mentation intact and speech normal  PSYCH: mentation appears normal, affect normal/bright    Diagnostic Test Results:  none     ASSESSMENT/PLAN:   1. Screening for malignant neoplasm of cervix    - Pap imaged thin layer screen reflex to HPV if ASCUS - recommend age 25 - 29    2. Screening for HIV (human immunodeficiency virus)    - HIV Screening    3. Need for prophylactic vaccination with tetanus-diphtheria (Td)      4. Routine general medical examination at a health care facility      5. Screening for blood disease    - CBC with platelets    6. Screening for diabetes mellitus    - Comprehensive metabolic panel    7. Screening, lipid    - Lipid panel reflex to direct LDL Fasting    COUNSELING:  Reviewed preventive health counseling, as reflected in patient " "instructions       Regular exercise       Healthy diet/nutrition       Vision screening       Hearing screening       Contraception    BP Readings from Last 1 Encounters:   04/08/19 128/80     Estimated body mass index is 30.99 kg/m  as calculated from the following:    Height as of this encounter: 1.676 m (5' 6\").    Weight as of 4/16/18: 87.1 kg (192 lb).           reports that she has been smoking cigarettes.  She has smoked for the past 5.00 years. She has never used smokeless tobacco.      Counseling Resources:  ATP IV Guidelines  Pooled Cohorts Equation Calculator  Breast Cancer Risk Calculator  FRAX Risk Assessment  ICSI Preventive Guidelines  Dietary Guidelines for Americans, 2010  USDA's MyPlate  ASA Prophylaxis  Lung CA Screening    Ramona Ann Aaseby-Aguilera, PA-C  [facility]  "

## 2019-11-19 NOTE — PLAN OF CARE
Pt here with cervical spine fracture due to fall. A&Ox4. VSS. C/o of headache in the starting of shift managed with one percocet.Complaints of numbness/tingling/burning in hands,but improving per patient. Strength in RUE/LUE 4/5.  Regular diet, thin liquids. Takes pills with water. Up with SBA voided adequatley. Cervical collar in place at all times. Patient very emotional, tearful about the events leading up her to hospital,  Emotional support given .Boy friend at bed side supportive.Pt scoring green on aggression stop light tool.Plan to have surgery on wednesday

## 2019-11-19 NOTE — PLAN OF CARE
PT: PT/OT orders received, chart reviewed, discussed status with RN. Pt admitted after a fall with a herniated disc at the C6-C7 level with spinal stenosis and acute nondisplaced left superior C7 facet fracture. Plan for surgery tomorrow. Is up ambulating with SBA of nursing staff. Will hold PT/OT eval until after surgery.

## 2019-11-19 NOTE — PROGRESS NOTES
[facility]    Medicine Progress Note - Hospitalist Service       Date of Admission:  11/17/2019    Assessment & Plan   Gayathri Gomez is a 29 year old female with history of anxiety, ADHD, depression and hypothyroidism who had a mechanical fall and C-spine injury, presented to [facility] ED and was transferred to [facility] for finding of central cord syndrome.    Status-post mechanical fall   Closed head injury with LOC  C7 superior facet fracture, left  Central cord syndrome  C6/C7 herniated disc  Pt presented 24h following mechanical fall with closed head injury and initial 30 seconds of paralysis and residual bilateral upper extremity weakness and numbness. MRI Cspine indicating C6-C7 disc extrusion with moderate canal compromise and narrowing of bilateral neural foramina with possible early cord edema at C6. Ligamentous structures appeared intact. CT Cspine indicated acute nondisplaced fx through left C7 facet. CT head negative.   - Neurosurgery consulted & following, pt is s/p placement of Aspen collar, OR for microdiscectomy in AM  - Maintain cervical collar at all times  - Continues on IV Decadron 4mg q6h per neurosurgery  - PT/OT evaluation and treat  - Neurology consulted for concussion eval, follow-up if symptoms present, no further intervention warranted  - NPO p MN    Anxiety  ADHD  Depression  Hypothyroidism  - Resume PTA medication        Diet: Regular Diet Adult  NPO per Anesthesia Guidelines for Procedure/Surgery Except for: Meds    DVT Prophylaxis: Pneumatic Compression Devices and Ambulate every shift  Meier Catheter: not present  Code Status: Full Code      Disposition Plan   Expected discharge: 2-3 days, recommended to prior living arrangement once adequate pain management/ tolerating PO medications and safe disposition plan/ TCU bed available.  Entered: Jason Robles PA-C 11/19/2019, 1:08 PM       The patient's care was discussed with the Attending Physician,   Huber, Bedside Nurse, Care Coordinator/ and Patient.    Jason Robles PA-C  Hospitalist Service  [facility]    ______________________________________________________________________    Interval History   Pt seen & evaluated. Frustrated that she cannot perform fine motor tasks such as opening pop bottle. Feels general improvement in  strength bilaterally. Denies pain. Discussed at length the nature of spinal cord trauma and case by case basis of functional return. Pt remains hopeful she will regain full function, desires to work with therapies. Worried about surgery in AM, hopeful it will not cause farther harm or disability.    Data reviewed today: I reviewed all medications, new labs and imaging results over the last 24 hours. I personally reviewed no images or EKG's today.    Physical Exam   Vital Signs: Temp: 98.6  F (37  C) Temp src: Oral BP: 121/69 Pulse: 66 Heart Rate: 74 Resp: 14 SpO2: 97 % O2 Device: None (Room air)    Weight: 0 lbs 0 oz  GEN: well-developed, well-nourished, appears comfortable  HEENT: NCAT, PERRL, EOM intact bilaterally, sclera clear, conjunctiva normal, nose & mouth patent, mucous membranes moist  CHEST: lungs CTA bilaterally, no increased work of breathing, no wheeze, rales, rhonchi  HEART: RRR, S1 & S2, no murmur  ABD: soft, nontender, nondistended, no guarding or rigidity, +BS in all 4 quadrants  MSK: full AROM bilateral UE/LE, pedal & radial pulses 2+ bilaterally  NEURO: awake, alert, oriented to name, place, and time. CN II-XII grossly intact.  strength 4/5 bilaterally, plantar flexion/dorsiflexion 5/5 bilaterally  SKIN: warm & dry without rash, no pedal edema    Data   Recent Labs   Lab 11/18/19  0824 11/17/19  1741   WBC 9.3 12.9*   HGB 12.9 13.4   MCV 93 95    422    138   POTASSIUM 3.6 3.5   CHLORIDE 107 106   CO2 23 26   BUN 12 12   CR 0.69 0.87   ANIONGAP 6 6   DOUG 8.9 8.8   * 87     No results found for this or any  previous visit (from the past 24 hour(s)).  Medications       dexamethasone  4 mg Intravenous Q6H     famotidine  10 mg Oral BID     gabapentin  300 mg Oral TID     levothyroxine  50 mcg Oral Daily     PARoxetine  40 mg Oral Daily     propranolol ER  60 mg Oral Daily     sodium chloride (PF)  3 mL Intracatheter Q8H

## 2019-11-19 NOTE — PLAN OF CARE
A&Ox4.  Complaints of numbness/tingling/burning in bilateral upper extremities, slowly improving per patient. Decreased strength in bilaterally upper extremities 4/5. CMS in lower extremities intact. VSS.  Regular diet, thin liquids. Takes pills with water. Up with SBA. Denies pain. C collar in place at all times. Pt scoring green on the Aggression Stop Light Tool. Plan surgery tomorrow. Discharge plan pending.

## 2019-11-19 NOTE — PLAN OF CARE
A&Ox4. VSS. Numbness/tingling/burning in hands and arms. Strength in RUE/LUE 4/5; pt reports improvement from yesterday. CMS in lower extremities intact. Regular diet well tolerated. Up with SBA. Cervical collar in place at all times. Patient tearful about home situation with ex-boyfriend. She is concerned about surgery and when she'll be able to get back to work. Encouraged to seek support from friends/family and to check in with her counselor. Plan for surgery tomorrow; NPO at midnight.

## 2019-11-20 ENCOUNTER — APPOINTMENT (OUTPATIENT)
Dept: GENERAL RADIOLOGY | Facility: CLINIC | Age: 29
DRG: 029 | End: 2019-11-20
Attending: STUDENT IN AN ORGANIZED HEALTH CARE EDUCATION/TRAINING PROGRAM
Payer: COMMERCIAL

## 2019-11-20 ENCOUNTER — ANESTHESIA EVENT (OUTPATIENT)
Dept: SURGERY | Facility: CLINIC | Age: 29
DRG: 029 | End: 2019-11-20
Payer: COMMERCIAL

## 2019-11-20 ENCOUNTER — ANESTHESIA (OUTPATIENT)
Dept: SURGERY | Facility: CLINIC | Age: 29
DRG: 029 | End: 2019-11-20
Payer: COMMERCIAL

## 2019-11-20 LAB — HCG UR QL: NEGATIVE

## 2019-11-20 PROCEDURE — 22856 TOT DISC ARTHRP 1NTRSPC CRV: CPT | Mod: AS | Performed by: PHYSICIAN ASSISTANT

## 2019-11-20 PROCEDURE — 25000128 H RX IP 250 OP 636: Performed by: REGISTERED NURSE

## 2019-11-20 PROCEDURE — C1713 ANCHOR/SCREW BN/BN,TIS/BN: HCPCS | Performed by: NEUROLOGICAL SURGERY

## 2019-11-20 PROCEDURE — 95940 IONM IN OPERATNG ROOM 15 MIN: CPT | Performed by: NEUROLOGICAL SURGERY

## 2019-11-20 PROCEDURE — 25000128 H RX IP 250 OP 636: Performed by: PHYSICIAN ASSISTANT

## 2019-11-20 PROCEDURE — 25000132 ZZH RX MED GY IP 250 OP 250 PS 637: Performed by: PHYSICIAN ASSISTANT

## 2019-11-20 PROCEDURE — 0RR30JZ REPLACEMENT OF CERVICAL VERTEBRAL DISC WITH SYNTHETIC SUBSTITUTE, OPEN APPROACH: ICD-10-PCS | Performed by: NEUROLOGICAL SURGERY

## 2019-11-20 PROCEDURE — 25000132 ZZH RX MED GY IP 250 OP 250 PS 637: Performed by: INTERNAL MEDICINE

## 2019-11-20 PROCEDURE — 71000016 ZZH RECOVERY PHASE 1 LEVEL 3 FIRST HR: Performed by: NEUROLOGICAL SURGERY

## 2019-11-20 PROCEDURE — 99231 SBSQ HOSP IP/OBS SF/LOW 25: CPT | Performed by: PHYSICIAN ASSISTANT

## 2019-11-20 PROCEDURE — 36000071 ZZH SURGERY LEVEL 5 W FLUORO 1ST 30 MIN: Performed by: NEUROLOGICAL SURGERY

## 2019-11-20 PROCEDURE — 27211022 ZZHC OR IOM SUPPLIES OPNP: Performed by: NEUROLOGICAL SURGERY

## 2019-11-20 PROCEDURE — 27210794 ZZH OR GENERAL SUPPLY STERILE: Performed by: NEUROLOGICAL SURGERY

## 2019-11-20 PROCEDURE — 40000170 ZZH STATISTIC PRE-PROCEDURE ASSESSMENT II: Performed by: NEUROLOGICAL SURGERY

## 2019-11-20 PROCEDURE — 25000125 ZZHC RX 250: Performed by: NEUROLOGICAL SURGERY

## 2019-11-20 PROCEDURE — 12000000 ZZH R&B MED SURG/OB

## 2019-11-20 PROCEDURE — 25000125 ZZHC RX 250: Performed by: ANESTHESIOLOGY

## 2019-11-20 PROCEDURE — 22856 TOT DISC ARTHRP 1NTRSPC CRV: CPT | Performed by: NEUROLOGICAL SURGERY

## 2019-11-20 PROCEDURE — 36000069 ZZH SURGERY LEVEL 5 EA 15 ADDTL MIN: Performed by: NEUROLOGICAL SURGERY

## 2019-11-20 PROCEDURE — 25000125 ZZHC RX 250: Performed by: REGISTERED NURSE

## 2019-11-20 PROCEDURE — 25800030 ZZH RX IP 258 OP 636: Performed by: REGISTERED NURSE

## 2019-11-20 PROCEDURE — 37000009 ZZH ANESTHESIA TECHNICAL FEE, EACH ADDTL 15 MIN: Performed by: NEUROLOGICAL SURGERY

## 2019-11-20 PROCEDURE — 27810322 ZZHC OR SPINE - CAGE/SPACER/DISK/CORD/CONNECTOR OPNP: Performed by: NEUROLOGICAL SURGERY

## 2019-11-20 PROCEDURE — 71000017 ZZH RECOVERY PHASE 1 LEVEL 3 EA ADDTL HR: Performed by: NEUROLOGICAL SURGERY

## 2019-11-20 PROCEDURE — 37000008 ZZH ANESTHESIA TECHNICAL FEE, 1ST 30 MIN: Performed by: NEUROLOGICAL SURGERY

## 2019-11-20 PROCEDURE — 25800030 ZZH RX IP 258 OP 636: Performed by: ANESTHESIOLOGY

## 2019-11-20 PROCEDURE — 81025 URINE PREGNANCY TEST: CPT | Performed by: ANESTHESIOLOGY

## 2019-11-20 PROCEDURE — 40000277 XR SURGERY CARM FLUORO LESS THAN 5 MIN W STILLS

## 2019-11-20 PROCEDURE — 25000128 H RX IP 250 OP 636: Performed by: NEUROLOGICAL SURGERY

## 2019-11-20 DEVICE — IMPLANTABLE DEVICE: Type: IMPLANTABLE DEVICE | Site: SPINE CERVICAL | Status: FUNCTIONAL

## 2019-11-20 RX ORDER — ONDANSETRON 2 MG/ML
4 INJECTION INTRAMUSCULAR; INTRAVENOUS EVERY 30 MIN PRN
Status: DISCONTINUED | OUTPATIENT
Start: 2019-11-20 | End: 2019-11-20 | Stop reason: HOSPADM

## 2019-11-20 RX ORDER — FENTANYL CITRATE 50 UG/ML
25-50 INJECTION, SOLUTION INTRAMUSCULAR; INTRAVENOUS
Status: DISCONTINUED | OUTPATIENT
Start: 2019-11-20 | End: 2019-11-20 | Stop reason: HOSPADM

## 2019-11-20 RX ORDER — FENTANYL CITRATE 50 UG/ML
INJECTION, SOLUTION INTRAMUSCULAR; INTRAVENOUS PRN
Status: DISCONTINUED | OUTPATIENT
Start: 2019-11-20 | End: 2019-11-20

## 2019-11-20 RX ORDER — LIDOCAINE HYDROCHLORIDE 20 MG/ML
INJECTION, SOLUTION INFILTRATION; PERINEURAL PRN
Status: DISCONTINUED | OUTPATIENT
Start: 2019-11-20 | End: 2019-11-20

## 2019-11-20 RX ORDER — SODIUM CHLORIDE, SODIUM LACTATE, POTASSIUM CHLORIDE, CALCIUM CHLORIDE 600; 310; 30; 20 MG/100ML; MG/100ML; MG/100ML; MG/100ML
INJECTION, SOLUTION INTRAVENOUS CONTINUOUS
Status: DISCONTINUED | OUTPATIENT
Start: 2019-11-20 | End: 2019-11-20 | Stop reason: HOSPADM

## 2019-11-20 RX ORDER — HYDROMORPHONE HYDROCHLORIDE 1 MG/ML
.3-.5 INJECTION, SOLUTION INTRAMUSCULAR; INTRAVENOUS; SUBCUTANEOUS EVERY 5 MIN PRN
Status: DISCONTINUED | OUTPATIENT
Start: 2019-11-20 | End: 2019-11-20 | Stop reason: HOSPADM

## 2019-11-20 RX ORDER — NALOXONE HYDROCHLORIDE 0.4 MG/ML
.1-.4 INJECTION, SOLUTION INTRAMUSCULAR; INTRAVENOUS; SUBCUTANEOUS
Status: DISCONTINUED | OUTPATIENT
Start: 2019-11-20 | End: 2019-11-20

## 2019-11-20 RX ORDER — PROPOFOL 10 MG/ML
INJECTION, EMULSION INTRAVENOUS PRN
Status: DISCONTINUED | OUTPATIENT
Start: 2019-11-20 | End: 2019-11-20

## 2019-11-20 RX ORDER — DEXAMETHASONE SODIUM PHOSPHATE 10 MG/ML
4 INJECTION, SOLUTION INTRAMUSCULAR; INTRAVENOUS ONCE
Status: DISCONTINUED | OUTPATIENT
Start: 2019-11-20 | End: 2019-11-20 | Stop reason: CLARIF

## 2019-11-20 RX ORDER — FENTANYL CITRATE 50 UG/ML
50 INJECTION, SOLUTION INTRAMUSCULAR; INTRAVENOUS EVERY 5 MIN PRN
Status: DISCONTINUED | OUTPATIENT
Start: 2019-11-20 | End: 2019-11-20 | Stop reason: HOSPADM

## 2019-11-20 RX ORDER — CEFAZOLIN SODIUM 2 G/100ML
2 INJECTION, SOLUTION INTRAVENOUS
Status: COMPLETED | OUTPATIENT
Start: 2019-11-20 | End: 2019-11-20

## 2019-11-20 RX ORDER — CEFAZOLIN SODIUM 1 G/3ML
1 INJECTION, POWDER, FOR SOLUTION INTRAMUSCULAR; INTRAVENOUS ONCE
Status: COMPLETED | OUTPATIENT
Start: 2019-11-20 | End: 2019-11-20

## 2019-11-20 RX ORDER — ONDANSETRON 2 MG/ML
INJECTION INTRAMUSCULAR; INTRAVENOUS PRN
Status: DISCONTINUED | OUTPATIENT
Start: 2019-11-20 | End: 2019-11-20

## 2019-11-20 RX ORDER — ONDANSETRON 4 MG/1
4 TABLET, ORALLY DISINTEGRATING ORAL EVERY 30 MIN PRN
Status: DISCONTINUED | OUTPATIENT
Start: 2019-11-20 | End: 2019-11-20 | Stop reason: HOSPADM

## 2019-11-20 RX ORDER — SCOLOPAMINE TRANSDERMAL SYSTEM 1 MG/1
1 PATCH, EXTENDED RELEASE TRANSDERMAL ONCE
Status: COMPLETED | OUTPATIENT
Start: 2019-11-20 | End: 2019-11-20

## 2019-11-20 RX ORDER — PROPOFOL 10 MG/ML
INJECTION, EMULSION INTRAVENOUS CONTINUOUS PRN
Status: DISCONTINUED | OUTPATIENT
Start: 2019-11-20 | End: 2019-11-20

## 2019-11-20 RX ORDER — SODIUM CHLORIDE, SODIUM LACTATE, POTASSIUM CHLORIDE, CALCIUM CHLORIDE 600; 310; 30; 20 MG/100ML; MG/100ML; MG/100ML; MG/100ML
INJECTION, SOLUTION INTRAVENOUS CONTINUOUS PRN
Status: DISCONTINUED | OUTPATIENT
Start: 2019-11-20 | End: 2019-11-20

## 2019-11-20 RX ADMIN — MIDAZOLAM 2 MG: 1 INJECTION INTRAMUSCULAR; INTRAVENOUS at 12:12

## 2019-11-20 RX ADMIN — DEXAMETHASONE SODIUM PHOSPHATE 4 MG: 4 INJECTION, SOLUTION INTRAMUSCULAR; INTRAVENOUS at 06:03

## 2019-11-20 RX ADMIN — FAMOTIDINE 10 MG: 10 TABLET, FILM COATED ORAL at 08:45

## 2019-11-20 RX ADMIN — ONDANSETRON 4 MG: 2 INJECTION INTRAMUSCULAR; INTRAVENOUS at 14:35

## 2019-11-20 RX ADMIN — LEVOTHYROXINE SODIUM 50 MCG: 50 TABLET ORAL at 08:45

## 2019-11-20 RX ADMIN — FENTANYL CITRATE 50 MCG: 50 INJECTION, SOLUTION INTRAMUSCULAR; INTRAVENOUS at 14:40

## 2019-11-20 RX ADMIN — PROPRANOLOL HYDROCHLORIDE 60 MG: 60 CAPSULE, EXTENDED RELEASE ORAL at 08:45

## 2019-11-20 RX ADMIN — REMIFENTANIL HYDROCHLORIDE 0.25 MCG/KG/MIN: 1 INJECTION, POWDER, LYOPHILIZED, FOR SOLUTION INTRAVENOUS at 12:28

## 2019-11-20 RX ADMIN — SODIUM CHLORIDE, POTASSIUM CHLORIDE, SODIUM LACTATE AND CALCIUM CHLORIDE: 600; 310; 30; 20 INJECTION, SOLUTION INTRAVENOUS at 12:25

## 2019-11-20 RX ADMIN — OXYCODONE HYDROCHLORIDE AND ACETAMINOPHEN 2 TABLET: 5; 325 TABLET ORAL at 22:02

## 2019-11-20 RX ADMIN — PROPOFOL 200 MG: 10 INJECTION, EMULSION INTRAVENOUS at 12:15

## 2019-11-20 RX ADMIN — DEXAMETHASONE SODIUM PHOSPHATE 4 MG: 4 INJECTION, SOLUTION INTRAMUSCULAR; INTRAVENOUS at 09:54

## 2019-11-20 RX ADMIN — OXYCODONE HYDROCHLORIDE AND ACETAMINOPHEN 1 TABLET: 5; 325 TABLET ORAL at 17:59

## 2019-11-20 RX ADMIN — GABAPENTIN 300 MG: 300 CAPSULE ORAL at 22:02

## 2019-11-20 RX ADMIN — FAMOTIDINE 10 MG: 10 TABLET, FILM COATED ORAL at 20:36

## 2019-11-20 RX ADMIN — SODIUM CHLORIDE, POTASSIUM CHLORIDE, SODIUM LACTATE AND CALCIUM CHLORIDE: 600; 310; 30; 20 INJECTION, SOLUTION INTRAVENOUS at 11:16

## 2019-11-20 RX ADMIN — CEFAZOLIN SODIUM 2 G: 2 INJECTION, SOLUTION INTRAVENOUS at 12:50

## 2019-11-20 RX ADMIN — CEFAZOLIN 1 G: 1 INJECTION, POWDER, FOR SOLUTION INTRAMUSCULAR; INTRAVENOUS at 18:35

## 2019-11-20 RX ADMIN — FENTANYL CITRATE 100 MCG: 50 INJECTION, SOLUTION INTRAMUSCULAR; INTRAVENOUS at 12:15

## 2019-11-20 RX ADMIN — SCOPALAMINE 1 PATCH: 1 PATCH, EXTENDED RELEASE TRANSDERMAL at 11:50

## 2019-11-20 RX ADMIN — OXYCODONE HYDROCHLORIDE AND ACETAMINOPHEN 1 TABLET: 5; 325 TABLET ORAL at 08:56

## 2019-11-20 RX ADMIN — PAROXETINE 40 MG: 40 TABLET, FILM COATED ORAL at 08:45

## 2019-11-20 RX ADMIN — LIDOCAINE HYDROCHLORIDE 100 MG: 20 INJECTION, SOLUTION INFILTRATION; PERINEURAL at 12:15

## 2019-11-20 RX ADMIN — GABAPENTIN 300 MG: 300 CAPSULE ORAL at 08:46

## 2019-11-20 RX ADMIN — SUCCINYLCHOLINE CHLORIDE 100 MG: 20 INJECTION, SOLUTION INTRAMUSCULAR; INTRAVENOUS; PARENTERAL at 12:15

## 2019-11-20 RX ADMIN — PROPOFOL 150 MCG/KG/MIN: 10 INJECTION, EMULSION INTRAVENOUS at 12:23

## 2019-11-20 ASSESSMENT — ACTIVITIES OF DAILY LIVING (ADL)
ADLS_ACUITY_SCORE: 10
ADLS_ACUITY_SCORE: 11

## 2019-11-20 ASSESSMENT — LIFESTYLE VARIABLES: TOBACCO_USE: 1

## 2019-11-20 NOTE — ANESTHESIA POSTPROCEDURE EVALUATION
Patient: Gayathri Gomez    Procedure(s):  ANTERIOR CERVICAL DISCECTOMY AT C6-7; CERVICAL ARTHROPLASTY    Diagnosis:Cervical disc disorder at C6-C7 level with myelopathy [M50.023]  Diagnosis Additional Information: No value filed.    Anesthesia Type:  General, ETT, RSI    Note:  Anesthesia Post Evaluation    Patient location during evaluation: PACU  Patient participation: Able to fully participate in evaluation  Level of consciousness: awake and alert  Pain management: adequate  Airway patency: patent  Cardiovascular status: acceptable  Respiratory status: acceptable  Hydration status: acceptable  PONV: none     Anesthetic complications: None          Last vitals:  Vitals:    11/20/19 1620 11/20/19 1627 11/20/19 1645   BP: 120/71  119/71   Pulse: 57     Resp: 24  16   Temp:   37.3  C (99.1  F)   SpO2: 99% 99% 99%         Electronically Signed By: Matt Corona MD  November 20, 2019  4:47 PM

## 2019-11-20 NOTE — PLAN OF CARE
Elective surgery today for a C7 superior facet Fx. A&O x 4. CMS intact. Bowel sounds active, passing flatus, NPO since midnight. VSS on RA. Up with stand by assist, but does not call, and knows how to turn off the bed alarm. C/o neck pain that radiates to the head, 6/10 pain, decreased with percocet. Pt scoring green on the Aggression Stop Light Tool. Plan TBD post surgery.

## 2019-11-20 NOTE — PROGRESS NOTES
Progress note    Patient states she is feeling ok. Arms feel a little better. No pain with swallowing    Exam:    Alert and oriented  NAD  MORALES  Bandage CDI      Assessment    POD 0 s/p C6-7 ACD arthroplasty    Plan    Diet as tolerated  Anticipate discharge tomorrow  Monitor breathing and swallowing overnight

## 2019-11-20 NOTE — PLAN OF CARE
A&Ox4. CMS intact in lower extremities. Complaints of numbness/burning in bilateral forearms. Strength in upper arms 4/5. VSS. NPO for surgery.  Takes pills with water. Up with SBA. Complaints of head/neck pain rating 6/10, managed with PRN percocet.  Pt scoring green on the Aggression Stop Light Tool. Plan surgery today at 1115. Discharge TBD.

## 2019-11-20 NOTE — PROVIDER NOTIFICATION
MD Notification    Notified Person: NP    Notified Person Name: Isabella Olsen NP    Notification Date/Time: 11/19/19 2110    Notification Interaction: Paged    Purpose of Notification: Pt requesting Benadryl for sleep, does not want to take melatonin.     Orders Received: None, due to recent head injury probably not safe for patient to take benadryl.     Comments:

## 2019-11-20 NOTE — PROGRESS NOTES
[facility]    Medicine Progress Note - Hospitalist Service       Date of Admission:  11/17/2019    Assessment & Plan   Gayathri Gomez is a 29 year old female with history of anxiety, ADHD, depression and hypothyroidism who had a mechanical fall and C-spine injury, presented to [facility] ED and was transferred to [facility] for finding of central cord syndrome.    Status-post mechanical fall   Closed head injury with LOC  C7 superior facet fracture, left  Central cord syndrome  C6/C7 herniated disc  Pt presented 24h following mechanical fall with closed head injury and initial 30 seconds of paralysis and residual bilateral upper extremity weakness and numbness. MRI Cspine indicating C6-C7 disc extrusion with moderate canal compromise and narrowing of bilateral neural foramina with possible early cord edema at C6. Ligamentous structures appeared intact. CT Cspine indicated acute nondisplaced fx through left C7 facet. CT head negative.   - Neurosurgery consulted & following, pt is s/p placement of Aspen collar, OR for microdiscectomy today  - Maintain cervical collar at all times  - Continues on IV Decadron 4mg q6h per neurosurgery  - PT/OT evaluation and treat  - Neurology consulted for concussion eval, follow-up if symptoms present, no further intervention warranted  - NPO    Anxiety  ADHD  Depression  Hypothyroidism  - Resume PTA medication      Diet: NPO per Anesthesia Guidelines for Procedure/Surgery Except for: Meds    DVT Prophylaxis: Pneumatic Compression Devices and Ambulate every shift  Meier Catheter: not present  Code Status: Full Code      Disposition Plan   Expected discharge: 1-2 days, recommended to prior living arrangement once evaluated by PT/OT, cleared by surgical service, safe discharge.  Entered: Jason Robles PA-C 11/20/2019, 10:44 AM       The patient's care was discussed with the Attending Physician, Dr. Ngo, Bedside Nurse, Care Coordinator/,  Patient and Patient's Family.    Jason Robles PA-C  Hospitalist Service  [facility]    ______________________________________________________________________    Interval History   Pt seen & evaluated just prior to leaving floor for OR procedure. Worried about procedure, is able to verbalize indications and reasons the surgery team is recommending. Has ongoing muscle weakness to bilateral upper extremities, has overall improved from admission but recovery has been slow. No cp, sob.    Data reviewed today: I reviewed all medications, new labs and imaging results over the last 24 hours. I personally reviewed no images or EKG's today.    Physical Exam   Vital Signs: Temp: 98.2  F (36.8  C) Temp src: Oral BP: 119/82 Pulse: 63 Heart Rate: 63 Resp: 16 SpO2: 98 % O2 Device: None (Room air)    Weight: 0 lbs 0 oz  GEN: well-developed, well-nourished, appears comfortable  HEENT: NCAT, PERRL, EOM intact bilaterally, sclera clear, conjunctiva normal, nose & mouth patent, mucous membranes moist  CHEST: lungs CTA bilaterally, no increased work of breathing, no wheeze, rales, rhonchi  HEART: RRR, S1 & S2, no murmur  ABD: soft, nontender, nondistended, no guarding or rigidity, +BS in all 4 quadrants  MSK: full AROM bilateral UE/LE, pedal & radial pulses 2+ bilaterally  NEURO: awake, alert, oriented to name, place, and time. CN II-XII grossly intact.  strength 4/5 bilaterally  SKIN: warm & dry without rash, no pedal edema    Data   Recent Labs   Lab 11/18/19  0824 11/17/19  1741   WBC 9.3 12.9*   HGB 12.9 13.4   MCV 93 95    422    138   POTASSIUM 3.6 3.5   CHLORIDE 107 106   CO2 23 26   BUN 12 12   CR 0.69 0.87   ANIONGAP 6 6   DOUG 8.9 8.8   * 87     No results found for this or any previous visit (from the past 24 hour(s)).  Medications       [Auto Hold] dexamethasone  4 mg Intravenous Q6H     [Auto Hold] famotidine  10 mg Oral BID     [Auto Hold] gabapentin  300 mg Oral TID     [Auto  Hold] levothyroxine  50 mcg Oral Daily     [Auto Hold] PARoxetine  40 mg Oral Daily     [Auto Hold] propranolol ER  60 mg Oral Daily     [Auto Hold] sodium chloride (PF)  3 mL Intracatheter Q8H

## 2019-11-20 NOTE — ANESTHESIA CARE TRANSFER NOTE
Patient: Gayathri Gomez    Procedure(s):  ANTERIOR CERVICAL DISCECTOMY AT C6-7; CERVICAL ARTHROPLASTY    Diagnosis: Cervical disc disorder at C6-C7 level with myelopathy [M50.023]  Diagnosis Additional Information: No value filed.    Anesthesia Type:   General, ETT, RSI     Note:  Airway :Face Mask  Patient transferred to:PACU  Comments: Transferred to PACU, spontaneous respirations, 10L oxygen via facemask.  All monitors and alarms on and functioning, VSS.  Patient awake, comfortable.  Report to PACU RN.Handoff Report: Identifed the Patient, Identified the Reponsible Provider, Reviewed the pertinent medical history, Discussed the surgical course, Reviewed Intra-OP anesthesia mangement and issues during anesthesia, Set expectations for post-procedure period and Allowed opportunity for questions and acknowledgement of understanding      Vitals: (Last set prior to Anesthesia Care Transfer)    CRNA VITALS  11/20/2019 1436 - 11/20/2019 1515      11/20/2019             Pulse:  80    SpO2:  100 %    Resp Rate (observed):  10    Resp Rate (set):  10                Electronically Signed By: JEFF Garcia CRNA  November 20, 2019  3:15 PM

## 2019-11-20 NOTE — ANESTHESIA PREPROCEDURE EVALUATION
Anesthesia Pre-Procedure Evaluation    Patient: Gayathri Gomez   MRN: [id number] : 1990          Preoperative Diagnosis: Cervical disc disorder at C6-C7 level with myelopathy [M50.023]    Procedure(s):  ANTERIOR CERVICAL DISCECTOMY AT C6-7; CERVICAL ARTHROPLASTY; (C-ARM, MICROSCOPE, LEICA OH5, MEDTRONIC PRESTIGE II, SPINAL CORD MONITORING)^^    Past Medical History:   Diagnosis Date     ADHD (attention deficit hyperactivity disorder)      Anxiety      No past surgical history on file.    Anesthesia Evaluation     .             ROS/MED HX    ENT/Pulmonary:     (+)tobacco use, Current use , . .   (-) sleep apnea   Neurologic:     (+)neuropathy - B/L upper extremities,     Cardiovascular:         METS/Exercise Tolerance:     Hematologic:         Musculoskeletal:         GI/Hepatic:        (-) GERD   Renal/Genitourinary:         Endo:     (+) thyroid problem hypothyroidism, .      Psychiatric:     (+) psychiatric history other (comment), anxiety and depression      Infectious Disease:         Malignancy:         Other:                          Physical Exam  Normal systems: cardiovascular, pulmonary and dental    Airway   Mallampati: II  TM distance: >3 FB  Neck ROM: full    Dental     Cardiovascular       Pulmonary     Other findings: Patient was chuy  C-collar, but was moving her head and neck all around during interview.  She denied an increase in symptoms with movement.        Lab Results   Component Value Date    WBC 9.3 2019    HGB 12.9 2019    HCT 38.3 2019     2019     2019    POTASSIUM 3.6 2019    CHLORIDE 107 2019    CO2 23 2019    BUN 12 2019    CR 0.69 2019     (H) 2019    DOUG 8.9 2019    ALBUMIN 4.1 2019    PROTTOTAL 7.9 2019    ALT 22 2019    AST 26 2019    ALKPHOS 92 2019    BILITOTAL 0.5 2019    TSH 2.36 2019    HCG Negative 2019    HCGS Negative 2019  "      Preop Vitals  BP Readings from Last 3 Encounters:   11/20/19 110/65   11/17/19 105/75   08/16/19 122/80    Pulse Readings from Last 3 Encounters:   11/19/19 66   11/17/19 68   08/16/19 82      Resp Readings from Last 3 Encounters:   11/20/19 18   11/17/19 18   08/16/19 17    SpO2 Readings from Last 3 Encounters:   11/20/19 99%   11/17/19 100%   08/16/19 98%      Temp Readings from Last 1 Encounters:   11/20/19 36.9  C (98.4  F) (Oral)    Ht Readings from Last 1 Encounters:   11/17/19 1.651 m (5' 5\")      Wt Readings from Last 1 Encounters:   11/17/19 81.6 kg (180 lb)    Estimated body mass index is 29.95 kg/m  as calculated from the following:    Height as of an earlier encounter on 11/17/19: 1.651 m (5' 5\").    Weight as of an earlier encounter on 11/17/19: 81.6 kg (180 lb).       Anesthesia Plan      History & Physical Review  History and physical reviewed and following examination; no interval change.    ASA Status:  2 .    NPO Status:  > 8 hours    Plan for General, ETT and RSI with Intravenous and Propofol induction. Maintenance will be TIVA.    PONV prophylaxis:  Ondansetron (or other 5HT-3)  Additional equipment: Videolaryngoscope and 2nd IV Decadron and Zofran  Propofol and Tomer gtt's      Postoperative Care  Postoperative pain management:  IV analgesics and Oral pain medications.      Consents  Anesthetic plan, risks, benefits and alternatives discussed with:  Patient..                 Matt Corona MD  "

## 2019-11-20 NOTE — PROGRESS NOTES
Cross- Cover  Patient requesting Benadryl for sleep. Per EHR review there is concern for concussion and will avoid any potentially over-sedating medications.     JEFF Atkins, CNP  Hospitalist Service, House Officer  [facility]     Text Page  Pager: [phone]

## 2019-11-21 ENCOUNTER — APPOINTMENT (OUTPATIENT)
Dept: PHYSICAL THERAPY | Facility: CLINIC | Age: 29
DRG: 029 | End: 2019-11-21
Attending: INTERNAL MEDICINE
Payer: COMMERCIAL

## 2019-11-21 VITALS
SYSTOLIC BLOOD PRESSURE: 104 MMHG | DIASTOLIC BLOOD PRESSURE: 56 MMHG | RESPIRATION RATE: 16 BRPM | TEMPERATURE: 99.3 F | OXYGEN SATURATION: 97 % | HEART RATE: 57 BPM

## 2019-11-21 LAB — GLUCOSE BLDC GLUCOMTR-MCNC: 112 MG/DL (ref 70–99)

## 2019-11-21 PROCEDURE — 00000146 ZZHCL STATISTIC GLUCOSE BY METER IP

## 2019-11-21 PROCEDURE — 25000132 ZZH RX MED GY IP 250 OP 250 PS 637: Performed by: INTERNAL MEDICINE

## 2019-11-21 PROCEDURE — 97161 PT EVAL LOW COMPLEX 20 MIN: CPT | Mod: GP

## 2019-11-21 PROCEDURE — 99239 HOSP IP/OBS DSCHRG MGMT >30: CPT | Performed by: HOSPITALIST

## 2019-11-21 PROCEDURE — 25000132 ZZH RX MED GY IP 250 OP 250 PS 637: Performed by: PHYSICIAN ASSISTANT

## 2019-11-21 PROCEDURE — 97116 GAIT TRAINING THERAPY: CPT | Mod: GP

## 2019-11-21 RX ORDER — TIZANIDINE 2 MG/1
2-4 TABLET ORAL 3 TIMES DAILY PRN
Qty: 60 TABLET | Refills: 1 | Status: SHIPPED | OUTPATIENT
Start: 2019-11-21 | End: 2021-02-17

## 2019-11-21 RX ORDER — OXYCODONE AND ACETAMINOPHEN 5; 325 MG/1; MG/1
1-2 TABLET ORAL EVERY 4 HOURS PRN
Qty: 30 TABLET | Refills: 0 | Status: SHIPPED | OUTPATIENT
Start: 2019-11-21 | End: 2019-12-03

## 2019-11-21 RX ADMIN — OXYCODONE HYDROCHLORIDE AND ACETAMINOPHEN 1 TABLET: 5; 325 TABLET ORAL at 13:16

## 2019-11-21 RX ADMIN — LEVOTHYROXINE SODIUM 50 MCG: 50 TABLET ORAL at 08:58

## 2019-11-21 RX ADMIN — OXYCODONE HYDROCHLORIDE AND ACETAMINOPHEN 2 TABLET: 5; 325 TABLET ORAL at 02:17

## 2019-11-21 RX ADMIN — PROPRANOLOL HYDROCHLORIDE 60 MG: 60 CAPSULE, EXTENDED RELEASE ORAL at 08:58

## 2019-11-21 RX ADMIN — FAMOTIDINE 10 MG: 10 TABLET, FILM COATED ORAL at 08:58

## 2019-11-21 RX ADMIN — GABAPENTIN 300 MG: 300 CAPSULE ORAL at 08:58

## 2019-11-21 RX ADMIN — OXYCODONE HYDROCHLORIDE AND ACETAMINOPHEN 1 TABLET: 5; 325 TABLET ORAL at 06:24

## 2019-11-21 RX ADMIN — PAROXETINE 40 MG: 40 TABLET, FILM COATED ORAL at 08:58

## 2019-11-21 ASSESSMENT — ACTIVITIES OF DAILY LIVING (ADL)
ADLS_ACUITY_SCORE: 10
ADLS_ACUITY_SCORE: 9

## 2019-11-21 NOTE — PLAN OF CARE
POD #1 from a C6-7 anterior diskectomy. A&Ox4. CMS numbness/tinging in both forearms and right upper arm. Bowel sounds active, passing flatus, tolerating regular diet. VSS. Dressing CDI. Aspen collar in place for comfort. Up with SBA/GB.  Voiding adequately, PVR 10ml. C/o 4/10 pain, decreased with percocet. Pt scoring gree on the Aggression Stop Light Tool. Plan to discharge home today after PT/OT.

## 2019-11-21 NOTE — PROGRESS NOTES
11/21/19 0830   Quick Adds   Type of Visit Initial PT Evaluation   Living Environment   Lives With parent(s)   Living Arrangements house   Home Accessibility stairs within home   Number of Stairs, Within Home, Primary 7   Stair Railings, Within Home, Primary railings on both sides of stairs   Transportation Anticipated family or friend will provide   Living Environment Comment Pt is planning to stay with parents in their split level home with 7 steps to access bed and bathroom.    Self-Care   Usual Activity Tolerance good   Current Activity Tolerance moderate   Regular Exercise No   Equipment Currently Used at Home none   Activity/Exercise/Self-Care Comment Pt was ind  with all ADLs and IADL's   Functional Level Prior   Ambulation 0-->independent   Transferring 0-->independent   Toileting 0-->independent   Bathing 0-->independent   Communication 0-->understands/communicates without difficulty   Swallowing 0-->swallows foods/liquids without difficulty   Cognition 0 - no cognition issues reported   Fall history within last six months no   Prior Functional Level Comment Pt was ind with ambulation   General Information   Onset of Illness/Injury or Date of Surgery - Date 11/17/19   Referring Physician Casimiro Sheikh PA-C   Patient/Family Goals Statement Go home   Pertinent History of Current Problem (include personal factors and/or comorbidities that impact the POC) Pt is a 29 yr old female admitted with fall and fx of C6-C7 fx. Pt is now s/p Anterior cervical discectomy at C6-7 with decompression of ventral spinal cord POD 1   Precautions/Limitations fall precautions;sternal precautions  (C-spine precautions)   Weight-Bearing Status - LLE full weight-bearing   Weight-Bearing Status - RLE full weight-bearing   General Observations Pleasant and cooperative   General Info Comments Activity: up with assist.    Cognitive Status Examination   Orientation orientation to person, place and time   Level of Consciousness  "alert   Follows Commands and Answers Questions 100% of the time   Personal Safety and Judgment intact   Memory intact   Pain Assessment   Patient Currently in Pain Yes, see Vital Sign flowsheet  (3/10 pain reproted post ambulation)   Integumentary/Edema   Integumentary/Edema Comments Aspen collar on   Range of Motion (ROM)   ROM Comment BLE: WFL   Strength   Strength Comments BLE: 4+/5    Bed Mobility   Bed Mobility Comments Supine<>sit: independent   Transfer Skills   Transfer Comments STS at independent   Gait   Gait Comments 20 ft without AD's and CGA   Balance   Balance Comments Sitting:Good   Sensory Examination   Sensory Perception no deficits were identified   Coordination   Coordination no deficits were identified   Muscle Tone   Muscle Tone no deficits were identified   General Therapy Interventions   Planned Therapy Interventions balance training;gait training;transfer training;risk factor education   Clinical Impression   Criteria for Skilled Therapeutic Intervention yes, treatment indicated   PT Diagnosis Impaired gait   Influenced by the following impairments Limited by C-spine precautions and brace   Functional limitations due to impairments assistance needed with mobility   Clinical Presentation Stable/Uncomplicated   Clinical Presentation Rationale clinical judgement   Clinical Decision Making (Complexity) Low complexity   Therapy Frequency Daily   Predicted Duration of Therapy Intervention (days/wks) 5   Anticipated Discharge Disposition Home with Assist   Risk & Benefits of therapy have been explained Yes   Patient, Family & other staff in agreement with plan of care Yes   Clinical Impression Comments Pt presents with impiared balance and strength limiting independence with mobility. Pt will benefit from an additonal visist to [facility] on safety/compensatory techniques to achieve increased independence with mobility   [facility] AM-PAC  \"6 Clicks\" V.2 Basic Mobility Inpatient Short Form   1. " Turning from your back to your side while in a flat bed without using bedrails? 4 - None   2. Moving from lying on your back to sitting on the side of a flat bed without using bedrails? 4 - None   3. Moving to and from a bed to a chair (including a wheelchair)? 4 - None   4. Standing up from a chair using your arms (e.g., wheelchair, or bedside chair)? 4 - None   5. To walk in hospital room? 3 - A Little   6. Climbing 3-5 steps with a railing? 3 - A Little   Basic Mobility Raw Score (Score out of 24.Lower scores equate to lower levels of function) 22   Total Evaluation Time   Total Evaluation Time (Minutes) 15

## 2019-11-21 NOTE — PLAN OF CARE
A&Ox4. BUE numbness/tingling lateral arms elbow to pinky. Bowel sounds active, + flatus, tolerating Reg diet. VSS. Dressing removed by neurosurgery, pt requested a new dressing. Dressing placed for comfort (ok with Johnny GONSALVES). Dressing CDI. Up with SBA. Pain decreased with percocet. Plan to discharge home. Discharge meds, instructions, AVS and follow up reviewed with pt and mother prior to discharge. Belongings returned to pt from security.

## 2019-11-21 NOTE — OP NOTE
Operative procedure: Anterior cervical discectomy at C6-7 with decompression of ventral spinal cord; placement of cervical arthroplasty at C6-7 using Prestige II artificial disc.;  Use the operating microscope; intraoperative motor, sensory evoked potential monitoring.    Preoperative diagnosis: Large central and right-sided disc herniation producing spinal cord impingement and central cord syndrome.    Postoperative diagnosis: Same    Surgeon: Bennett Thacker MD    Assistant: Casimiro Holguin PA-C    Material forwarded to the laboratory for examination: None    Description of the procedure: Patient was brought to the operating room where she is placed under suitable general endotracheal anesthesia and somatosensory and motor evoked potential monitoring was initiated.  Baseline waveforms were obtained and verified.  She was then position with the neck slightly extended and her head turned slightly toward the left side.  There was no reported change in MEP or SSEP waveforms.  Right side of her neck was sterilely prepped and draped in the usual fashion and a transverse incision approximately 5 cm in length was made centered on the anterior border the sternocleidomastoid muscle at the level chosen with use of lateral fluoroscopic guidance.  Sharp dissection proceeded through the platysmal muscle, along the anterior border of the sternocleidomastoid muscle, through the middle cervical fascia and directly under the ventral aspect of the cervical vertebral C6 and C7.  Level was verified with a needle marker and subsequently marked using a suture.  Medial edges of the longus coli muscle were coagulated and elevated and 40 mm  retractors were placed.  [place] intervertebral body retractor system was placed as well.  Distraction was initiated across the disc space in the operating microscope was brought into use and used throughout the remainder of the procedure for visualization, illumination and microsurgical  technique.  Anterior longitudinal ligament and annulus were opened sharply at the number with a #15 blade and the contents of the disc space which largely appeared healthy or entirely exonerated.  At the posterior margin of the disc a rather large rent was discovered through the annulus with a large fragment of fresh disc herniation extending into the spinal canal and compressing the central and right side of the spinal cord.  Posterior longitudinal ligament was opened from foramen to foramen to ensure that the cord was adequately decompressed.  Proximal right foraminotomies was also performed.  Following completion of neurological decompression the adjacent endplates were decorticated and anterior spurs were drilled flush with the vertebral body.  Subsequent Prestige artificial cervical disc template was used to measure this space and subsequently using standard technique a 7 x 18 mm Prestige II device was inserted without difficulty.  AP and lateral fluoroscopy appeared satisfactory.  Application hardware was removed and bleeding points were carefully controlled using bipolar cautery.  Retropharyngeal space was copiously irrigated with Ancef containing irrigation there is an no evidence of injury to medial or lateral structures as a result of the dissection or the retractors.  Wound was then closed in layers using interrupted inverted 3-0 Vicryl suture with a running undyed 4-0 Vicryl in a subcuticular stitch for skin.    Somatosensory evoked potential monitoring was continued without interruption throughout the surgical procedure.  There were no reported changes.  Motor evoked potential monitoring was rechecked with each portion of the procedure and there were no reported changes nor EMG activity throughout the case.    Sterile dressing was applied and the patient was awakened and found to be neurologically intact.  She was transported to recovery room in stable condition.

## 2019-11-21 NOTE — PLAN OF CARE
Discharge Planner OT   Patient plan for discharge: not stated   Current status: Orders received chart reviewed. Per discussion with PT, Pt. Is (I) with toileting, requiring supervision for all mobility. Patient's mom will be home to assist with all IADLs if needed.   Barriers to return to prior living situation: defer to PT  Recommendations for discharge: defer to PT   Rationale for recommendations: No skilled OT needed at this time. Defer to PT.        Entered by: Kristyn Kohler 11/21/2019 1:58 PM

## 2019-11-21 NOTE — DISCHARGE SUMMARY
[facility]  Hospitalist Discharge Summary       Date of Admission:  11/17/2019  Date of Discharge:  11/21/2019  2:54 PM  Discharging Provider: Van Ngo MD      Discharge Diagnoses    1. C7 superior facet fracture  2. Central cord syndrome  3. C6-C7 disc herniation s/p anterior cervical discectomy at C6-7 and cervical arthroplasty  4. 1-3 secondary mechanical fall  5. Anxiety/Depression & ADHD  6. Hypothyroidism    Follow-ups Needed After Discharge   Follow-up Appointments     Follow-up and recommended labs and tests       6 weeks with NSG. [phone]           Unresulted Labs Ordered in the Past 30 Days of this Admission     No orders found from 10/18/2019 to 11/18/2019.      These results will be followed up by NA    Discharge Disposition   Discharged to home  Condition at discharge: Stable    Hospital Course   Gayathri Gomez is a 29 year old female with history of anxiety, ADHD, depression and hypothyroidism who had a mechanical fall and C-spine injury, presented to [facility] ED and was transferred to [facility] for finding of central cord syndrome.     Status-post mechanical fall   Closed head injury with LOC  C7 superior facet fracture, left  Central cord syndrome  C6/C7 herniated disc  s/p anterior cervical discectomy at C6-7 and cervical arthroplasty  Pt presented 24h following mechanical fall with closed head injury and initial 30 seconds of paralysis and residual bilateral upper extremity weakness and numbness. MRI Cspine indicating C6-C7 disc extrusion with moderate canal compromise and narrowing of bilateral neural foramina with possible early cord edema at C6. Ligamentous structures appeared intact. CT Cspine indicated acute nondisplaced fx through left C7 facet. CT head negative.   - Neurosurgery consulted & following, pt is s/p placement of Aspen collar, OR for microdiscectomy 11/20  - Maintain cervical collar at all times  - Continues on IV Decadron 4mg q6h per  neurosurgery  - PT/OT evaluation and treat - ok for discharge to home; per NS activity restrictions as below  - Neurology consulted for concussion eval, follow-up if symptoms present, no further intervention warranted  - C collar to be worn until follow up     Anxiety  ADHD  Depression  Hypothyroidism  - Resume PTA medication        Consultations This Hospital Stay   NEUROLOGY IP CONSULT  NEUROSURGERY IP CONSULT  SOCIAL WORK IP CONSULT  PHYSICAL THERAPY ADULT IP CONSULT  OCCUPATIONAL THERAPY ADULT IP CONSULT    Code Status   Full Code    Time Spent on this Encounter   I, Van Ngo MD, personally saw the patient today and spent greater than 30 minutes discharging this patient.       Van Ngo MD  [facility]  ______________________________________________________________________    Physical Exam   Vital Signs: Temp: 99.3  F (37.4  C) Temp src: Oral BP: 104/56   Heart Rate: 54 Resp: 16 SpO2: 97 % O2 Device: None (Room air)    Weight: 0 lbs 0 oz    Gen: NAD, pleasant  HEENT: Normocephalic, EOMI, MMM  Resp: no crackles,  no wheezes, no increased work of resp  CV: S1S2 heard, reg rhythm, reg rate, no pedal edema  Abdo: soft, nontender, nondistended, bowel sounds present  Ext: calves nontender, well perfused  Neuro: AAOx3, CN grossly intact, no facial asymmetry, UE  strength sym and unremarkable         Primary Care Physician   Ramona Ann Aaseby-Aguilera    Discharge Orders      Reason for your hospital stay    Neck surgery     Follow-up and recommended labs and tests     6 weeks with NSG. [phone]     Activity    Your activity upon discharge: activity as tolerated, no lifting more than 10 lbs.     Wound care and dressings    Instructions to care for your wound at home: keep wound clean and dry and may get incision wet in shower but do not soak or scrub.     Discharge Instructions    Discharge instructions:  No lifting of more than 10 pounds until follow up visit.  Ok to shampoo  hair, shower or bathe, but, do not scrub or submerge incision under water until evaluated post operatively in clinic.    Ok to walk as tolerated, avoid bedrest.    No contact sports until after follow up visit  No high impact activities such as; running/jogging, snowmobile or 4 schmidt riding or any other recreational vehicles  No dog grooming until discussed at follow up    Call my office at [phone] for increasing redness, swelling or pus draining from the incision, increased pain or any other questions and concerns.    Go to ER with any seizure activity, mental status change (increasing confusion), difficulty with speech or increasing or acute weakness     Full Code     Diet    Follow this diet upon discharge: Regular       Significant Results and Procedures   Most Recent 3 CBC's:  Recent Labs   Lab Test 11/18/19  0824 11/17/19  1741 04/08/19  0905   WBC 9.3 12.9* 11.1*   HGB 12.9 13.4 13.5   MCV 93 95 90    422 383     Most Recent 3 BMP's:  Recent Labs   Lab Test 11/18/19  0824 11/17/19  1741 04/08/19  0905    138 139   POTASSIUM 3.6 3.5 3.1*   CHLORIDE 107 106 107   CO2 23 26 25   BUN 12 12 11   CR 0.69 0.87 0.72   ANIONGAP 6 6 7   DOUG 8.9 8.8 9.5   * 87 102*     Most Recent TSH and T4:  Recent Labs   Lab Test 08/05/19  0856   TSH 2.36     Most Recent Urinalysis:  Recent Labs   Lab Test 03/20/17  1317   COLOR Yellow   APPEARANCE Clear   URINEGLC Negative   URINEBILI Negative   URINEKETONE Negative   SG >1.030   UBLD Small*   URINEPH 6.0   PROTEIN Negative   UROBILINOGEN 0.2   NITRITE Negative   LEUKEST Negative   RBCU 2-5*   WBCU O - 2   ,   Results for orders placed or performed during the hospital encounter of 11/17/19   Head CT w/o contrast    Narrative    EXAM: CT HEAD W/O CONTRAST  LOCATION: [facility]  DATE/TIME: 11/17/2019 7:46 PM    INDICATION: Trauma from fall.  COMPARISON: None.  TECHNIQUE: Routine without IV contrast. Multiplanar reformats. Dose reduction  techniques were used.    FINDINGS:  INTRACRANIAL CONTENTS: No intracranial hemorrhage, extraaxial collection, or mass effect. Pineal cyst. No CT evidence of acute infarct. Normal parenchymal attenuation. Normal ventricles and sulci.     VISUALIZED ORBITS/SINUSES/MASTOIDS: Bilateral drusen noted. No paranasal sinus mucosal disease. No middle ear or mastoid effusion.    BONES/SOFT TISSUES: No acute abnormality.      Impression    IMPRESSION:  1.  No acute intracranial injury.   MR Cervical Spine w/o Contrast    Narrative    EXAM: MR CERVICAL SPINE W/O CONTRAST  LOCATION: [facility]  DATE/TIME: 11/17/2019 5:54 PM    INDICATION: Trauma with neck pain.  COMPARISON: None.  TECHNIQUE: Without IV contrast.    FINDINGS:   There is good anatomic alignment of the cervical spine but there is a reversal of the normal cervical lordosis at the C6-C7 disc space level leading to kyphosis. The vertebral body heights are well-maintained throughout and have appropriate signal   characteristics for the patient's age. There is faint high signal seen within the cervical spinal cord on the right side at the C6 vertebral body level on the STIR images. There is no evidence of hemorrhage. There are two nodular masses within the   inferior parotid glands. The right one measures 1.5 cm x 1.3 cm and the left one measures approximately 1.4 cm x 0.6 cm. These could be nodular lesions within the parotid glands versus jugulodigastric lymph nodes. A routine CT scan of the neck with   contrast in the future may be helpful for further delineation. The ligamentous structures appear intact. There is mild edema on the STIR images within the right side of the posterior soft tissues from C6 through T1 and slightly within the interspinous   process ligaments at the C6-C7 disc space level.    Craniovertebral junction and C1-C2: Normal.    C2-C3: Normal disc height. No herniation. Normal facets. No spinal canal or neural foraminal stenosis.      C3-C4: Normal disc height. No herniation. Normal facets. No spinal canal or neural foraminal stenosis.     C4-C5: There is a slight decrease in disc space height and signal. There is a tiny central disc protrusion but no evidence of canal compromise. The neural foramen are patent bilaterally.     C5-C6: There is a slight loss of disc space height and signal. There is a broad central disc protrusion but no evidence of canal compromise. The neural foramen are patent bilaterally.     C6-C7: There is a moderate loss of disc space height and signal. There is a broad central to right paracentral disc extrusion extending inferiorly. This measures approximately 1.2 cm transversely by 0.6 cm anteriorly posteriorly by 0.7 cm cranial   caudally. This does lead to moderate canal compromise and mild narrowing of the origins of the neural foramen bilaterally. This extrusion may have a free fragment component.     C7-T1: Normal disc height. No herniation. Normal facets. No spinal canal or neural foraminal stenosis.      Impression    IMPRESSION:  1.  Reversal of normal cervical lordosis with kyphosis at the C6-C7 disc space level.  2.  At the C6-C7 disc space level there is central to right paracentral disc extrusion extending inferiorly leading to moderate canal compromise and mild narrowing of the origins of the neural foramen bilaterally.  3.  Possible early edema within right side of the cervical spinal cord at the C6 level.  4.  Ligamentous structures appear intact with mild edema in right side of the posterior soft tissues from C6 to T1.    [Critical Result: C6-C7 disc space level disc extrusion with possible free fragment leading to moderate canal compromise with edema visualized in the cervical spinal cord.]    Finding was identified on 11/17/2019 6:42 PM.     Dr. House was contacted by Dr. Mckeon on 11/17/2019 7:01 PM and verbalized understanding of the critical result.      Cervical spine CT w/o contrast     Narrative    EXAM: CT CERVICAL SPINE W/O CONTRAST  LOCATION: [facility]  DATE/TIME: 11/17/2019 7:46 PM    INDICATION: Neck pain after trauma.  COMPARISON: Cervical spine MRI 11/17/2019  TECHNIQUE: Routine without IV contrast. Multiplanar reformats. Dose reduction techniques were used.    FINDINGS:  Acute nondisplaced oblique fracture through the left superior C7 facet. Reversal of the usual cervical lordosis. Normal vertebral body heights. No subluxation. No prevertebral soft tissue swelling.      Impression    IMPRESSION:  1.  Acute nondisplaced fracture through the left superior C7 facet.    [Critical Result: Acute cervical spine fracture]    Finding was identified on 11/17/2019 8:29 PM.     Dr. House was contacted by me on 11/17/2019 8:42 PM and verbalized understanding of the critical result.                  Discharge Medications   Discharge Medication List as of 11/21/2019  2:35 PM      START taking these medications    Details   oxyCODONE-acetaminophen (PERCOCET) 5-325 MG tablet Take 1-2 tablets by mouth every 4 hours as needed for moderate to severe pain, Disp-30 tablet, R-0, E-Prescribe      tiZANidine (ZANAFLEX) 2 MG tablet Take 1-2 tablets (2-4 mg) by mouth 3 times daily as needed for muscle spasms, Disp-60 tablet, R-1, E-Prescribe         CONTINUE these medications which have NOT CHANGED    Details   famotidine (PEPCID) 10 MG tablet Take 10 mg by mouth 2 times daily as needed , Historical      levothyroxine (SYNTHROID/LEVOTHROID) 50 MCG tablet Take 1 tablet (50 mcg) by mouth daily, Disp-90 tablet, R-3, E-Prescribe      methylphenidate ER (CONCERTA) 36 MG CR tablet Take 1 tablet (36 mg) by mouth every morning, Disp-30 tablet, R-0, Local Print      order for DME SAD lightDisp-1 each, R-0, Local Print      PARoxetine (PAXIL) 40 MG tablet TAKE 1 TABLET BY MOUTH EVERYDAY AT BEDTIME, Disp-30 tablet, R-5, E-Prescribe      propranolol ER (INDERAL LA) 60 MG 24 hr capsule TAKE ONE CAPSULE BY MOUTH  EVERY DAY, Disp-90 capsule, R-3, E-Prescribe           Allergies   Allergies   Allergen Reactions     Sulfa Drugs Hives

## 2019-11-21 NOTE — PROGRESS NOTES
[facility]    Neurosurgery  Daily Post-Op Note    Assessment & Plan   Procedure(s):  ANTERIOR CERVICAL DISCECTOMY AT C6-7; CERVICAL ARTHROPLASTY   -1 Day Post-Op  Doing well.  Pain well-controlled.    Plan:  -Advance activity as tolerated  -Routine wound care  -she will remain in the collar given the associated fracture. F/u in 6 weeks with new xrays. Instructions discussed with patient.   -home today    Sanju Putnam    Interval History   Stable.  Doing well.  Improving slowly.  Pain is reasonably controlled.  No fevers.     Physical Exam   Temp: 98  F (36.7  C) Temp src: Oral BP: 102/53 Pulse: 57 Heart Rate: 60 Resp: 16 SpO2: 98 % O2 Device: None (Room air) Oxygen Delivery: 3 LPM  There were no vitals filed for this visit.  Vital Signs with Ranges  Temp:  [98  F (36.7  C)-100.8  F (38.2  C)] 98  F (36.7  C)  Pulse:  [57-87] 57  Heart Rate:  [50-84] 60  Resp:  [10-29] 16  BP: (102-135)/(44-84) 102/53  SpO2:  [96 %-100 %] 98 %  I/O last 3 completed shifts:  In: 1150 [P.O.:50; I.V.:1100]  Out: 15 [Blood:15]    Alert and oriented.  Moves all extremities equally.    Incision: CDI      Medications        famotidine  10 mg Oral BID     gabapentin  300 mg Oral TID     levothyroxine  50 mcg Oral Daily     PARoxetine  40 mg Oral Daily     propranolol ER  60 mg Oral Daily     scopolamine   Transdermal Once     sodium chloride (PF)  3 mL Intracatheter Q8H       Data         Sanju Putnam PA-C  [facility]  [address]  [address]  [place] [address]    Tel [phone]  Pager [phone]

## 2019-11-21 NOTE — PLAN OF CARE
Discharge Planner PT   Patient plan for discharge: Home with family support  Current status: PT eval and treatment completed. Pt is a 29 yr old female admitted with fall and fx of C6-C7 fx. Pt is now s/p Anterior cervical discectomy at C6-7 with decompression of ventral spinal cord POD 1. At baseline pt is ind with all ADL's, IADLs and ambulation. Pt is planning to stay with her parents in their split level home with no JOSE ALEJANDRO and has 7 steps to access the bed/bathroom.   Pt was received supine  in bed with Aspen brace on and mom at . Pt and mom is edu on C-spine precautions. Pt is independent with all aspects of bed mobility and independent with transfers. Independent with toileting. Pt ambulated 400 ft without assistive devices, CGA progressing to supervision. Pt went up/down 1 flight of stairs using 1-2 rail support and SBA.   Barriers to return to prior living situation: None anticipated.   Recommendations for discharge: Home with family's stand by assist on stairs for safety and with IADLs as needed due to C-spine precautions.   Rationale for recommendations: Pt has C-spine precautions limiting independence with certain IADL's (driving, lifting etx). Mom stated that she will be home to assist as needed.        Entered by: Yessica Locke 11/21/2019 9:15 AM     Physical Therapy Discharge Summary    Reason for therapy discharge:    Discharged to home.    Progress towards therapy goal(s). [facility] for goal details.  Goals met    Therapy recommendation(s):    No further therapy is recommended.

## 2019-11-21 NOTE — PLAN OF CARE
Pt returned from PACU around 1645. POD 0 from a cervical discectomy. A&Ox4. CMS intact ex numbness in BUE- improved per pt. Bowel sounds hypoactive, - flatus, tolerating Regular diet. VSS on RA. Dressing CDI.  Up with assist x1 and GB. C/o upper neck pain, decreased with percocet . Pt scoring green/yellow on the Aggression Stop Light Tool. Continue to monitor.

## 2019-11-22 ENCOUNTER — TELEPHONE (OUTPATIENT)
Dept: FAMILY MEDICINE | Facility: CLINIC | Age: 29
End: 2019-11-22

## 2019-11-22 NOTE — TELEPHONE ENCOUNTER
[facility]ER on 11/21/19 for Cervical Disc Disorder At C6-C7 Level With Myelopathy, S/P Cervical Disc Replacement  No future appointment made.  Corazon Patel

## 2019-11-22 NOTE — TELEPHONE ENCOUNTER
ED / Discharge Outreach Protocol    Patient Contact    Attempt # 1    Was call answered?  No.  Left message on voicemail with information to call me back.    Vick Patterson RN, BSN    Follow-up Appointments     Follow-up and recommended labs and tests       6 weeks with NSG. [phone]

## 2019-11-25 NOTE — TELEPHONE ENCOUNTER
ED / Discharge Outreach Protocol    Patient Contact    Attempt # 2    Was call answered?  No.  Left message on voicemail with information to call me back.    Vick Patterson RN, BSN

## 2019-11-26 ENCOUNTER — DOCUMENTATION ONLY (OUTPATIENT)
Dept: NEUROSURGERY | Facility: CLINIC | Age: 29
End: 2019-11-26

## 2019-11-26 NOTE — PROGRESS NOTES
11/26/2019    [facility] Forms: yes    Faxed: [phone]     Type of form: Completed forms for [facility] and faxed to them at the number listed above.     Placed a copy in the bin and sent the original to medical records

## 2019-11-26 NOTE — TELEPHONE ENCOUNTER
ED / Discharge Outreach Protocol    Patient Contact    Attempt # 3    Was call answered?  No.  Left message on voicemail with information to call me back.    ,Katy Armando RN

## 2019-12-03 ENCOUNTER — OFFICE VISIT (OUTPATIENT)
Dept: FAMILY MEDICINE | Facility: CLINIC | Age: 29
End: 2019-12-03
Payer: COMMERCIAL

## 2019-12-03 VITALS
WEIGHT: 180 LBS | HEART RATE: 75 BPM | HEIGHT: 65 IN | RESPIRATION RATE: 16 BRPM | DIASTOLIC BLOOD PRESSURE: 70 MMHG | OXYGEN SATURATION: 99 % | TEMPERATURE: 99.2 F | BODY MASS INDEX: 29.99 KG/M2 | SYSTOLIC BLOOD PRESSURE: 112 MMHG

## 2019-12-03 DIAGNOSIS — F41.9 ANXIETY: ICD-10-CM

## 2019-12-03 DIAGNOSIS — Z98.890 S/P CERVICAL DISC REPLACEMENT: ICD-10-CM

## 2019-12-03 DIAGNOSIS — R11.0 NAUSEA: Primary | ICD-10-CM

## 2019-12-03 DIAGNOSIS — F40.10 SOCIAL PHOBIA: ICD-10-CM

## 2019-12-03 DIAGNOSIS — F33.41 RECURRENT MAJOR DEPRESSIVE DISORDER, IN PARTIAL REMISSION (H): ICD-10-CM

## 2019-12-03 PROCEDURE — 99214 OFFICE O/P EST MOD 30 MIN: CPT | Performed by: PHYSICIAN ASSISTANT

## 2019-12-03 RX ORDER — PAROXETINE 40 MG/1
40 TABLET, FILM COATED ORAL EVERY MORNING
Qty: 90 TABLET | Refills: 1 | Status: SHIPPED | OUTPATIENT
Start: 2019-12-03 | End: 2020-06-24

## 2019-12-03 RX ORDER — NAPROXEN 500 MG/1
500 TABLET ORAL 2 TIMES DAILY WITH MEALS
Qty: 30 TABLET | Refills: 0 | Status: SHIPPED | OUTPATIENT
Start: 2019-12-03 | End: 2021-02-17

## 2019-12-03 RX ORDER — SCOLOPAMINE TRANSDERMAL SYSTEM 1 MG/1
1 PATCH, EXTENDED RELEASE TRANSDERMAL
Qty: 3 PATCH | Refills: 1 | Status: SHIPPED | OUTPATIENT
Start: 2019-12-03 | End: 2021-02-17

## 2019-12-03 RX ORDER — ONDANSETRON 8 MG/1
8 TABLET, FILM COATED ORAL EVERY 8 HOURS PRN
Qty: 20 TABLET | Refills: 0 | Status: SHIPPED | OUTPATIENT
Start: 2019-12-03 | End: 2021-02-17

## 2019-12-03 RX ORDER — OXYCODONE AND ACETAMINOPHEN 5; 325 MG/1; MG/1
1-2 TABLET ORAL EVERY 4 HOURS PRN
Qty: 20 TABLET | Refills: 0 | Status: SHIPPED | OUTPATIENT
Start: 2019-12-03 | End: 2021-02-17

## 2019-12-03 ASSESSMENT — MIFFLIN-ST. JEOR: SCORE: 1537.35
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
## 2019-12-03 NOTE — PATIENT INSTRUCTIONS
(R11.0) Nausea  (primary encounter diagnosis)  Comment:   Plan: scopolamine (TRANSDERM) 1 MG/3DAYS 72 hr patch,        ondansetron (ZOFRAN) 8 MG tablet            (Z98.890) S/P cervical disc replacement  Comment:   Plan: oxyCODONE-acetaminophen (PERCOCET) 5-325 MG         tablet, naproxen (NAPROSYN) 500 MG tablet            Well refill percocet for 20 tabs.  encouraged no more than 2 percocet daily and add naprosyn 1 tablet twice daily x 2 weeks

## 2019-12-10 ENCOUNTER — PATIENT OUTREACH (OUTPATIENT)
Dept: CARE COORDINATION | Facility: CLINIC | Age: 29
End: 2019-12-10

## 2019-12-10 DIAGNOSIS — M50.023 CERVICAL DISC DISORDER AT C6-C7 LEVEL WITH MYELOPATHY: Primary | ICD-10-CM

## 2019-12-10 NOTE — LETTER
"[place] CARE COORDINATION  [address]  [facility] [address]    December 19, 2019    Gayathri Iesharandall  [address] [facility] [address]      Dear Gayathri,    I am a clinical product navigator that works on behalf of [employer]; I wanted to introduce myself and role to you, as I can help you establish care with recommended providers for ongoing care within our health care system.     Also, our Primary Care Clinics are all supported by Clinic Care Coordination:     \"The clinic care coordinator is a registered nurse and/or  who understand the health care system. The goal of clinic care coordination is to help you manage your health and improve access to the [facility] system in the most efficient manner. The registered nurse can assist you in meeting your health care goals by providing education, coordinating services, and strengthening the communication among your providers. The  can assist you with financial, behavioral, psychosocial, chemical dependency, counseling, and/or psychiatric resources.\"    I look forward to helping you connect with providers within our health care system; please let me know if you have any questions.     Sincerely,    Estela Henry RN   Clinical Product Navigator  PH: [phone]        "

## 2019-12-11 NOTE — PROGRESS NOTES
Clinic Care Coordination Contact    Clinical Product Navigator RN reviewed chart; patient on payer product coverage.  Review results: Met referral criteria for Care Coordinator; referral initiated by Clinical Product Navigator.       [facility]/Voicemail    Referral Source: Health Plan  Clinical Data: Clinical Product Naviagtor Outreach  Outreach attempted x 1.  Left message on patient's voicemail with call back information and requested return call.   Care Coordinator will try to reach patient again in 1-2 business days.      Estela Henry RN/Clinical Product Navigator

## 2019-12-16 ENCOUNTER — OFFICE VISIT (OUTPATIENT)
Dept: FAMILY MEDICINE | Facility: CLINIC | Age: 29
End: 2019-12-16
Payer: COMMERCIAL

## 2019-12-16 VITALS
BODY MASS INDEX: 29.95 KG/M2 | SYSTOLIC BLOOD PRESSURE: 102 MMHG | HEIGHT: 65 IN | HEART RATE: 63 BPM | TEMPERATURE: 98.8 F | OXYGEN SATURATION: 98 % | DIASTOLIC BLOOD PRESSURE: 60 MMHG

## 2019-12-16 DIAGNOSIS — J06.9 VIRAL UPPER RESPIRATORY TRACT INFECTION: Primary | ICD-10-CM

## 2019-12-16 DIAGNOSIS — Z98.890 S/P CERVICAL DISC REPLACEMENT: ICD-10-CM

## 2019-12-16 PROCEDURE — 99213 OFFICE O/P EST LOW 20 MIN: CPT | Performed by: PHYSICIAN ASSISTANT

## 2019-12-16 RX ORDER — NORGESTIMATE AND ETHINYL ESTRADIOL 7DAYSX3 28
1 KIT ORAL DAILY
COMMUNITY
End: 2020-07-29 | Stop reason: ALTCHOICE

## 2019-12-16 ASSESSMENT — ANXIETY QUESTIONNAIRES
5. BEING SO RESTLESS THAT IT IS HARD TO SIT STILL: NOT AT ALL
6. BECOMING EASILY ANNOYED OR IRRITABLE: MORE THAN HALF THE DAYS
3. WORRYING TOO MUCH ABOUT DIFFERENT THINGS: MORE THAN HALF THE DAYS
IF YOU CHECKED OFF ANY PROBLEMS ON THIS QUESTIONNAIRE, HOW DIFFICULT HAVE THESE PROBLEMS MADE IT FOR YOU TO DO YOUR WORK, TAKE CARE OF THINGS AT HOME, OR GET ALONG WITH OTHER PEOPLE: SOMEWHAT DIFFICULT
7. FEELING AFRAID AS IF SOMETHING AWFUL MIGHT HAPPEN: SEVERAL DAYS
1. FEELING NERVOUS, ANXIOUS, OR ON EDGE: NEARLY EVERY DAY
2. NOT BEING ABLE TO STOP OR CONTROL WORRYING: MORE THAN HALF THE DAYS
GAD7 TOTAL SCORE: 12

## 2019-12-16 ASSESSMENT — PATIENT HEALTH QUESTIONNAIRE - PHQ9
5. POOR APPETITE OR OVEREATING: MORE THAN HALF THE DAYS
SUM OF ALL RESPONSES TO PHQ QUESTIONS 1-9: 9

## 2019-12-16 NOTE — PROGRESS NOTES
Subjective     Gayathri Gomez is a 29 year old female who presents to clinic today for the following health issues:    HPI   Medication check: fractured neck and was in a great deal of pain at last visit on 12/3/19.  States doing well now. Pain better since she started taking aleve.  Takes 1 percocet at night to sleep.     Also, she has additional complaints of swollen lymph nosed in neck .        Current Outpatient Medications   Medication Sig Dispense Refill     famotidine (PEPCID) 10 MG tablet Take 10 mg by mouth 2 times daily as needed        levothyroxine (SYNTHROID/LEVOTHROID) 50 MCG tablet Take 1 tablet (50 mcg) by mouth daily 90 tablet 3     naproxen (NAPROSYN) 500 MG tablet Take 1 tablet (500 mg) by mouth 2 times daily (with meals) 30 tablet 0     norgestim-eth estrad triphasic (ORTHO TRI-CYCLEN/TRI-SPRINTEC) 0.18/0.215/0.25 MG-35 MCG tablet Take 1 tablet by mouth daily       ondansetron (ZOFRAN) 8 MG tablet Take 1 tablet (8 mg) by mouth every 8 hours as needed for nausea 20 tablet 0     order for DME SAD light 1 each 0     oxyCODONE-acetaminophen (PERCOCET) 5-325 MG tablet Take 1-2 tablets by mouth every 4 hours as needed for moderate to severe pain 20 tablet 0     PARoxetine (PAXIL) 40 MG tablet Take 1 tablet (40 mg) by mouth every morning 90 tablet 1     propranolol ER (INDERAL LA) 60 MG 24 hr capsule TAKE ONE CAPSULE BY MOUTH EVERY DAY 90 capsule 3     scopolamine (TRANSDERM) 1 MG/3DAYS 72 hr patch Place 1 patch onto the skin every 72 hours 3 patch 1     tiZANidine (ZANAFLEX) 2 MG tablet Take 1-2 tablets (2-4 mg) by mouth 3 times daily as needed for muscle spasms 60 tablet 1     methylphenidate ER (CONCERTA) 36 MG CR tablet Take 1 tablet (36 mg) by mouth every morning (Patient not taking: Reported on 12/3/2019) 30 tablet 0     Recent Labs   Lab Test 11/18/19  0824 11/17/19  1741 08/05/19  0856 04/29/19  0846 04/08/19  0905  06/26/17  0916   LDL  --   --   --   --  149*  --  152*   HDL  --   --   --   --   "68  --  68   TRIG  --   --   --   --  55  --  131   ALT  --   --   --   --  22  --   --    CR 0.69 0.87  --   --  0.72   < >  --    GFRESTIMATED >90 89  --   --  >90   < >  --    GFRESTBLACK >90 >90  --   --  >90   < >  --    POTASSIUM 3.6 3.5  --   --  3.1*   < >  --    TSH  --   --  2.36 3.90  --   --   --     < > = values in this interval not displayed.      BP Readings from Last 3 Encounters:   12/16/19 102/60   12/03/19 112/70   11/21/19 104/56    Wt Readings from Last 3 Encounters:   12/03/19 81.6 kg (180 lb)   11/17/19 81.6 kg (180 lb)   04/16/18 87.1 kg (192 lb)                      Reviewed and updated as needed this visit by Provider         Review of Systems   ROS COMP: Constitutional, HEENT, cardiovascular, pulmonary, gi and gu systems are negative, except as otherwise noted.      Objective    /60 (BP Location: Right arm, Patient Position: Chair, Cuff Size: Adult Large)   Pulse 63   Temp 98.8  F (37.1  C) (Oral)   Ht 1.651 m (5' 5\")   SpO2 98%   BMI 29.95 kg/m    Body mass index is 29.95 kg/m .  Physical Exam   GENERAL: healthy, alert and no distress  HENT: ear canals and TM's normal, nose and mouth without ulcers or lesions  RESP: lungs clear to auscultation - no rales, rhonchi or wheezes  CV: regular rate and rhythm, normal S1 S2, no S3 or S4, no murmur, click or rub, no peripheral edema and peripheral pulses strong  SKIN: no suspicious lesions or rashes  LYMPH: no cervical, supraclavicular, axillary, or inguinal adenopathy    Diagnostic Test Results:  Labs reviewed in Epic        Assessment & Plan     1. Viral upper respiratory tract infection  The patient is advised to push fluids, rest, gargle warm salt water, use vaporizer or mist needed  and Return office visit if symptoms persist or worsen.      2. S/P cervical disc replacement  Stable and doing well   Wearing neck brace             No follow-ups on file.    Beckiona Ann Aaseby-Aguilera, PA-C  [facility]"

## 2019-12-17 ASSESSMENT — ANXIETY QUESTIONNAIRES: GAD7 TOTAL SCORE: 12

## 2019-12-19 NOTE — PROGRESS NOTES
Clinic Care Coordination Contact  [facility]/Voicemail    Referral Source: Health Plan  Clinical Data: Care Coordinator Outreach  Outreach attempted x 2.  Left message on patient's voicemail with call back information and requested return call.  Plan: Care Coordinator will send care coordination introduction letter with care coordinator contact information and explanation of care coordination services via mail. Care Coordinator will do no further outreaches at this time.    Estela Henry RN   Clinic Care Coordinator-[facility]  PH: [phone]

## 2019-12-23 ENCOUNTER — OFFICE VISIT (OUTPATIENT)
Dept: NEUROSURGERY | Facility: CLINIC | Age: 29
End: 2019-12-23
Attending: PHYSICIAN ASSISTANT
Payer: COMMERCIAL

## 2019-12-23 ENCOUNTER — APPOINTMENT (OUTPATIENT)
Dept: GENERAL RADIOLOGY | Facility: CLINIC | Age: 29
End: 2019-12-23
Attending: PHYSICIAN ASSISTANT
Payer: COMMERCIAL

## 2019-12-23 VITALS
OXYGEN SATURATION: 100 % | TEMPERATURE: 98.1 F | SYSTOLIC BLOOD PRESSURE: 131 MMHG | BODY MASS INDEX: 29.99 KG/M2 | WEIGHT: 180 LBS | HEIGHT: 65 IN | DIASTOLIC BLOOD PRESSURE: 87 MMHG | HEART RATE: 78 BPM

## 2019-12-23 DIAGNOSIS — M50.023 CERVICAL DISC DISORDER AT C6-C7 LEVEL WITH MYELOPATHY: Primary | ICD-10-CM

## 2019-12-23 DIAGNOSIS — M50.023 CERVICAL DISC DISORDER AT C6-C7 LEVEL WITH MYELOPATHY: ICD-10-CM

## 2019-12-23 PROCEDURE — 99024 POSTOP FOLLOW-UP VISIT: CPT | Performed by: PHYSICIAN ASSISTANT

## 2019-12-23 PROCEDURE — 72040 X-RAY EXAM NECK SPINE 2-3 VW: CPT

## 2019-12-23 PROCEDURE — G0463 HOSPITAL OUTPT CLINIC VISIT: HCPCS

## 2019-12-23 ASSESSMENT — MIFFLIN-ST. JEOR: SCORE: 1537.35

## 2019-12-23 ASSESSMENT — PAIN SCALES - GENERAL: PAINLEVEL: MILD PAIN (3)

## 2019-12-23 NOTE — LETTER
12/23/2019         RE: Gayathri Gomez  [address] [address] [address]        Dear Colleague,    Thank you for referring your patient, Gayathri Gomez, to the [facility] NEUROSURGERY CLINIC. Please see a copy of my visit note below.    Neurosurgery ACD Arthroplasty 6-week follow-up    Ms. Gomez is a 29-year-old female 6 weeks status post C6-7 anterior cervical discectomy and placement of artificial disc for large central and right-sided disc herniation producing spinal cord impingement and central cord syndrome.  Patient states that her hands are feeling better but she still has abnormal sensation in her forearms and into her hands.  She denies any lower extremity symptoms.  She denies significant neck pain.  She has been wearing her neck brace due to a fracture through the left superior C7 facet.      Exam    Alert and oriented no acute distress  Bilateral upper extremities with 5 5 strength  Reflexes 2+ triceps biceps brachioradialis  Lenka sign positive bilaterally  Reflexes 2+ patella and ankle  Positive ankle clonus bilaterally  Negative Babinski bilaterally  Cervical incision is well-healed    Imaging    Cervical x-rays demonstrate stable positioning of the artificial disc at C6-7.  No flexion and extension images were obtained.    Assessment    29-year-old female next week status post C6-7 anterior cervical discectomy and placement Prestige LP to artificial disc also with a supra left superior C7 facet fracture, and central cord syndrome.      Plan    Patient was sent for cervical flexion extension x-rays however these were not performed and only a standard AP and lateral view was obtained.  This x-ray looks stable and with appropriate placement.  We will have the patient return for another set of flexion-extension x-rays to better evaluate if there is any instability or issue with the prior set fracture that the patient had and once we have these images will be able to better decide if  she can come out of her cervical collar.  We will follow-up with the patient after her next set of x-rays.        Again, thank you for allowing me to participate in the care of your patient.        Sincerely,        Casimiro Sheikh PA-C

## 2019-12-23 NOTE — PROGRESS NOTES
Neurosurgery ACD Arthroplasty 6-week follow-up    Ms. Gomez is a 29-year-old female 6 weeks status post C6-7 anterior cervical discectomy and placement of artificial disc for large central and right-sided disc herniation producing spinal cord impingement and central cord syndrome.  Patient states that her hands are feeling better but she still has abnormal sensation in her forearms and into her hands.  She denies any lower extremity symptoms.  She denies significant neck pain.  She has been wearing her neck brace due to a fracture through the left superior C7 facet.      Exam    Alert and oriented no acute distress  Bilateral upper extremities with 5 5 strength  Reflexes 2+ triceps biceps brachioradialis  Lenka sign positive bilaterally  Reflexes 2+ patella and ankle  Positive ankle clonus bilaterally  Negative Babinski bilaterally  Cervical incision is well-healed    Imaging    Cervical x-rays demonstrate stable positioning of the artificial disc at C6-7.  No flexion and extension images were obtained.    Assessment    29-year-old female next week status post C6-7 anterior cervical discectomy and placement Prestige LP to artificial disc also with a supra left superior C7 facet fracture, and central cord syndrome.      Plan    Patient was sent for cervical flexion extension x-rays however these were not performed and only a standard AP and lateral view was obtained.  This x-ray looks stable and with appropriate placement.  We will have the patient return for another set of flexion-extension x-rays to better evaluate if there is any instability or issue with the prior set fracture that the patient had and once we have these images will be able to better decide if she can come out of her cervical collar.  We will follow-up with the patient after her next set of x-rays.

## 2019-12-23 NOTE — NURSING NOTE
"Gayathri Gomez is a 29 year old female who presents for:  Chief Complaint   Patient presents with     Surgical Followup     6 week follow up s/p 11/20/19 C6-7 disc replacement.         Initial Vitals:  /87 (BP Location: Right arm, Patient Position: Sitting, Cuff Size: Adult Large)   Pulse 78   Temp 98.1  F (36.7  C) (Oral)   Ht 5' 5\" (1.651 m)   Wt 180 lb (81.6 kg)   SpO2 100%   BMI 29.95 kg/m   Estimated body mass index is 29.95 kg/m  as calculated from the following:    Height as of this encounter: 5' 5\" (1.651 m).    Weight as of this encounter: 180 lb (81.6 kg).. Body surface area is 1.93 meters squared. BP completed using cuff size: large  Mild Pain (3)        Nursing Comments: Patient states her concern is how much pain she will have without the collar.         Aranza Delgado, KAREN    "

## 2019-12-26 ENCOUNTER — TELEPHONE (OUTPATIENT)
Dept: NEUROSURGERY | Facility: CLINIC | Age: 29
End: 2019-12-26

## 2019-12-26 DIAGNOSIS — M50.023 CERVICAL DISC DISORDER AT C6-C7 LEVEL WITH MYELOPATHY: Primary | ICD-10-CM

## 2019-12-26 NOTE — TELEPHONE ENCOUNTER
Per Casimiro Sheikh PA-C;  Please let her know that her neck xrays look good, however xray clinic did not do the flexion and extension films I ordered. Please send her for a set for cervical lateral flexion and extension xrays only and have her follow in clinic afterwards. Dx s/p cervical arthroplasty. Called and spoke to patient. New orders placed for xrays. Routed patient to scheduling to make appt for imaging and f/u with Casimiro.

## 2020-01-06 ENCOUNTER — OFFICE VISIT (OUTPATIENT)
Dept: NEUROSURGERY | Facility: CLINIC | Age: 30
End: 2020-01-06
Attending: PHYSICIAN ASSISTANT
Payer: COMMERCIAL

## 2020-01-06 ENCOUNTER — TELEPHONE (OUTPATIENT)
Dept: NEUROSURGERY | Facility: CLINIC | Age: 30
End: 2020-01-06

## 2020-01-06 ENCOUNTER — HOSPITAL ENCOUNTER (OUTPATIENT)
Dept: GENERAL RADIOLOGY | Facility: CLINIC | Age: 30
Discharge: HOME | End: 2020-01-06
Attending: PHYSICIAN ASSISTANT | Admitting: PHYSICIAN ASSISTANT
Payer: COMMERCIAL

## 2020-01-06 VITALS
DIASTOLIC BLOOD PRESSURE: 74 MMHG | BODY MASS INDEX: 29.95 KG/M2 | OXYGEN SATURATION: 99 % | SYSTOLIC BLOOD PRESSURE: 106 MMHG | TEMPERATURE: 98.1 F | HEIGHT: 65 IN | HEART RATE: 65 BPM | RESPIRATION RATE: 16 BRPM

## 2020-01-06 DIAGNOSIS — M50.023 CERVICAL DISC DISORDER AT C6-C7 LEVEL WITH MYELOPATHY: Primary | ICD-10-CM

## 2020-01-06 DIAGNOSIS — M50.023 CERVICAL DISC DISORDER AT C6-C7 LEVEL WITH MYELOPATHY: ICD-10-CM

## 2020-01-06 PROCEDURE — 99024 POSTOP FOLLOW-UP VISIT: CPT | Performed by: PHYSICIAN ASSISTANT

## 2020-01-06 PROCEDURE — 72040 X-RAY EXAM NECK SPINE 2-3 VW: CPT

## 2020-01-06 PROCEDURE — G0463 HOSPITAL OUTPT CLINIC VISIT: HCPCS

## 2020-01-06 ASSESSMENT — PAIN SCALES - GENERAL: PAINLEVEL: MODERATE PAIN (5)

## 2020-01-06 NOTE — NURSING NOTE
"Gayathri Gomez is a 29 year old female who presents for:  Chief Complaint   Patient presents with     Follow Up        Initial Vitals:  /74   Pulse 65   Temp 98.1  F (36.7  C) (Oral)   Resp 16   Ht 5' 5\" (1.651 m)   SpO2 99%   BMI 29.95 kg/m   Estimated body mass index is 29.95 kg/m  as calculated from the following:    Height as of this encounter: 5' 5\" (1.651 m).    Weight as of 12/23/19: 180 lb (81.6 kg).. Body surface area is 1.93 meters squared. BP completed using cuff size: regular  Moderate Pain (5)    Nursing Comments: Pt present today for follow up.    Hira Graf CMA    "

## 2020-01-06 NOTE — PROGRESS NOTES
Work Note    Ms. Gomez had surgery 11/20/2019 and is cleared to return to work without restrictions on 1/13/2020.    Casimiro Sheikh PA-C  [facility]

## 2020-01-06 NOTE — LETTER
1/6/2020         RE: Gayathri Gomez  [address] [address]  [place] [address]        Dear Colleague,    Thank you for referring your patient, Gayathri Gomez, to the [facility] NEUROSURGERY CLINIC. Please see a copy of my visit note below.    Work Note    Ms. Gomez had surgery 11/20/2019 and is cleared to return to work without restrictions on 1/13/2020.    Casimiro Sheikh PA-C  [facility]       Neurosurgery ACD Arthroplasty  follow-up    Ms. Gomez is a 29-year-old female 6 weeks status post C6-7 anterior cervical discectomy and placement of artificial disc for large central and right-sided disc herniation producing spinal cord impingement and central cord syndrome.  Patient states that her hands are feeling better but she still has abnormal sensation in her forearms and into her hands.  She denies any lower extremity symptoms.  She denies significant neck pain.  She has been wearing her neck brace due to a fracture through the left superior C7 facet.    At her last visit cervical xray were obtained, we also needed a set of flexion and extension xrays which she had done today and is here to review.       Exam    Alert and oriented no acute distress  Bilateral upper extremities with 5 5 strength  Reflexes 2+ triceps biceps brachioradialis  Lenka sign positive bilaterally  Reflexes 2+ patella and ankle  Positive ankle clonus bilaterally  Negative Babinski bilaterally  Cervical incision is well-healed    Imaging    Flexion extension Cervical x-rays demonstrate stable positioning of the artificial disc at C6-7. No subluxation. Fracute is not well seen in the superior left C7 facet.   Assessment    29-year-old female next week status post C6-7 anterior cervical discectomy and placement Prestige LP to artificial disc also with a superior left superior C7 facet fracture, and central cord syndrome.      Plan    Patient may return to work without restrictions.  She may stop wearing her cervical  lata.  No further follow up is needed.     Imaging reviewed with Dr. Thacker            Again, thank you for allowing me to participate in the care of your patient.        Sincerely,        Casimiro Sheikh PA-C

## 2020-01-06 NOTE — TELEPHONE ENCOUNTER
"LOV 01/06/19 with Casimiro BERGMAN.  Per Casimiro, \"reviewed her imaging with Dr. Thacker and she can stop wearing her brace and return to work without restrictions as we discussed. No follow up needed.\"  Called to relay to patient. Voicemail left.  "

## 2020-01-06 NOTE — PROGRESS NOTES
Neurosurgery ACD Arthroplasty  follow-up    Ms. Gomez is a 29-year-old female 6 weeks status post C6-7 anterior cervical discectomy and placement of artificial disc for large central and right-sided disc herniation producing spinal cord impingement and central cord syndrome.  Patient states that her hands are feeling better but she still has abnormal sensation in her forearms and into her hands.  She denies any lower extremity symptoms.  She denies significant neck pain.  She has been wearing her neck brace due to a fracture through the left superior C7 facet.    At her last visit cervical xray were obtained, we also needed a set of flexion and extension xrays which she had done today and is here to review.       Exam    Alert and oriented no acute distress  Bilateral upper extremities with 5 5 strength  Reflexes 2+ triceps biceps brachioradialis  Lenka sign positive bilaterally  Reflexes 2+ patella and ankle  Positive ankle clonus bilaterally  Negative Babinski bilaterally  Cervical incision is well-healed    Imaging    Flexion extension Cervical x-rays demonstrate stable positioning of the artificial disc at C6-7. No subluxation. Fracute is not well seen in the superior left C7 facet.   Assessment    29-year-old female next week status post C6-7 anterior cervical discectomy and placement Prestige LP to artificial disc also with a superior left superior C7 facet fracture, and central cord syndrome.      Plan    Patient may return to work without restrictions.  She may stop wearing her cervical collar.  No further follow up is needed.     Imaging reviewed with Dr. Thacker

## 2020-01-10 ENCOUNTER — TELEPHONE (OUTPATIENT)
Dept: FAMILY MEDICINE | Facility: CLINIC | Age: 30
End: 2020-01-10

## 2020-01-10 ENCOUNTER — HOSPITAL ENCOUNTER (OUTPATIENT)
Dept: ULTRASOUND IMAGING | Facility: CLINIC | Age: 30
Discharge: HOME | End: 2020-01-10
Attending: PHYSICIAN ASSISTANT | Admitting: PHYSICIAN ASSISTANT
Payer: COMMERCIAL

## 2020-01-10 ENCOUNTER — OFFICE VISIT (OUTPATIENT)
Dept: FAMILY MEDICINE | Facility: CLINIC | Age: 30
End: 2020-01-10
Payer: COMMERCIAL

## 2020-01-10 VITALS
DIASTOLIC BLOOD PRESSURE: 60 MMHG | HEART RATE: 76 BPM | HEIGHT: 65 IN | OXYGEN SATURATION: 98 % | BODY MASS INDEX: 29.95 KG/M2 | SYSTOLIC BLOOD PRESSURE: 103 MMHG | TEMPERATURE: 98.1 F

## 2020-01-10 DIAGNOSIS — F90.9 ATTENTION DEFICIT HYPERACTIVITY DISORDER (ADHD), UNSPECIFIED ADHD TYPE: ICD-10-CM

## 2020-01-10 DIAGNOSIS — M79.601 PAIN OF RIGHT UPPER EXTREMITY: ICD-10-CM

## 2020-01-10 DIAGNOSIS — M79.601 PAIN OF RIGHT UPPER EXTREMITY: Primary | ICD-10-CM

## 2020-01-10 PROCEDURE — 93971 EXTREMITY STUDY: CPT | Mod: RT

## 2020-01-10 PROCEDURE — 99214 OFFICE O/P EST MOD 30 MIN: CPT | Performed by: PHYSICIAN ASSISTANT

## 2020-01-10 RX ORDER — METHYLPHENIDATE HYDROCHLORIDE 36 MG/1
36 TABLET ORAL EVERY MORNING
Qty: 30 TABLET | Refills: 0 | Status: SHIPPED | OUTPATIENT
Start: 2020-01-10 | End: 2020-02-21

## 2020-01-10 NOTE — TELEPHONE ENCOUNTER
Alisha from [facility] calling with some questions in regards to the order Becki put in. She wanted to know if the ultrasound was for both arms or just the right arm. Please call Alisha at [phone].    Lulu Trejo,

## 2020-01-10 NOTE — PROGRESS NOTES
Subjective     Gayathri Gomez is a 29 year old female who presents to clinic today for the following health issues:    HPI   Medication Followup of concerta     Taking Medication as prescribed: NO - was off because of the surgery      Side Effects:  None    Medication Helping Symptoms:  yes     Concern - dvt in right arm - had IV in November   Onset: last Friday     Description:   Concern it may be a blood clot    Intensity:     Progression of Symptoms:  improving    Accompanying Signs & Symptoms:      Previous history of similar problem:       Precipitating factors:   Worsened by:     Alleviating factors:  Improved by:     Therapies Tried and outcome:     Current Outpatient Medications   Medication Sig Dispense Refill     famotidine (PEPCID) 10 MG tablet Take 10 mg by mouth 2 times daily as needed        levothyroxine (SYNTHROID/LEVOTHROID) 50 MCG tablet Take 1 tablet (50 mcg) by mouth daily 90 tablet 3     methylphenidate (CONCERTA) 36 MG CR tablet Take 1 tablet (36 mg) by mouth every morning 30 tablet 0     norgestim-eth estrad triphasic (ORTHO TRI-CYCLEN/TRI-SPRINTEC) 0.18/0.215/0.25 MG-35 MCG tablet Take 1 tablet by mouth daily       order for DME SAD light 1 each 0     PARoxetine (PAXIL) 40 MG tablet Take 1 tablet (40 mg) by mouth every morning 90 tablet 1     propranolol ER (INDERAL LA) 60 MG 24 hr capsule TAKE ONE CAPSULE BY MOUTH EVERY DAY 90 capsule 3     naproxen (NAPROSYN) 500 MG tablet Take 1 tablet (500 mg) by mouth 2 times daily (with meals) (Patient not taking: Reported on 1/10/2020) 30 tablet 0     ondansetron (ZOFRAN) 8 MG tablet Take 1 tablet (8 mg) by mouth every 8 hours as needed for nausea (Patient not taking: Reported on 1/10/2020) 20 tablet 0     oxyCODONE-acetaminophen (PERCOCET) 5-325 MG tablet Take 1-2 tablets by mouth every 4 hours as needed for moderate to severe pain (Patient not taking: Reported on 1/10/2020) 20 tablet 0     scopolamine (TRANSDERM) 1 MG/3DAYS 72 hr patch Place 1 patch  "onto the skin every 72 hours (Patient not taking: Reported on 12/23/2019) 3 patch 1     tiZANidine (ZANAFLEX) 2 MG tablet Take 1-2 tablets (2-4 mg) by mouth 3 times daily as needed for muscle spasms (Patient not taking: Reported on 1/10/2020) 60 tablet 1     Recent Labs   Lab Test 11/18/19  0824 11/17/19  1741 08/05/19  0856 04/29/19  0846 04/08/19  0905  06/26/17  0916   LDL  --   --   --   --  149*  --  152*   HDL  --   --   --   --  68  --  68   TRIG  --   --   --   --  55  --  131   ALT  --   --   --   --  22  --   --    CR 0.69 0.87  --   --  0.72   < >  --    GFRESTIMATED >90 89  --   --  >90   < >  --    GFRESTBLACK >90 >90  --   --  >90   < >  --    POTASSIUM 3.6 3.5  --   --  3.1*   < >  --    TSH  --   --  2.36 3.90  --   --   --     < > = values in this interval not displayed.      BP Readings from Last 3 Encounters:   01/10/20 103/60   01/06/20 106/74   12/23/19 131/87    Wt Readings from Last 3 Encounters:   12/23/19 81.6 kg (180 lb)   12/03/19 81.6 kg (180 lb)   11/17/19 81.6 kg (180 lb)                      Reviewed and updated as needed this visit by Provider         Review of Systems   ROS COMP: Constitutional, HEENT, cardiovascular, pulmonary, GI, , musculoskeletal, neuro, skin, endocrine and psych systems are negative, except as otherwise noted.      Objective    /60 (BP Location: Right arm, Patient Position: Chair, Cuff Size: Adult Large)   Pulse 76   Temp 98.1  F (36.7  C) (Oral)   Ht 1.651 m (5' 5\")   SpO2 98%   BMI 29.95 kg/m    Body mass index is 29.95 kg/m .  Physical Exam   GENERAL: healthy, alert and no distress  RESP: lungs clear to auscultation - no rales, rhonchi or wheezes  CV: regular rate and rhythm, normal S1 S2, no S3 or S4, no murmur, click or rub, no peripheral edema and peripheral pulses strong  MS: right forearm: 4 cm long ropey mass and TTP  BACK: no CVA tenderness, no paralumbar tenderness    Diagnostic Test Results:  Labs reviewed in Epic        Assessment & " Plan     1. Pain of right upper extremity  Well need to exclude a DVT.  Wears compression hosed over bilateral arms .Will contact patient with results when known and determine plan  - US Upper Ext Venous Duplex Limited Bilat; Future    2. Attention deficit hyperactivity disorder (ADHD), unspecified ADHD type    - methylphenidate (CONCERTA) 36 MG CR tablet; Take 1 tablet (36 mg) by mouth every morning  Dispense: 30 tablet; Refill: 0       There are no Patient Instructions on file for this visit.    No follow-ups on file.    Ramona Ann Aaseby-Aguilera, PA-C  [facility]

## 2020-02-21 ENCOUNTER — MYC REFILL (OUTPATIENT)
Dept: FAMILY MEDICINE | Facility: CLINIC | Age: 30
End: 2020-02-21

## 2020-02-21 DIAGNOSIS — F90.9 ATTENTION DEFICIT HYPERACTIVITY DISORDER (ADHD), UNSPECIFIED ADHD TYPE: ICD-10-CM

## 2020-02-21 RX ORDER — METHYLPHENIDATE HYDROCHLORIDE 36 MG/1
36 TABLET ORAL EVERY MORNING
Qty: 30 TABLET | Refills: 0 | Status: CANCELLED | OUTPATIENT
Start: 2020-02-21

## 2020-02-21 RX ORDER — METHYLPHENIDATE HYDROCHLORIDE 36 MG/1
36 TABLET ORAL EVERY MORNING
Qty: 30 TABLET | Refills: 0 | Status: SHIPPED | OUTPATIENT
Start: 2020-02-21 | End: 2020-11-17

## 2020-02-21 NOTE — TELEPHONE ENCOUNTER
[place] reviewed:  reviewed- no concerns    [place]  [email or web address]    Controlled Substance Refill Request for Concerta  Problem List Complete:  No     PROVIDER TO CONSIDER COMPLETION OF PROBLEM LIST AND OVERVIEW/CONTROLLED SUBSTANCE AGREEMENT    Last Written Prescription Date:  1/10/20  Last Fill Quantity: 30,   # refills: 0      Last Office Visit with [facility] primary care provider: 1/10/20    Future Office visit:     Controlled substance agreement:   Encounter-Level CSA:    There are no encounter-level csa.     Patient-Level CSA:    There are no patient-level csa.         Last Urine Drug Screen: No results found for: CDAUT, No results found for: COMDAT, No results found for: THC13, PCP13, COC13, MAMP13, OPI13, AMP13, BZO13, TCA13, MTD13, BAR13, OXY13, PPX13, BUP13     Processing:  Rx to be electronically transmitted to pharmacy by provider      [email or web address]       checked in past 3 months?  Yes 2/21/2020

## 2020-05-14 ENCOUNTER — MYC REFILL (OUTPATIENT)
Dept: FAMILY MEDICINE | Facility: CLINIC | Age: 30
End: 2020-05-14

## 2020-05-14 DIAGNOSIS — F90.9 ATTENTION DEFICIT HYPERACTIVITY DISORDER (ADHD), UNSPECIFIED ADHD TYPE: ICD-10-CM

## 2020-05-14 RX ORDER — METHYLPHENIDATE HYDROCHLORIDE 36 MG/1
36 TABLET ORAL EVERY MORNING
Qty: 30 TABLET | Refills: 0 | Status: SHIPPED | OUTPATIENT
Start: 2020-05-14 | End: 2020-06-18

## 2020-05-14 NOTE — TELEPHONE ENCOUNTER
[place]) reviewed:  not reviewed/not due - last done on 3/27/2020    [place]  [email or web address]      Controlled Substance Refill Request for Concerta  Problem List Complete:  No     PROVIDER TO CONSIDER COMPLETION OF PROBLEM LIST AND OVERVIEW/CONTROLLED SUBSTANCE AGREEMENT    Last Written Prescription Date:  3/27/2020  Last Fill Quantity: 30,   # refills: 0    Last Office Visit with [facility] primary care provider: 1/10/2020    Future Office visit:     Controlled substance agreement:   Encounter-Level CSA:    There are no encounter-level csa.     Patient-Level CSA:    There are no patient-level csa.         Last Urine Drug Screen: No results found for: CDAUT, No results found for: COMDAT, No results found for: THC13, PCP13, COC13, MAMP13, OPI13, AMP13, BZO13, TCA13, MTD13, BAR13, OXY13, PPX13, BUP13     Processing:  Rx to be electronically transmitted to pharmacy by provider      [email or web address]       checked in past 3 months?  Yes 3/27/2020

## 2020-06-18 ENCOUNTER — MYC REFILL (OUTPATIENT)
Dept: FAMILY MEDICINE | Facility: CLINIC | Age: 30
End: 2020-06-18

## 2020-06-18 DIAGNOSIS — F90.9 ATTENTION DEFICIT HYPERACTIVITY DISORDER (ADHD), UNSPECIFIED ADHD TYPE: ICD-10-CM

## 2020-06-18 RX ORDER — METHYLPHENIDATE HYDROCHLORIDE 36 MG/1
36 TABLET ORAL EVERY MORNING
Qty: 30 TABLET | Refills: 0 | Status: SHIPPED | OUTPATIENT
Start: 2020-06-18 | End: 2020-11-17

## 2020-06-18 NOTE — TELEPHONE ENCOUNTER
RX monitoring program ([place]MP) reviewed:  reviewed- no concerns    [place] profile:  [email or web address]      Controlled Substance Refill Request for Concerta  Problem List Complete:  No     PROVIDER TO CONSIDER COMPLETION OF PROBLEM LIST AND OVERVIEW/CONTROLLED SUBSTANCE AGREEMENT    Last Written Prescription Date:  5/14/2020  Last Fill Quantity: 30,   # refills: 0    THE MOST RECENT OFFICE VISIT MUST BE WITHIN THE PAST 3 MONTHS. AT LEAST ONE FACE TO FACE VISIT MUST OCCUR EVERY 6 MONTHS. ADDITIONAL VISITS CAN BE VIRTUAL.  (THIS STATEMENT SHOULD BE DELETED.)    Last Office Visit with [facility] primary care provider: 1/10/2020    Future Office visit:     Controlled substance agreement:   Encounter-Level CSA:    There are no encounter-level csa.     Patient-Level CSA:    There are no patient-level csa.         Last Urine Drug Screen: No results found for: CDAUT, No results found for: COMDAT, No results found for: THC13, PCP13, COC13, MAMP13, OPI13, AMP13, BZO13, TCA13, MTD13, BAR13, OXY13, PPX13, BUP13     Processing:  Rx to be electronically transmitted to pharmacy by provider      [email or web address]       checked in past 3 months?  Yes 6/18/2020

## 2020-06-23 ENCOUNTER — MYC MEDICAL ADVICE (OUTPATIENT)
Dept: NURSING | Facility: CLINIC | Age: 30
End: 2020-06-23

## 2020-06-23 DIAGNOSIS — F40.10 SOCIAL PHOBIA: ICD-10-CM

## 2020-06-23 DIAGNOSIS — F33.41 RECURRENT MAJOR DEPRESSIVE DISORDER, IN PARTIAL REMISSION (H): ICD-10-CM

## 2020-06-23 DIAGNOSIS — F41.9 ANXIETY: ICD-10-CM

## 2020-06-23 NOTE — LETTER
June 24, 2020      Gayathri Gomez  [address]   [place] [address]        Dear Gayathri,     We have been trying to reach out to you via your My Chart and phone but have not heard back from you.       We received a refill request for your paroxetine, however we need to update your depression questionnaire as it was elevated when you completed this last in December.   You are also do for a follow up visit in July.     Please call the clinic and we can update the questionnaire and help you schedule your appointment.         Sincerely,        Ramona Ann Aaseby-Aguilera, PA-C/amp

## 2020-06-24 RX ORDER — PAROXETINE 40 MG/1
40 TABLET, FILM COATED ORAL EVERY MORNING
Qty: 30 TABLET | Refills: 0 | Status: SHIPPED | OUTPATIENT
Start: 2020-06-24 | End: 2020-07-15

## 2020-06-24 NOTE — TELEPHONE ENCOUNTER
Routing refill request to provider for review/approval because:  Labs out of range:  PHQ = 9 and due for f/u     Do you want to give 30 day and we can send letter today as she is not responding to calls or my chart requests for f/u     Katy Armando RN

## 2020-06-28 DIAGNOSIS — F40.10 SOCIAL PHOBIA: ICD-10-CM

## 2020-06-29 RX ORDER — PROPRANOLOL HCL 60 MG
CAPSULE, EXTENDED RELEASE 24HR ORAL
Qty: 30 CAPSULE | Refills: 6 | Status: SHIPPED | OUTPATIENT
Start: 2020-06-29 | End: 2021-04-19

## 2020-06-29 NOTE — TELEPHONE ENCOUNTER
Prescription approved per [facility] Refill Protocol.  Vick Patterson RN, BSN

## 2020-07-01 ASSESSMENT — ANXIETY QUESTIONNAIRES
GAD7 TOTAL SCORE: 8
GAD7 TOTAL SCORE: 8
7. FEELING AFRAID AS IF SOMETHING AWFUL MIGHT HAPPEN: MORE THAN HALF THE DAYS
GAD7 TOTAL SCORE: 8
1. FEELING NERVOUS, ANXIOUS, OR ON EDGE: SEVERAL DAYS
3. WORRYING TOO MUCH ABOUT DIFFERENT THINGS: MORE THAN HALF THE DAYS
4. TROUBLE RELAXING: SEVERAL DAYS
5. BEING SO RESTLESS THAT IT IS HARD TO SIT STILL: NOT AT ALL
7. FEELING AFRAID AS IF SOMETHING AWFUL MIGHT HAPPEN: MORE THAN HALF THE DAYS
2. NOT BEING ABLE TO STOP OR CONTROL WORRYING: SEVERAL DAYS
6. BECOMING EASILY ANNOYED OR IRRITABLE: SEVERAL DAYS

## 2020-07-01 ASSESSMENT — PATIENT HEALTH QUESTIONNAIRE - PHQ9
SUM OF ALL RESPONSES TO PHQ QUESTIONS 1-9: 10
SUM OF ALL RESPONSES TO PHQ QUESTIONS 1-9: 10
10. IF YOU CHECKED OFF ANY PROBLEMS, HOW DIFFICULT HAVE THESE PROBLEMS MADE IT FOR YOU TO DO YOUR WORK, TAKE CARE OF THINGS AT HOME, OR GET ALONG WITH OTHER PEOPLE: SOMEWHAT DIFFICULT

## 2020-07-02 ASSESSMENT — ANXIETY QUESTIONNAIRES: GAD7 TOTAL SCORE: 8

## 2020-07-02 ASSESSMENT — PATIENT HEALTH QUESTIONNAIRE - PHQ9: SUM OF ALL RESPONSES TO PHQ QUESTIONS 1-9: 10

## 2020-07-02 NOTE — TELEPHONE ENCOUNTER
Message handled by Nurse Triage with [employer] - provider name: Becki.    Becki reviewed the message and ok to wait for appt as scheduled    Vick Patterson RN, BSN

## 2020-07-02 NOTE — TELEPHONE ENCOUNTER
Routing [employer] messages to provider and clinic administrator.      Paxil was filled on 6/24/20

## 2020-07-15 ENCOUNTER — VIRTUAL VISIT (OUTPATIENT)
Dept: FAMILY MEDICINE | Facility: CLINIC | Age: 30
End: 2020-07-15
Payer: COMMERCIAL

## 2020-07-15 DIAGNOSIS — F41.9 ANXIETY: ICD-10-CM

## 2020-07-15 DIAGNOSIS — F40.10 SOCIAL PHOBIA: ICD-10-CM

## 2020-07-15 DIAGNOSIS — F90.9 ATTENTION DEFICIT HYPERACTIVITY DISORDER (ADHD), UNSPECIFIED ADHD TYPE: Primary | ICD-10-CM

## 2020-07-15 DIAGNOSIS — F33.41 RECURRENT MAJOR DEPRESSIVE DISORDER, IN PARTIAL REMISSION (H): ICD-10-CM

## 2020-07-15 PROCEDURE — 99214 OFFICE O/P EST MOD 30 MIN: CPT | Mod: TEL | Performed by: PHYSICIAN ASSISTANT

## 2020-07-15 PROCEDURE — 96127 BRIEF EMOTIONAL/BEHAV ASSMT: CPT | Mod: TEL | Performed by: PHYSICIAN ASSISTANT

## 2020-07-15 RX ORDER — METHYLPHENIDATE HYDROCHLORIDE 36 MG/1
36 TABLET ORAL DAILY
Qty: 30 TABLET | Refills: 0 | Status: SHIPPED | OUTPATIENT
Start: 2020-07-15 | End: 2020-08-14

## 2020-07-15 RX ORDER — METHYLPHENIDATE HYDROCHLORIDE 36 MG/1
36 TABLET ORAL DAILY
Qty: 30 TABLET | Refills: 0 | Status: SHIPPED | OUTPATIENT
Start: 2020-08-15 | End: 2020-09-14

## 2020-07-15 RX ORDER — PAROXETINE 40 MG/1
40 TABLET, FILM COATED ORAL EVERY MORNING
Qty: 90 TABLET | Refills: 3 | Status: SHIPPED | OUTPATIENT
Start: 2020-07-15 | End: 2021-07-26

## 2020-07-15 RX ORDER — METHYLPHENIDATE HYDROCHLORIDE 36 MG/1
36 TABLET ORAL DAILY
Qty: 30 TABLET | Refills: 0 | Status: SHIPPED | OUTPATIENT
Start: 2020-09-15 | End: 2020-10-15

## 2020-07-15 ASSESSMENT — ANXIETY QUESTIONNAIRES
2. NOT BEING ABLE TO STOP OR CONTROL WORRYING: NOT AT ALL
6. BECOMING EASILY ANNOYED OR IRRITABLE: SEVERAL DAYS
5. BEING SO RESTLESS THAT IT IS HARD TO SIT STILL: NOT AT ALL
7. FEELING AFRAID AS IF SOMETHING AWFUL MIGHT HAPPEN: SEVERAL DAYS
3. WORRYING TOO MUCH ABOUT DIFFERENT THINGS: SEVERAL DAYS
1. FEELING NERVOUS, ANXIOUS, OR ON EDGE: SEVERAL DAYS
GAD7 TOTAL SCORE: 5

## 2020-07-15 ASSESSMENT — PATIENT HEALTH QUESTIONNAIRE - PHQ9
SUM OF ALL RESPONSES TO PHQ QUESTIONS 1-9: 5
5. POOR APPETITE OR OVEREATING: SEVERAL DAYS
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
## 2020-07-16 ASSESSMENT — ANXIETY QUESTIONNAIRES: GAD7 TOTAL SCORE: 5

## 2020-07-29 ENCOUNTER — OFFICE VISIT (OUTPATIENT)
Dept: FAMILY MEDICINE | Facility: CLINIC | Age: 30
End: 2020-07-29
Payer: COMMERCIAL

## 2020-07-29 VITALS
HEIGHT: 65 IN | SYSTOLIC BLOOD PRESSURE: 120 MMHG | TEMPERATURE: 98.5 F | BODY MASS INDEX: 29.95 KG/M2 | OXYGEN SATURATION: 98 % | DIASTOLIC BLOOD PRESSURE: 82 MMHG | HEART RATE: 73 BPM

## 2020-07-29 DIAGNOSIS — Z13.0 SCREENING FOR BLOOD DISEASE: ICD-10-CM

## 2020-07-29 DIAGNOSIS — Z13.220 SCREENING, LIPID: ICD-10-CM

## 2020-07-29 DIAGNOSIS — S23.9XXA THORACIC BACK SPRAIN, INITIAL ENCOUNTER: ICD-10-CM

## 2020-07-29 DIAGNOSIS — Z13.1 SCREENING FOR DIABETES MELLITUS: ICD-10-CM

## 2020-07-29 DIAGNOSIS — A64 STD (SEXUALLY TRANSMITTED DISEASE): ICD-10-CM

## 2020-07-29 DIAGNOSIS — M54.2 NECK PAIN: ICD-10-CM

## 2020-07-29 DIAGNOSIS — L98.9 SKIN LESION: ICD-10-CM

## 2020-07-29 DIAGNOSIS — Z30.011 OCP (ORAL CONTRACEPTIVE PILLS) INITIATION: ICD-10-CM

## 2020-07-29 DIAGNOSIS — Z00.00 ROUTINE GENERAL MEDICAL EXAMINATION AT A HEALTH CARE FACILITY: Primary | ICD-10-CM

## 2020-07-29 PROCEDURE — 87491 CHLMYD TRACH DNA AMP PROBE: CPT | Performed by: PHYSICIAN ASSISTANT

## 2020-07-29 PROCEDURE — 99395 PREV VISIT EST AGE 18-39: CPT | Performed by: PHYSICIAN ASSISTANT

## 2020-07-29 PROCEDURE — 87591 N.GONORRHOEAE DNA AMP PROB: CPT | Performed by: PHYSICIAN ASSISTANT

## 2020-07-29 RX ORDER — NORETHINDRONE ACETATE AND ETHINYL ESTRADIOL .02; 1 MG/1; MG/1
1 TABLET ORAL DAILY
Qty: 84 TABLET | Refills: 3 | Status: SHIPPED | OUTPATIENT
Start: 2020-07-29 | End: 2021-02-17

## 2020-07-29 ASSESSMENT — ENCOUNTER SYMPTOMS
DIZZINESS: 0
ABDOMINAL PAIN: 0
PARESTHESIAS: 0
CONSTIPATION: 0
NERVOUS/ANXIOUS: 0
JOINT SWELLING: 0
MYALGIAS: 1
HEMATURIA: 0
SORE THROAT: 0
FREQUENCY: 0
WEAKNESS: 0
CHILLS: 0
NAUSEA: 0
ARTHRALGIAS: 0
DYSURIA: 0
FEVER: 0
EYE PAIN: 0
HEMATOCHEZIA: 0
PALPITATIONS: 0
COUGH: 0
HEADACHES: 1
BREAST MASS: 0
SHORTNESS OF BREATH: 0
DIARRHEA: 0
HEARTBURN: 1

## 2020-07-29 NOTE — PROGRESS NOTES
SUBJECTIVE:   CC: Gayathri Gomez is an 29 year old woman who presents for preventive health visit.     Healthy Habits:     Getting at least 3 servings of Calcium per day:  NO    Bi-annual eye exam:  NO    Dental care twice a year:  Yes    Sleep apnea or symptoms of sleep apnea:  Daytime drowsiness    Diet:  Regular (no restrictions)    Frequency of exercise:  2-3 days/week    Duration of exercise:  30-45 minutes    Taking medications regularly:  Yes    Medication side effects:  None    PHQ-2 Total Score: 1    Additional concerns today:  Yes              Today's PHQ-2 Score:   PHQ-2 ( 1999 [employer]) 7/29/2020   Q1: Little interest or pleasure in doing things 0   Q2: Feeling down, depressed or hopeless 1   PHQ-2 Score 1   Q1: Little interest or pleasure in doing things Not at all   Q2: Feeling down, depressed or hopeless Several days   PHQ-2 Score 1       Abuse: Current or Past(Physical, Sexual or Emotional)- No  Do you feel safe in your environment? Yes        Social History     Tobacco Use     Smoking status: Current Some Day Smoker     Years: 5.00     Types: Cigarettes     Smokeless tobacco: Never Used   Substance Use Topics     Alcohol use: Yes     Alcohol/week: 0.0 standard drinks     Comment: occ     If you drink alcohol do you typically have >3 drinks per day or >7 drinks per week? No    Alcohol Use 7/29/2020   Prescreen: >3 drinks/day or >7 drinks/week? No   Prescreen: >3 drinks/day or >7 drinks/week? -   No flowsheet data found.    Reviewed orders with patient.  Reviewed health maintenance and updated orders accordingly - Yes  BP Readings from Last 3 Encounters:   07/29/20 120/82   01/10/20 103/60   01/06/20 106/74    Wt Readings from Last 3 Encounters:   12/23/19 81.6 kg (180 lb)   12/03/19 81.6 kg (180 lb)   11/17/19 81.6 kg (180 lb)                  Current Outpatient Medications   Medication Sig Dispense Refill     norethindrone-ethinyl estradiol (MICROGESTIN 1/20) 1-20 MG-MCG tablet Take 1 tablet by mouth  daily 84 tablet 3     famotidine (PEPCID) 10 MG tablet Take 10 mg by mouth 2 times daily as needed        levothyroxine (SYNTHROID/LEVOTHROID) 50 MCG tablet Take 1 tablet (50 mcg) by mouth daily 90 tablet 3     methylphenidate (CONCERTA) 36 MG CR tablet Take 1 tablet (36 mg) by mouth daily 30 tablet 0     [START ON 8/15/2020] methylphenidate (CONCERTA) 36 MG CR tablet Take 1 tablet (36 mg) by mouth daily 30 tablet 0     [START ON 9/15/2020] methylphenidate (CONCERTA) 36 MG CR tablet Take 1 tablet (36 mg) by mouth daily 30 tablet 0     methylphenidate (CONCERTA) 36 MG CR tablet Take 1 tablet (36 mg) by mouth every morning 30 tablet 0     methylphenidate 36 MG PO CR tablet Take 1 tablet (36 mg) by mouth every morning 30 tablet 0     naproxen (NAPROSYN) 500 MG tablet Take 1 tablet (500 mg) by mouth 2 times daily (with meals) 30 tablet 0     ondansetron (ZOFRAN) 8 MG tablet Take 1 tablet (8 mg) by mouth every 8 hours as needed for nausea 20 tablet 0     order for DME SAD light 1 each 0     oxyCODONE-acetaminophen (PERCOCET) 5-325 MG tablet Take 1-2 tablets by mouth every 4 hours as needed for moderate to severe pain (Patient not taking: Reported on 7/15/2020) 20 tablet 0     PARoxetine (PAXIL) 40 MG tablet Take 1 tablet (40 mg) by mouth every morning 90 tablet 3     propranolol ER (INDERAL LA) 60 MG 24 hr capsule TAKE 1 CAPSULE BY MOUTH EVERY DAY 30 capsule 6     scopolamine (TRANSDERM) 1 MG/3DAYS 72 hr patch Place 1 patch onto the skin every 72 hours 3 patch 1     tiZANidine (ZANAFLEX) 2 MG tablet Take 1-2 tablets (2-4 mg) by mouth 3 times daily as needed for muscle spasms 60 tablet 1     Allergies   Allergen Reactions     Sulfa Drugs Hives     Recent Labs   Lab Test 11/18/19  0824 11/17/19  1741 08/05/19  0856 04/29/19  0846 04/08/19  0905  06/26/17  0916   LDL  --   --   --   --  149*  --  152*   HDL  --   --   --   --  68  --  68   TRIG  --   --   --   --  55  --  131   ALT  --   --   --   --  22  --   --    CR 0.69  0.87  --   --  0.72   < >  --    GFRESTIMATED >90 89  --   --  >90   < >  --    GFRESTBLACK >90 >90  --   --  >90   < >  --    POTASSIUM 3.6 3.5  --   --  3.1*   < >  --    TSH  --   --  2.36 3.90  --   --   --     < > = values in this interval not displayed.        Mammogram not appropriate for this patient based on age.    Pertinent mammograms are reviewed under the imaging tab.  History of abnormal Pap smear: NO - age 30- 65 PAP every 3 years recommended  PAP / HPV 4/8/2019 5/19/2016 2/5/2013   PAP NIL NIL NIL     Reviewed and updated as needed this visit by clinical staff  Tobacco  Allergies  Med Hx  Surg Hx  Fam Hx  Soc Hx        Reviewed and updated as needed this visit by Provider            Review of Systems   Constitutional: Negative for chills and fever.   HENT: Negative for congestion, ear pain, hearing loss and sore throat.    Eyes: Negative for pain and visual disturbance.   Respiratory: Negative for cough and shortness of breath.    Cardiovascular: Negative for chest pain, palpitations and peripheral edema.   Gastrointestinal: Positive for heartburn. Negative for abdominal pain, constipation, diarrhea, hematochezia and nausea.   Breasts:  Negative for tenderness, breast mass and discharge.   Genitourinary: Negative for dysuria, frequency, genital sores, hematuria, pelvic pain, urgency, vaginal bleeding and vaginal discharge.   Musculoskeletal: Positive for myalgias. Negative for arthralgias and joint swelling.   Skin: Negative for rash.   Neurological: Positive for headaches. Negative for dizziness, weakness and paresthesias.   Psychiatric/Behavioral: Negative for mood changes. The patient is not nervous/anxious.      CONSTITUTIONAL: NEGATIVE for fever, chills, change in weight  INTEGUMENTARU/SKIN: NEGATIVE for worrisome rashes, moles or lesions  EYES: NEGATIVE for vision changes or irritation  ENT: NEGATIVE for ear, mouth and throat problems  RESP: NEGATIVE for significant cough or SOB  BREAST:  "NEGATIVE for masses, tenderness or discharge  CV: NEGATIVE for chest pain, palpitations or peripheral edema  GI: NEGATIVE for nausea, abdominal pain, heartburn, or change in bowel habits  : NEGATIVE for unusual urinary or vaginal symptoms. Periods are regular.  MUSCULOSKELETAL: NEGATIVE for significant arthralgias or myalgia  NEURO: NEGATIVE for weakness, dizziness or paresthesias  PSYCHIATRIC: NEGATIVE for changes in mood or affect     OBJECTIVE:   Pulse 73   Temp 98.5  F (36.9  C) (Oral)   Ht 1.651 m (5' 5\")   LMP 07/09/2020 (Exact Date)   SpO2 98%   BMI 29.95 kg/m    Physical Exam  GENERAL: healthy, alert and no distress  EYES: Eyes grossly normal to inspection, PERRL and conjunctivae and sclerae normal  HENT: ear canals and TM's normal, nose and mouth without ulcers or lesions  NECK: no adenopathy, no asymmetry, masses, or scars and thyroid normal to palpation  RESP: lungs clear to auscultation - no rales, rhonchi or wheezes  BREAST: normal without masses, tenderness or nipple discharge and no palpable axillary masses or adenopathy  CV: regular rate and rhythm, normal S1 S2, no S3 or S4, no murmur, click or rub, no peripheral edema and peripheral pulses strong  ABDOMEN: soft, nontender, no hepatosplenomegaly, no masses and bowel sounds normal  MS: no gross musculoskeletal defects noted, no edema  SKIN: 1 cm papule on right lateral distal thigh  NEURO: Normal strength and tone, mentation intact and speech normal  PSYCH: mentation appears normal, affect normal/bright    Diagnostic Test Results:  Labs reviewed in Epic    ASSESSMENT/PLAN:   1. Routine general medical examination at a health care facility      2. OCP (oral contraceptive pills) initiation    - norethindrone-ethinyl estradiol (MICROGESTIN 1/20) 1-20 MG-MCG tablet; Take 1 tablet by mouth daily  Dispense: 84 tablet; Refill: 3    3. STD (sexually transmitted disease)    - Chlamydia trachomatis PCR  - Neisseria gonorrhoeae PCR    4. Skin lesion    - " "DERMATOLOGY REFERRAL    COUNSELING:  Reviewed preventive health counseling, as reflected in patient instructions       Regular exercise       Healthy diet/nutrition       Vision screening       Hearing screening       Contraception    Estimated body mass index is 29.95 kg/m  as calculated from the following:    Height as of this encounter: 1.651 m (5' 5\").    Weight as of 12/23/19: 81.6 kg (180 lb).         reports that she has been smoking cigarettes. She has smoked for the past 5.00 years. She has never used smokeless tobacco.  Tobacco Cessation Action Plan: Information offered: Patient not interested at this time    Counseling Resources:  ATP IV Guidelines  Pooled Cohorts Equation Calculator  Breast Cancer Risk Calculator  FRAX Risk Assessment  ICSI Preventive Guidelines  Dietary Guidelines for Americans, 2010  USDA's MyPlate  ASA Prophylaxis  Lung CA Screening    Ramona Ann Aaseby-Aguilera, PA-C  [facility]  "

## 2020-07-30 LAB
C TRACH DNA SPEC QL NAA+PROBE: NEGATIVE
N GONORRHOEA DNA SPEC QL NAA+PROBE: NEGATIVE
SPECIMEN SOURCE: NORMAL
SPECIMEN SOURCE: NORMAL

## 2020-08-31 ENCOUNTER — MYC REFILL (OUTPATIENT)
Dept: FAMILY MEDICINE | Facility: CLINIC | Age: 30
End: 2020-08-31

## 2020-08-31 DIAGNOSIS — F90.9 ATTENTION DEFICIT HYPERACTIVITY DISORDER (ADHD), UNSPECIFIED ADHD TYPE: ICD-10-CM

## 2020-08-31 DIAGNOSIS — E03.9 HYPOTHYROIDISM: Primary | ICD-10-CM

## 2020-08-31 RX ORDER — METHYLPHENIDATE HYDROCHLORIDE 36 MG/1
36 TABLET ORAL EVERY MORNING
Qty: 30 TABLET | Refills: 0 | Status: CANCELLED | OUTPATIENT
Start: 2020-08-31

## 2020-09-25 DIAGNOSIS — E03.9 HYPOTHYROIDISM, UNSPECIFIED TYPE: ICD-10-CM

## 2020-09-25 RX ORDER — LEVOTHYROXINE SODIUM 50 UG/1
TABLET ORAL
Qty: 30 TABLET | Refills: 0 | Status: SHIPPED | OUTPATIENT
Start: 2020-09-25 | End: 2020-12-07

## 2020-09-25 NOTE — LETTER
October 9, 2020      Gayathri Gomez  [address]   [place] [address]        Dear Gayathri,     Hope you are well, we are reaching out to you because according to our records you are due for a lab appointment to recheck thyroid level. Please call the clinic at [phone] to scheduled this visit.     Thank you for choosing [facility]. We appreciate the opportunity to serve you and look forward to supporting your healthcare needs in the future.    If you have any questions or concerns, please contact us at [phone].          Sincerely,          Ramona Ann Aaseby-Aguilera, PA-C

## 2020-09-25 NOTE — TELEPHONE ENCOUNTER
Medication is being filled for 1 time refill only due to:  Patient needs labs TSH. Future labs ordered TSH.     Please call patient and assist in scheduling lab only appointment, future labs already ordered in chart.    Kya OLIVARES RN, BSN

## 2020-10-02 NOTE — TELEPHONE ENCOUNTER
2nd attempt-LVM[facility]-please inform patient she needs a lab appointment recheck Thyroid medication, 1 months was sent into her pharmacy. Corazon Patel

## 2020-11-17 ENCOUNTER — MYC REFILL (OUTPATIENT)
Dept: FAMILY MEDICINE | Facility: CLINIC | Age: 30
End: 2020-11-17

## 2020-11-17 DIAGNOSIS — F90.9 ATTENTION DEFICIT HYPERACTIVITY DISORDER (ADHD), UNSPECIFIED ADHD TYPE: ICD-10-CM

## 2020-11-17 RX ORDER — METHYLPHENIDATE HYDROCHLORIDE 36 MG/1
36 TABLET ORAL DAILY
Qty: 30 TABLET | Refills: 0 | Status: SHIPPED | OUTPATIENT
Start: 2021-01-18 | End: 2021-02-17

## 2020-11-17 RX ORDER — METHYLPHENIDATE HYDROCHLORIDE 36 MG/1
36 TABLET ORAL EVERY MORNING
Qty: 30 TABLET | Refills: 0 | Status: CANCELLED | OUTPATIENT
Start: 2020-11-17

## 2020-11-17 RX ORDER — METHYLPHENIDATE HYDROCHLORIDE 36 MG/1
36 TABLET ORAL DAILY
Qty: 30 TABLET | Refills: 0 | Status: SHIPPED | OUTPATIENT
Start: 2020-12-18 | End: 2021-01-15

## 2020-11-17 RX ORDER — METHYLPHENIDATE HYDROCHLORIDE 36 MG/1
36 TABLET ORAL DAILY
Qty: 30 TABLET | Refills: 0 | Status: SHIPPED | OUTPATIENT
Start: 2020-11-17 | End: 2020-12-17

## 2020-11-17 NOTE — TELEPHONE ENCOUNTER
[place] reviewed:  reviewed- no concerns    [place]  [email or web address]      Controlled Substance Refill Request for Concerta  Problem List Complete:  No     PROVIDER TO CONSIDER COMPLETION OF PROBLEM LIST AND OVERVIEW/CONTROLLED SUBSTANCE AGREEMENT    Last Written Prescription Date:  7/15/20  Last Fill Quantity: 30,   # refills: 2    THE MOST RECENT OFFICE VISIT MUST BE WITHIN THE PAST 3 MONTHS. AT LEAST ONE FACE TO FACE VISIT MUST OCCUR EVERY 6 MONTHS. ADDITIONAL VISITS CAN BE VIRTUAL.  (THIS STATEMENT SHOULD BE DELETED.)    Last Office Visit with [facility] primary care provider: 7/15/20    Future Office visit:     Controlled substance agreement:   Encounter-Level CSA:    There are no encounter-level csa.     Patient-Level CSA:    There are no patient-level csa.         Last Urine Drug Screen: No results found for: CDAUT, No results found for: COMDAT, No results found for: THC13, PCP13, COC13, MAMP13, OPI13, AMP13, BZO13, TCA13, MTD13, BAR13, OXY13, PPX13, BUP13     Processing:  Rx to be electronically transmitted to pharmacy by provider      [email or web address]       checked in past 3 months?  Yes 11/17/20

## 2020-12-14 ENCOUNTER — HEALTH MAINTENANCE LETTER (OUTPATIENT)
Age: 30
End: 2020-12-14

## 2021-01-06 DIAGNOSIS — E03.9 HYPOTHYROIDISM, UNSPECIFIED TYPE: ICD-10-CM

## 2021-01-06 RX ORDER — LEVOTHYROXINE SODIUM 50 UG/1
50 TABLET ORAL DAILY
Qty: 30 TABLET | Refills: 0 | Status: SHIPPED | OUTPATIENT
Start: 2021-01-06 | End: 2021-01-15

## 2021-01-06 NOTE — TELEPHONE ENCOUNTER
Routing refill request to provider for review/approval because:  Labs not current:  TSH   Patient needs to be seen because it has been more than 1 year since last office visit.  Letters have been sent x 3, Renuka refills have been given x 2.     Quita Crockett R.N.

## 2021-01-15 ENCOUNTER — MYC REFILL (OUTPATIENT)
Dept: FAMILY MEDICINE | Facility: CLINIC | Age: 31
End: 2021-01-15

## 2021-01-15 DIAGNOSIS — F90.9 ATTENTION DEFICIT HYPERACTIVITY DISORDER (ADHD), UNSPECIFIED ADHD TYPE: ICD-10-CM

## 2021-01-15 DIAGNOSIS — E03.9 HYPOTHYROIDISM, UNSPECIFIED TYPE: ICD-10-CM

## 2021-01-18 NOTE — TELEPHONE ENCOUNTER
Routing refill request to provider for review/approval because:  Drug not on the [facility] refill protocol   Labs not current:  TSH    Kya OLIVARES RN, BSN

## 2021-01-19 RX ORDER — LEVOTHYROXINE SODIUM 50 UG/1
50 TABLET ORAL DAILY
Qty: 30 TABLET | Refills: 0 | Status: SHIPPED | OUTPATIENT
Start: 2021-01-19 | End: 2021-02-17

## 2021-01-19 RX ORDER — METHYLPHENIDATE HYDROCHLORIDE 36 MG/1
36 TABLET ORAL DAILY
Qty: 30 TABLET | Refills: 0 | Status: SHIPPED | OUTPATIENT
Start: 2021-01-19 | End: 2021-07-14

## 2021-01-27 DIAGNOSIS — Z13.0 SCREENING FOR BLOOD DISEASE: ICD-10-CM

## 2021-01-27 DIAGNOSIS — E03.9 HYPOTHYROIDISM, UNSPECIFIED TYPE: ICD-10-CM

## 2021-01-27 DIAGNOSIS — Z13.1 SCREENING FOR DIABETES MELLITUS: ICD-10-CM

## 2021-01-27 DIAGNOSIS — Z13.220 SCREENING, LIPID: ICD-10-CM

## 2021-01-27 LAB
CHOLEST SERPL-MCNC: 214 MG/DL
ERYTHROCYTE [DISTWIDTH] IN BLOOD BY AUTOMATED COUNT: 12.6 % (ref 10–15)
GLUCOSE SERPL-MCNC: 100 MG/DL (ref 70–99)
HCT VFR BLD AUTO: 38.8 % (ref 35–47)
HDLC SERPL-MCNC: 61 MG/DL
HGB BLD-MCNC: 12.9 G/DL (ref 11.7–15.7)
LDLC SERPL CALC-MCNC: 142 MG/DL
MCH RBC QN AUTO: 30.5 PG (ref 26.5–33)
MCHC RBC AUTO-ENTMCNC: 33.2 G/DL (ref 31.5–36.5)
MCV RBC AUTO: 92 FL (ref 78–100)
NONHDLC SERPL-MCNC: 153 MG/DL
PLATELET # BLD AUTO: 403 10E9/L (ref 150–450)
RBC # BLD AUTO: 4.23 10E12/L (ref 3.8–5.2)
TRIGL SERPL-MCNC: 55 MG/DL
TSH SERPL DL<=0.005 MIU/L-ACNC: 2.97 MU/L (ref 0.4–4)
WBC # BLD AUTO: 8.5 10E9/L (ref 4–11)

## 2021-01-27 PROCEDURE — 84443 ASSAY THYROID STIM HORMONE: CPT | Performed by: PHYSICIAN ASSISTANT

## 2021-01-27 PROCEDURE — 36415 COLL VENOUS BLD VENIPUNCTURE: CPT | Performed by: PHYSICIAN ASSISTANT

## 2021-01-27 PROCEDURE — 82947 ASSAY GLUCOSE BLOOD QUANT: CPT | Performed by: PHYSICIAN ASSISTANT

## 2021-01-27 PROCEDURE — 85027 COMPLETE CBC AUTOMATED: CPT | Performed by: PHYSICIAN ASSISTANT

## 2021-01-27 PROCEDURE — 80061 LIPID PANEL: CPT | Performed by: PHYSICIAN ASSISTANT

## 2021-02-17 ENCOUNTER — OFFICE VISIT (OUTPATIENT)
Dept: FAMILY MEDICINE | Facility: CLINIC | Age: 31
End: 2021-02-17
Payer: COMMERCIAL

## 2021-02-17 VITALS
OXYGEN SATURATION: 98 % | HEART RATE: 66 BPM | SYSTOLIC BLOOD PRESSURE: 126 MMHG | TEMPERATURE: 99.5 F | WEIGHT: 180 LBS | BODY MASS INDEX: 29.99 KG/M2 | DIASTOLIC BLOOD PRESSURE: 76 MMHG | HEIGHT: 65 IN

## 2021-02-17 DIAGNOSIS — F33.41 RECURRENT MAJOR DEPRESSIVE DISORDER, IN PARTIAL REMISSION (H): ICD-10-CM

## 2021-02-17 DIAGNOSIS — S06.9X9A UNSPECIFIED INTRACRANIAL INJURY WITH LOSS OF CONSCIOUSNESS OF UNSPECIFIED DURATION, INITIAL ENCOUNTER (H): ICD-10-CM

## 2021-02-17 DIAGNOSIS — F90.9 ATTENTION DEFICIT HYPERACTIVITY DISORDER (ADHD), UNSPECIFIED ADHD TYPE: ICD-10-CM

## 2021-02-17 DIAGNOSIS — E03.9 HYPOTHYROIDISM, UNSPECIFIED TYPE: ICD-10-CM

## 2021-02-17 DIAGNOSIS — Z11.59 NEED FOR HEPATITIS C SCREENING TEST: Primary | ICD-10-CM

## 2021-02-17 DIAGNOSIS — G95.20 UNSPECIFIED CORD COMPRESSION (H): ICD-10-CM

## 2021-02-17 PROCEDURE — 99214 OFFICE O/P EST MOD 30 MIN: CPT | Performed by: PHYSICIAN ASSISTANT

## 2021-02-17 RX ORDER — METHYLPHENIDATE HYDROCHLORIDE 36 MG/1
36 TABLET ORAL DAILY
Qty: 30 TABLET | Refills: 0 | Status: SHIPPED | OUTPATIENT
Start: 2021-04-20 | End: 2021-05-20

## 2021-02-17 RX ORDER — METHYLPHENIDATE HYDROCHLORIDE 36 MG/1
36 TABLET ORAL DAILY
Qty: 30 TABLET | Refills: 0 | Status: SHIPPED | OUTPATIENT
Start: 2021-03-20 | End: 2021-04-19

## 2021-02-17 RX ORDER — LEVOTHYROXINE SODIUM 50 UG/1
50 TABLET ORAL DAILY
Qty: 90 TABLET | Refills: 3 | Status: SHIPPED | OUTPATIENT
Start: 2021-02-17 | End: 2022-03-17

## 2021-02-17 RX ORDER — METHYLPHENIDATE HYDROCHLORIDE 36 MG/1
36 TABLET ORAL DAILY
Qty: 30 TABLET | Refills: 0 | Status: SHIPPED | OUTPATIENT
Start: 2021-02-17 | End: 2021-03-19

## 2021-02-17 ASSESSMENT — MIFFLIN-ST. JEOR: SCORE: 1532.35

## 2021-02-17 ASSESSMENT — PATIENT HEALTH QUESTIONNAIRE - PHQ9
10. IF YOU CHECKED OFF ANY PROBLEMS, HOW DIFFICULT HAVE THESE PROBLEMS MADE IT FOR YOU TO DO YOUR WORK, TAKE CARE OF THINGS AT HOME, OR GET ALONG WITH OTHER PEOPLE: SOMEWHAT DIFFICULT
SUM OF ALL RESPONSES TO PHQ QUESTIONS 1-9: 5
SUM OF ALL RESPONSES TO PHQ QUESTIONS 1-9: 5

## 2021-02-17 NOTE — PROGRESS NOTES
"    Assessment & Plan         Hypothyroidism, unspecified type    - levothyroxine (SYNTHROID/LEVOTHROID) 50 MCG tablet; Take 1 tablet (50 mcg) by mouth daily , please schedule lab appointment.    Attention deficit hyperactivity disorder (ADHD), unspecified ADHD type    - methylphenidate (CONCERTA) 36 MG CR tablet; Take 1 tablet (36 mg) by mouth daily  - methylphenidate (CONCERTA) 36 MG CR tablet; Take 1 tablet (36 mg) by mouth daily  - methylphenidate (CONCERTA) 36 MG CR tablet; Take 1 tablet (36 mg) by mouth daily             BMI:   Estimated body mass index is 29.95 kg/m  as calculated from the following:    Height as of this encounter: 1.651 m (5' 5\").    Weight as of this encounter: 81.6 kg (180 lb).   Weight management plan: Discussed healthy diet and exercise guidelines    Depression Screening Follow Up    PHQ 2/17/2021   PHQ-9 Total Score 5   Q9: Thoughts of better off dead/self-harm past 2 weeks Several days   F/U: Thoughts of suicide or self-harm No   F/U: Self harm-plan -   F/U: Self-harm action -   F/U: Safety concerns No     Last PHQ-9 2/17/2021   1.  Little interest or pleasure in doing things 0   2.  Feeling down, depressed, or hopeless 0   3.  Trouble falling or staying asleep, or sleeping too much 1   4.  Feeling tired or having little energy 1   5.  Poor appetite or overeating 1   6.  Feeling bad about yourself 1   7.  Trouble concentrating 0   8.  Moving slowly or restless 0   Q9: Thoughts of better off dead/self-harm past 2 weeks 1   PHQ-9 Total Score 5   Difficulty at work, home, or with people -   In the past two weeks have you had thoughts of suicide or self harm? No   Do you have concerns about your personal safety or the safety of others? No   In the past 2 weeks have you thought about a plan or had intention to harm yourself? -   In the past 2 weeks have you acted on these thoughts in any way? -                     No follow-ups on file.    Ramona Ann Aaseby-Aguilera, PA-C  [facility]" [facility]    Nilay Trinh is a 30 year old who presents for the following health issues     HPI     ADHD : doing well on current dosage.     Medication Followup     Taking Medication as prescribed: yes    Side Effects:  BC- weight gain    Medication Helping Symptoms:  yes         Review of Systems   Constitutional, HEENT, cardiovascular, pulmonary, gi and gu systems are negative, except as otherwise noted.      Objective    There were no vitals taken for this visit.  There is no height or weight on file to calculate BMI.  Physical Exam   GENERAL: healthy, alert and no distress  HENT: ear canals and TM's normal, nose and mouth without ulcers or lesions  RESP: lungs clear to auscultation - no rales, rhonchi or wheezes  CV: regular rate and rhythm, normal S1 S2, no S3 or S4, no murmur, click or rub, no peripheral edema and peripheral pulses strong                Answers for HPI/ROS submitted by the patient on 2/17/2021   If you checked off any problems, how difficult have these problems made it for you to do your work, take care of things at home, or get along with other people?: Somewhat difficult  PHQ9 TOTAL SCORE: 5

## 2021-02-18 ASSESSMENT — PATIENT HEALTH QUESTIONNAIRE - PHQ9: SUM OF ALL RESPONSES TO PHQ QUESTIONS 1-9: 5

## 2021-04-17 DIAGNOSIS — F40.10 SOCIAL PHOBIA: ICD-10-CM

## 2021-04-19 RX ORDER — PROPRANOLOL HCL 60 MG
CAPSULE, EXTENDED RELEASE 24HR ORAL
Qty: 30 CAPSULE | Refills: 6 | Status: SHIPPED | OUTPATIENT
Start: 2021-04-19 | End: 2021-12-13

## 2021-04-19 NOTE — TELEPHONE ENCOUNTER
Prescription approved per [facility] Refill Protocol.  Aaron PEDERSEN RN

## 2021-06-06 NOTE — PATIENT INSTRUCTIONS

## 2021-07-14 ENCOUNTER — MYC MEDICAL ADVICE (OUTPATIENT)
Dept: FAMILY MEDICINE | Facility: CLINIC | Age: 31
End: 2021-07-14

## 2021-07-14 ENCOUNTER — MYC REFILL (OUTPATIENT)
Dept: FAMILY MEDICINE | Facility: CLINIC | Age: 31
End: 2021-07-14

## 2021-07-14 DIAGNOSIS — F90.9 ATTENTION DEFICIT HYPERACTIVITY DISORDER (ADHD), UNSPECIFIED ADHD TYPE: ICD-10-CM

## 2021-07-14 RX ORDER — METHYLPHENIDATE HYDROCHLORIDE 36 MG/1
36 TABLET ORAL DAILY
Qty: 30 TABLET | Refills: 0 | Status: SHIPPED | OUTPATIENT
Start: 2021-07-14 | End: 2021-12-01

## 2021-07-14 RX ORDER — METHYLPHENIDATE HYDROCHLORIDE 36 MG/1
36 TABLET ORAL DAILY
Qty: 30 TABLET | Refills: 0 | Status: CANCELLED | OUTPATIENT
Start: 2021-07-14

## 2021-07-14 NOTE — TELEPHONE ENCOUNTER
Routing to Ramona Ann Aaseby-Aguilera, PA-C,    Patient due for med re-check in August and is requesting Concerta refill, please advise if patient to schedule physical in August or just a med check, re-route to  for scheduling    Ned Sethi RN

## 2021-07-14 NOTE — TELEPHONE ENCOUNTER
Routing refill request to provider for review/approval because:  Drug not on the [facility] refill protocol     Karissa Whitley RN   [facility] -- Triage Nurse

## 2021-07-16 NOTE — TELEPHONE ENCOUNTER
Responded to patient via mychart, closing encounter as no further action is needed    Ned Sethi RN

## 2021-07-26 DIAGNOSIS — F41.9 ANXIETY: ICD-10-CM

## 2021-07-26 DIAGNOSIS — F40.10 SOCIAL PHOBIA: ICD-10-CM

## 2021-07-26 DIAGNOSIS — F33.41 RECURRENT MAJOR DEPRESSIVE DISORDER, IN PARTIAL REMISSION (H): ICD-10-CM

## 2021-07-26 RX ORDER — PAROXETINE 40 MG/1
40 TABLET, FILM COATED ORAL EVERY MORNING
Qty: 30 TABLET | Refills: 0 | Status: SHIPPED | OUTPATIENT
Start: 2021-07-26 | End: 2021-08-04

## 2021-07-26 NOTE — TELEPHONE ENCOUNTER
Patient does have upcoming appointment 8/4/21 w/ PCP. Prescription approved per [facility] Refill Protocol.     Aaron PEDERSEN RN

## 2021-08-04 ENCOUNTER — OFFICE VISIT (OUTPATIENT)
Dept: FAMILY MEDICINE | Facility: CLINIC | Age: 31
End: 2021-08-04
Payer: COMMERCIAL

## 2021-08-04 VITALS
DIASTOLIC BLOOD PRESSURE: 82 MMHG | WEIGHT: 185 LBS | OXYGEN SATURATION: 99 % | HEIGHT: 65 IN | HEART RATE: 80 BPM | SYSTOLIC BLOOD PRESSURE: 123 MMHG | TEMPERATURE: 98.4 F | BODY MASS INDEX: 30.82 KG/M2

## 2021-08-04 DIAGNOSIS — Z30.09 COUNSELING FOR BIRTH CONTROL, ORAL CONTRACEPTIVES: ICD-10-CM

## 2021-08-04 DIAGNOSIS — Z00.00 ROUTINE GENERAL MEDICAL EXAMINATION AT A HEALTH CARE FACILITY: ICD-10-CM

## 2021-08-04 DIAGNOSIS — F90.9 ATTENTION DEFICIT HYPERACTIVITY DISORDER (ADHD), UNSPECIFIED ADHD TYPE: Primary | ICD-10-CM

## 2021-08-04 DIAGNOSIS — F41.9 ANXIETY: ICD-10-CM

## 2021-08-04 DIAGNOSIS — F40.10 SOCIAL PHOBIA: ICD-10-CM

## 2021-08-04 DIAGNOSIS — F33.41 RECURRENT MAJOR DEPRESSIVE DISORDER, IN PARTIAL REMISSION (H): ICD-10-CM

## 2021-08-04 PROCEDURE — 99395 PREV VISIT EST AGE 18-39: CPT | Performed by: PHYSICIAN ASSISTANT

## 2021-08-04 RX ORDER — PAROXETINE 40 MG/1
40 TABLET, FILM COATED ORAL EVERY MORNING
Qty: 90 TABLET | Refills: 3 | Status: SHIPPED | OUTPATIENT
Start: 2021-08-04 | End: 2022-08-09

## 2021-08-04 RX ORDER — NORETHINDRONE ACETATE AND ETHINYL ESTRADIOL 1; 20 MG/1; UG/1
1 TABLET ORAL DAILY
COMMUNITY
Start: 2021-06-17 | End: 2021-08-04

## 2021-08-04 RX ORDER — METHYLPHENIDATE HYDROCHLORIDE 36 MG/1
36 TABLET ORAL DAILY
Qty: 30 TABLET | Refills: 0 | Status: SHIPPED | OUTPATIENT
Start: 2021-08-04 | End: 2021-09-03

## 2021-08-04 RX ORDER — NORETHINDRONE ACETATE AND ETHINYL ESTRADIOL 1; 20 MG/1; UG/1
1 TABLET ORAL DAILY
Qty: 84 TABLET | Refills: 3 | Status: SHIPPED | OUTPATIENT
Start: 2021-08-04 | End: 2022-09-07

## 2021-08-04 RX ORDER — METHYLPHENIDATE HYDROCHLORIDE 36 MG/1
36 TABLET ORAL DAILY
Qty: 30 TABLET | Refills: 0 | Status: SHIPPED | OUTPATIENT
Start: 2021-10-05 | End: 2021-11-04

## 2021-08-04 RX ORDER — METHYLPHENIDATE HYDROCHLORIDE 36 MG/1
36 TABLET ORAL DAILY
Qty: 30 TABLET | Refills: 0 | Status: SHIPPED | OUTPATIENT
Start: 2021-09-04 | End: 2021-10-04

## 2021-08-04 ASSESSMENT — ENCOUNTER SYMPTOMS
SORE THROAT: 0
DIARRHEA: 0
ARTHRALGIAS: 0
MYALGIAS: 0
SHORTNESS OF BREATH: 0
HEMATOCHEZIA: 0
COUGH: 0
FREQUENCY: 0
EYE PAIN: 0
NERVOUS/ANXIOUS: 0
PARESTHESIAS: 0
HEMATURIA: 0
BREAST MASS: 0
CHILLS: 0
HEADACHES: 0
WEAKNESS: 0
FEVER: 0
DIZZINESS: 0
ABDOMINAL PAIN: 0
CONSTIPATION: 0
HEARTBURN: 0
NAUSEA: 0
PALPITATIONS: 0
JOINT SWELLING: 0
DYSURIA: 0

## 2021-08-04 ASSESSMENT — MIFFLIN-ST. JEOR: SCORE: 1555.03

## 2021-08-04 ASSESSMENT — ANXIETY QUESTIONNAIRES
7. FEELING AFRAID AS IF SOMETHING AWFUL MIGHT HAPPEN: SEVERAL DAYS
IF YOU CHECKED OFF ANY PROBLEMS ON THIS QUESTIONNAIRE, HOW DIFFICULT HAVE THESE PROBLEMS MADE IT FOR YOU TO DO YOUR WORK, TAKE CARE OF THINGS AT HOME, OR GET ALONG WITH OTHER PEOPLE: SOMEWHAT DIFFICULT
1. FEELING NERVOUS, ANXIOUS, OR ON EDGE: SEVERAL DAYS
GAD7 TOTAL SCORE: 5
3. WORRYING TOO MUCH ABOUT DIFFERENT THINGS: SEVERAL DAYS
2. NOT BEING ABLE TO STOP OR CONTROL WORRYING: SEVERAL DAYS
7. FEELING AFRAID AS IF SOMETHING AWFUL MIGHT HAPPEN: SEVERAL DAYS
1. FEELING NERVOUS, ANXIOUS, OR ON EDGE: SEVERAL DAYS
GAD7 TOTAL SCORE: 5
6. BECOMING EASILY ANNOYED OR IRRITABLE: SEVERAL DAYS
2. NOT BEING ABLE TO STOP OR CONTROL WORRYING: SEVERAL DAYS
6. BECOMING EASILY ANNOYED OR IRRITABLE: SEVERAL DAYS
IF YOU CHECKED OFF ANY PROBLEMS ON THIS QUESTIONNAIRE, HOW DIFFICULT HAVE THESE PROBLEMS MADE IT FOR YOU TO DO YOUR WORK, TAKE CARE OF THINGS AT HOME, OR GET ALONG WITH OTHER PEOPLE: SOMEWHAT DIFFICULT
5. BEING SO RESTLESS THAT IT IS HARD TO SIT STILL: NOT AT ALL
3. WORRYING TOO MUCH ABOUT DIFFERENT THINGS: SEVERAL DAYS
5. BEING SO RESTLESS THAT IT IS HARD TO SIT STILL: NOT AT ALL

## 2021-08-04 ASSESSMENT — PATIENT HEALTH QUESTIONNAIRE - PHQ9
5. POOR APPETITE OR OVEREATING: NOT AT ALL
SUM OF ALL RESPONSES TO PHQ QUESTIONS 1-9: 5
5. POOR APPETITE OR OVEREATING: NOT AT ALL

## 2021-08-04 NOTE — PROGRESS NOTES
SUBJECTIVE:   CC: Gayathri Gomez is an 30 year old woman who presents for preventive health visit.       Patient has been advised of split billing requirements and indicates understanding: Yes  Healthy Habits:     Getting at least 3 servings of Calcium per day:  Yes    Bi-annual eye exam:  NO    Dental care twice a year:  Yes    Sleep apnea or symptoms of sleep apnea:  None    Diet:  Regular (no restrictions)    Frequency of exercise:  2-3 days/week    Duration of exercise:  15-30 minutes    Taking medications regularly:  Yes    Medication side effects:  Other    PHQ-2 Total Score: 2    Additional concerns today:  Yes          Discuss getting the Covid vaccine / will it affect nerve pain     Today's PHQ-2 Score:   PHQ-2 ( 1999 Pfizer) 8/4/2021   Q1: Little interest or pleasure in doing things 1   Q2: Feeling down, depressed or hopeless 1   PHQ-2 Score 2   Q1: Little interest or pleasure in doing things Several days   Q2: Feeling down, depressed or hopeless Several days   PHQ-2 Score 2       Abuse: Current or Past (Physical, Sexual or Emotional) - No  Do you feel safe in your environment? Yes        Social History     Tobacco Use     Smoking status: Current Some Day Smoker     Years: 5.00     Types: Cigarettes     Smokeless tobacco: Never Used   Substance Use Topics     Alcohol use: Yes     Alcohol/week: 0.0 standard drinks     Comment: occ     If you drink alcohol do you typically have >3 drinks per day or >7 drinks per week? No    Alcohol Use 8/4/2021   Prescreen: >3 drinks/day or >7 drinks/week? Yes   Prescreen: >3 drinks/day or >7 drinks/week? -   AUDIT SCORE  3     AUDIT - Alcohol Use Disorders Identification Test - Reproduced from the World Health Organization Audit 2001 (Second Edition) 8/4/2021   1.  How often do you have a drink containing alcohol? 2 to 4 times a month   2.  How many drinks containing alcohol do you have on a typical day when you are drinking? 1 or 2   3.  How often do you have five or  more drinks on one occasion? Less than monthly   4.  How often during the last year have you found that you were not able to stop drinking once you had started? Never   5.  How often during the last year have you failed to do what was normally expected of you because of drinking? Never   6.  How often during the last year have you needed a first drink in the morning to get yourself going after a heavy drinking session? Never   7.  How often during the last year have you had a feeling of guilt or remorse after drinking? Never   8.  How often during the last year have you been unable to remember what happened the night before because of your drinking? Never   9.  Have you or someone else been injured because of your drinking? No   10. Has a relative, friend, doctor or other health care worker been concerned about your drinking or suggested you cut down? No   TOTAL SCORE 3       Reviewed orders with patient.  Reviewed health maintenance and updated orders accordingly - Yes  BP Readings from Last 3 Encounters:   08/04/21 123/82   02/17/21 126/76   07/29/20 120/82    Wt Readings from Last 3 Encounters:   08/04/21 83.9 kg (185 lb)   02/17/21 81.6 kg (180 lb)   12/23/19 81.6 kg (180 lb)                  Recent Labs   Lab Test 01/27/21  0905 11/18/19  0824 11/17/19  1741 08/05/19  0856 04/08/19  0905 06/26/17  0916   *  --   --   --  149* 152*   HDL 61  --   --   --  68 68   TRIG 55  --   --   --  55 131   ALT  --   --   --   --  22  --    CR  --  0.69 0.87  --  0.72  --    GFRESTIMATED  --  >90 89  --  >90  --    GFRESTBLACK  --  >90 >90  --  >90  --    POTASSIUM  --  3.6 3.5  --  3.1*  --    TSH 2.97  --   --  2.36  --   --         Breast Cancer Screening:  Any new diagnosis of family breast, ovarian, or bowel cancer? No    FHS-7: No flowsheet data found.  click delete button to remove this line now  Patient under 40 years of age: Routine Mammogram Screening not recommended.   Pertinent mammograms are reviewed  "under the imaging tab.    History of abnormal Pap smear: NO - age 30- 65 PAP every 3 years recommended  PAP / HPV 4/8/2019 5/19/2016 2/5/2013   PAP (Historical) NIL NIL NIL     Reviewed and updated as needed this visit by clinical staff  Tobacco  Allergies    Med Hx  Surg Hx  Fam Hx  Soc Hx        Reviewed and updated as needed this visit by Provider                    Review of Systems   Constitutional: Negative for chills and fever.   HENT: Negative for congestion, ear pain, hearing loss and sore throat.    Eyes: Negative for pain and visual disturbance.   Respiratory: Negative for cough and shortness of breath.    Cardiovascular: Negative for chest pain, palpitations and peripheral edema.   Gastrointestinal: Negative for abdominal pain, constipation, diarrhea, heartburn, hematochezia and nausea.   Breasts:  Negative for tenderness, breast mass and discharge.   Genitourinary: Negative for dysuria, frequency, genital sores, hematuria, pelvic pain, urgency, vaginal bleeding and vaginal discharge.   Musculoskeletal: Negative for arthralgias, joint swelling and myalgias.   Skin: Negative for rash.   Neurological: Negative for dizziness, weakness, headaches and paresthesias.   Psychiatric/Behavioral: Negative for mood changes. The patient is not nervous/anxious.           OBJECTIVE:   /82 (BP Location: Right arm, Patient Position: Chair, Cuff Size: Adult Large)   Pulse 80   Temp 98.4  F (36.9  C) (Oral)   Ht 1.651 m (5' 5\")   Wt 83.9 kg (185 lb)   SpO2 99%   BMI 30.79 kg/m    Physical Exam  GENERAL: healthy, alert and no distress  EYES: Eyes grossly normal to inspection, PERRL and conjunctivae and sclerae normal  HENT: ear canals and TM's normal, nose and mouth without ulcers or lesions  NECK: no adenopathy, no asymmetry, masses, or scars and thyroid normal to palpation  RESP: lungs clear to auscultation - no rales, rhonchi or wheezes  BREAST: normal without masses, tenderness or nipple discharge and " no palpable axillary masses or adenopathy  CV: regular rate and rhythm, normal S1 S2, no S3 or S4, no murmur, click or rub, no peripheral edema and peripheral pulses strong  ABDOMEN: soft, nontender, no hepatosplenomegaly, no masses and bowel sounds normal  MS: no gross musculoskeletal defects noted, no edema  SKIN: no suspicious lesions or rashes  NEURO: Normal strength and tone, mentation intact and speech normal  PSYCH: mentation appears normal, affect normal/bright  LYMPH: no cervical, supraclavicular, axillary, or inguinal adenopathy    Diagnostic Test Results:  Labs reviewed in Epic    ASSESSMENT/PLAN:   1. Routine general medical examination at a health care facility          3. Anxiety  Stable   - PARoxetine (PAXIL) 40 MG tablet; Take 1 tablet (40 mg) by mouth every morning  Dispense: 90 tablet; Refill: 3    4. Recurrent major depressive disorder, in partial remission (H)    - PARoxetine (PAXIL) 40 MG tablet; Take 1 tablet (40 mg) by mouth every morning  Dispense: 90 tablet; Refill: 3    5. Social phobia    - PARoxetine (PAXIL) 40 MG tablet; Take 1 tablet (40 mg) by mouth every morning  Dispense: 90 tablet; Refill: 3    6. Attention deficit hyperactivity disorder (ADHD), unspecified ADHD type  Stable follow-up 6 months   - methylphenidate (CONCERTA) 36 MG CR tablet; Take 1 tablet (36 mg) by mouth daily  Dispense: 30 tablet; Refill: 0  - methylphenidate (CONCERTA) 36 MG CR tablet; Take 1 tablet (36 mg) by mouth daily  Dispense: 30 tablet; Refill: 0  - methylphenidate (CONCERTA) 36 MG CR tablet; Take 1 tablet (36 mg) by mouth daily  Dispense: 30 tablet; Refill: 0    7. Counseling for birth control, oral contraceptives    - JUNEL 1/20 1-20 MG-MCG tablet; Take 1 tablet by mouth daily  Dispense: 84 tablet; Refill: 3    Patient has been advised of split billing requirements and indicates understanding: Yes  COUNSELING:  Reviewed preventive health counseling, as reflected in patient instructions       Regular  "exercise       Healthy diet/nutrition       Vision screening       Contraception    Estimated body mass index is 30.79 kg/m  as calculated from the following:    Height as of this encounter: 1.651 m (5' 5\").    Weight as of this encounter: 83.9 kg (185 lb).        She reports that she has been smoking cigarettes. She has smoked for the past 5.00 years. She has never used smokeless tobacco.  Tobacco Cessation Action Plan:   Information offered: Patient not interested at this time      Counseling Resources:  ATP IV Guidelines  Pooled Cohorts Equation Calculator  Breast Cancer Risk Calculator  BRCA-Related Cancer Risk Assessment: FHS-7 Tool  FRAX Risk Assessment  ICSI Preventive Guidelines  Dietary Guidelines for Americans, 2010  USDA's MyPlate  ASA Prophylaxis  Lung CA Screening    Ramona Ann Aaseby-Aguilera, PA-C  [facility]  "

## 2021-08-04 NOTE — PATIENT INSTRUCTIONS
(F90.9) Attention deficit hyperactivity disorder (ADHD), unspecified ADHD type  (primary encounter diagnosis)  Comment: works well  Follow-up in 6 months   Plan: methylphenidate (CONCERTA) 36 MG CR tablet,         methylphenidate (CONCERTA) 36 MG CR tablet,         methylphenidate (CONCERTA) 36 MG CR tablet            (Z00.00) Routine general medical examination at a health care facility  Comment:   Plan:         (F41.9) Anxiety  Comment: stable   Plan: PARoxetine (PAXIL) 40 MG tablet            (F33.41) Recurrent major depressive disorder, in partial remission (H)  Comment:   Plan: PARoxetine (PAXIL) 40 MG tablet            (F40.10) Social phobia  Comment:   Plan: PARoxetine (PAXIL) 40 MG tablet            (Z30.09) Counseling for birth control, oral contraceptives  Comment:   Plan: JUNEL 1/20 1-20 MG-MCG tablet              Preventive Health Recommendations  Female Ages 26 - 39  Yearly exam:   See your health care provider every year in order to    Review health changes.     Discuss preventive care.      Review your medicines if you your doctor has prescribed any.    Until age 30: Get a Pap test every three years (more often if you have had an abnormal result).    After age 30: Talk to your doctor about whether you should have a Pap test every 3 years or have a Pap test with HPV screening every 5 years.   You do not need a Pap test if your uterus was removed (hysterectomy) and you have not had cancer.  You should be tested each year for STDs (sexually transmitted diseases), if you're at risk.   Talk to your provider about how often to have your cholesterol checked.  If you are at risk for diabetes, you should have a diabetes test (fasting glucose).  Shots: Get a flu shot each year. Get a tetanus shot every 10 years.   Nutrition:     Eat at least 5 servings of fruits and vegetables each day.    Eat whole-grain bread, whole-wheat pasta and brown rice instead of white grains and rice.    Get adequate Calcium and  Vitamin D.     Lifestyle    Exercise at least 150 minutes a week (30 minutes a day, 5 days of the week). This will help you control your weight and prevent disease.    Limit alcohol to one drink per day.    No smoking.     Wear sunscreen to prevent skin cancer.    See your dentist every six months for an exam and cleaning.

## 2021-08-05 ASSESSMENT — ANXIETY QUESTIONNAIRES: GAD7 TOTAL SCORE: 5

## 2021-08-27 ENCOUNTER — NURSE TRIAGE (OUTPATIENT)
Dept: FAMILY MEDICINE | Facility: CLINIC | Age: 31
End: 2021-08-27

## 2021-08-27 ENCOUNTER — MYC MEDICAL ADVICE (OUTPATIENT)
Dept: FAMILY MEDICINE | Facility: CLINIC | Age: 31
End: 2021-08-27

## 2021-08-27 NOTE — TELEPHONE ENCOUNTER
"S-(situation): RN calling to triage symptoms from [employer] message sent 8/27/21.     B-(background): Patient notes hx of blood clots, had blood clot in arm following a surgery in the past. No injury or bite at the site.    A-(assessment): Patient experiencing mild swelling in left lower leg. Pain in calf rated 8/10 when waking up in the morning, improves throughout the day to 4/10. Painful to touch and more painful with plantar flexion than dorsiflexion.  Compression socks help. No redness, not warm to touch, no chest pain, no difficulty breathing, no cough. Patient states \"feels like constant beginning of [employer]\".     R-(recommendations): Per protocol and huddle with provider, advised ED today. Patient verbalized understanding and agreed with plan. Patient plans to go to ED today.       Reason for Disposition    Thigh, calf, or ankle swelling in only one leg    Thigh or calf pain and only 1 side and present > 1 hour    Additional Information    Negative: SEVERE swelling (e.g., swelling extends above knee, entire leg is swollen, weeping fluid)    Negative: Difficulty breathing at rest    Negative: Entire foot is cool or blue in comparison to other side    Negative: Chest pain    Negative: Small area of swelling and followed an insect bite to the area    Negative: Followed a knee injury    Negative: Ankle or foot injury    Negative: Pregnant with leg swelling or edema    Negative: Sounds like a life-threatening emergency to the triager    Answer Assessment - Initial Assessment Questions  1. ONSET: \"When did the swelling start?\" (e.g., minutes, hours, days)      Last week   2. LOCATION: \"What part of the leg is swollen?\"  \"Are both legs swollen or just one leg?\"      Left lower leg, radiates from ankle to knee, calf pain  3. SEVERITY: \"How bad is the swelling?\" (e.g., localized; mild, moderate, severe)   - Localized - small area of swelling localized to one leg   - MILD pedal edema - swelling limited to foot and " "ankle, pitting edema < 1/4 inch (6 mm) deep, rest and elevation eliminate most or all swelling   - MODERATE edema - swelling of lower leg to knee, pitting edema > 1/4 inch (6 mm) deep, rest and elevation only partially reduce swelling   - SEVERE edema - swelling extends above knee, facial or hand swelling present       4/10, but when waking up in the morning is 8/10 (can barely walk). Starts to feel better when moving around   4. REDNESS: \"Does the swelling look red or infected?\"      No redness   5. PAIN: \"Is the swelling painful to touch?\" If so, ask: \"How painful is it?\"   (Scale 1-10; mild, moderate or severe)      Painful to touch  6. FEVER: \"Do you have a fever?\" If so, ask: \"What is it, how was it measured, and when did it start?\"       No fever  7. CAUSE: \"What do you think is causing the leg swelling?\"      Possible DVT  8. MEDICAL HISTORY: \"Do you have a history of heart failure, kidney disease, liver failure, or cancer?\"      no  9. RECURRENT SYMPTOM: \"Have you had leg swelling before?\" If so, ask: \"When was the last time?\" \"What happened that time?\"      no  10. OTHER SYMPTOMS: \"Do you have any other symptoms?\" (e.g., chest pain, difficulty breathing)        No chest pain, no difficulty breathing, no cough  11. PREGNANCY: \"Is there any chance you are pregnant?\" \"When was your last menstrual period?\"        No    Protocols used: LEG SWELLING AND EDEMA-A-JAYLON PEDERSEN RN    "

## 2021-10-02 ENCOUNTER — HEALTH MAINTENANCE LETTER (OUTPATIENT)
Age: 31
End: 2021-10-02

## 2021-12-01 ENCOUNTER — MYC REFILL (OUTPATIENT)
Dept: FAMILY MEDICINE | Facility: CLINIC | Age: 31
End: 2021-12-01
Payer: COMMERCIAL

## 2021-12-01 DIAGNOSIS — F90.9 ATTENTION DEFICIT HYPERACTIVITY DISORDER (ADHD), UNSPECIFIED ADHD TYPE: ICD-10-CM

## 2021-12-02 RX ORDER — METHYLPHENIDATE HYDROCHLORIDE 36 MG/1
36 TABLET ORAL DAILY
Qty: 30 TABLET | Refills: 0 | Status: SHIPPED | OUTPATIENT
Start: 2021-12-02 | End: 2022-01-12

## 2021-12-12 DIAGNOSIS — F40.10 SOCIAL PHOBIA: ICD-10-CM

## 2021-12-13 RX ORDER — PROPRANOLOL HCL 60 MG
CAPSULE, EXTENDED RELEASE 24HR ORAL
Qty: 30 CAPSULE | Refills: 1 | Status: SHIPPED | OUTPATIENT
Start: 2021-12-13 | End: 2022-01-14

## 2022-01-12 ENCOUNTER — MYC REFILL (OUTPATIENT)
Dept: FAMILY MEDICINE | Facility: CLINIC | Age: 32
End: 2022-01-12
Payer: COMMERCIAL

## 2022-01-12 DIAGNOSIS — F90.9 ATTENTION DEFICIT HYPERACTIVITY DISORDER (ADHD), UNSPECIFIED ADHD TYPE: ICD-10-CM

## 2022-01-12 RX ORDER — METHYLPHENIDATE HYDROCHLORIDE 36 MG/1
36 TABLET ORAL DAILY
Qty: 30 TABLET | Refills: 0 | Status: SHIPPED | OUTPATIENT
Start: 2022-01-12 | End: 2022-02-17

## 2022-01-13 DIAGNOSIS — F40.10 SOCIAL PHOBIA: ICD-10-CM

## 2022-01-14 RX ORDER — PROPRANOLOL HCL 60 MG
CAPSULE, EXTENDED RELEASE 24HR ORAL
Qty: 30 CAPSULE | Refills: 3 | Status: SHIPPED | OUTPATIENT
Start: 2022-01-14 | End: 2022-04-06

## 2022-01-14 NOTE — TELEPHONE ENCOUNTER
Prescription approved per [facility] Refill Protocol.  Jaylyn LINDSAY RN

## 2022-02-17 ENCOUNTER — MYC REFILL (OUTPATIENT)
Dept: FAMILY MEDICINE | Facility: CLINIC | Age: 32
End: 2022-02-17
Payer: COMMERCIAL

## 2022-02-17 DIAGNOSIS — F90.9 ATTENTION DEFICIT HYPERACTIVITY DISORDER (ADHD), UNSPECIFIED ADHD TYPE: ICD-10-CM

## 2022-02-17 RX ORDER — METHYLPHENIDATE HYDROCHLORIDE 36 MG/1
36 TABLET ORAL DAILY
Qty: 30 TABLET | Refills: 0 | Status: SHIPPED | OUTPATIENT
Start: 2022-02-17 | End: 2022-03-25

## 2022-03-17 ENCOUNTER — TELEPHONE (OUTPATIENT)
Dept: FAMILY MEDICINE | Facility: CLINIC | Age: 32
End: 2022-03-17
Payer: COMMERCIAL

## 2022-03-17 DIAGNOSIS — E03.9 HYPOTHYROIDISM, UNSPECIFIED TYPE: Primary | ICD-10-CM

## 2022-03-17 DIAGNOSIS — E03.9 HYPOTHYROIDISM, UNSPECIFIED TYPE: ICD-10-CM

## 2022-03-17 RX ORDER — LEVOTHYROXINE SODIUM 50 UG/1
50 TABLET ORAL DAILY
Qty: 90 TABLET | Refills: 0 | Status: SHIPPED | OUTPATIENT
Start: 2022-03-17 | End: 2022-07-06

## 2022-03-17 NOTE — TELEPHONE ENCOUNTER
Prescription approved per [facility] Refill Protocol.  TSH   Date Value Ref Range Status   01/27/2021 2.97 0.40 - 4.00 mU/L Final     Renuka Armando RN

## 2022-03-25 ENCOUNTER — MYC REFILL (OUTPATIENT)
Dept: FAMILY MEDICINE | Facility: CLINIC | Age: 32
End: 2022-03-25
Payer: COMMERCIAL

## 2022-03-25 DIAGNOSIS — F90.9 ATTENTION DEFICIT HYPERACTIVITY DISORDER (ADHD), UNSPECIFIED ADHD TYPE: ICD-10-CM

## 2022-03-25 RX ORDER — METHYLPHENIDATE HYDROCHLORIDE 36 MG/1
36 TABLET ORAL DAILY
Qty: 30 TABLET | Refills: 0 | Status: SHIPPED | OUTPATIENT
Start: 2022-03-25 | End: 2022-08-12

## 2022-04-06 ENCOUNTER — OFFICE VISIT (OUTPATIENT)
Dept: FAMILY MEDICINE | Facility: CLINIC | Age: 32
End: 2022-04-06
Payer: COMMERCIAL

## 2022-04-06 VITALS
TEMPERATURE: 98.7 F | DIASTOLIC BLOOD PRESSURE: 83 MMHG | BODY MASS INDEX: 31.65 KG/M2 | WEIGHT: 190 LBS | HEIGHT: 65 IN | SYSTOLIC BLOOD PRESSURE: 116 MMHG | HEART RATE: 78 BPM | RESPIRATION RATE: 14 BRPM

## 2022-04-06 DIAGNOSIS — K21.9 GASTROESOPHAGEAL REFLUX DISEASE WITHOUT ESOPHAGITIS: ICD-10-CM

## 2022-04-06 DIAGNOSIS — Z11.59 NEED FOR HEPATITIS C SCREENING TEST: ICD-10-CM

## 2022-04-06 DIAGNOSIS — F33.41 RECURRENT MAJOR DEPRESSIVE DISORDER, IN PARTIAL REMISSION (H): ICD-10-CM

## 2022-04-06 DIAGNOSIS — Z12.4 CERVICAL CANCER SCREENING: ICD-10-CM

## 2022-04-06 DIAGNOSIS — F90.0 ATTENTION DEFICIT HYPERACTIVITY DISORDER (ADHD), PREDOMINANTLY INATTENTIVE TYPE: Primary | ICD-10-CM

## 2022-04-06 DIAGNOSIS — E03.9 HYPOTHYROIDISM, UNSPECIFIED TYPE: ICD-10-CM

## 2022-04-06 DIAGNOSIS — F40.10 SOCIAL PHOBIA: ICD-10-CM

## 2022-04-06 PROBLEM — G95.20 UNSPECIFIED CORD COMPRESSION (H): Status: RESOLVED | Noted: 2021-02-17 | Resolved: 2022-04-06

## 2022-04-06 PROBLEM — S06.9X9A UNSPECIFIED INTRACRANIAL INJURY WITH LOSS OF CONSCIOUSNESS OF UNSPECIFIED DURATION, INITIAL ENCOUNTER (H): Status: RESOLVED | Noted: 2021-02-17 | Resolved: 2022-04-06

## 2022-04-06 PROCEDURE — 99214 OFFICE O/P EST MOD 30 MIN: CPT | Performed by: PHYSICIAN ASSISTANT

## 2022-04-06 PROCEDURE — 84443 ASSAY THYROID STIM HORMONE: CPT | Performed by: PHYSICIAN ASSISTANT

## 2022-04-06 PROCEDURE — 86803 HEPATITIS C AB TEST: CPT | Performed by: PHYSICIAN ASSISTANT

## 2022-04-06 PROCEDURE — 36415 COLL VENOUS BLD VENIPUNCTURE: CPT | Performed by: PHYSICIAN ASSISTANT

## 2022-04-06 RX ORDER — METHYLPHENIDATE HYDROCHLORIDE 36 MG/1
36 TABLET ORAL DAILY
Qty: 30 TABLET | Refills: 0 | Status: SHIPPED | OUTPATIENT
Start: 2022-06-07 | End: 2022-07-07

## 2022-04-06 RX ORDER — METHYLPHENIDATE HYDROCHLORIDE 36 MG/1
36 TABLET ORAL DAILY
Qty: 30 TABLET | Refills: 0 | Status: SHIPPED | OUTPATIENT
Start: 2022-04-06 | End: 2022-05-06

## 2022-04-06 RX ORDER — PROPRANOLOL HCL 60 MG
60 CAPSULE, EXTENDED RELEASE 24HR ORAL DAILY
Qty: 90 CAPSULE | Refills: 3 | Status: SHIPPED | OUTPATIENT
Start: 2022-04-06 | End: 2023-02-01

## 2022-04-06 RX ORDER — METHYLPHENIDATE HYDROCHLORIDE 36 MG/1
36 TABLET ORAL DAILY
Qty: 30 TABLET | Refills: 0 | Status: SHIPPED | OUTPATIENT
Start: 2022-05-07 | End: 2022-06-06

## 2022-04-06 NOTE — PATIENT INSTRUCTIONS
(F90.0) Attention deficit hyperactivity disorder (ADHD), predominantly inattentive type  (primary encounter diagnosis)  Comment: stable and works well  Plan: methylphenidate (CONCERTA) 36 MG CR tablet,         methylphenidate (CONCERTA) 36 MG CR tablet,         methylphenidate (CONCERTA) 36 MG CR tablet        \  (Z11.59) Need for hepatitis C screening test  Comment:   Plan: Hepatitis C Screen Reflex to HCV RNA Quant and         Genotype                (F33.41) Recurrent major depressive disorder, in partial remission (H)  Comment:   Plan: stable     (E03.9) Hypothyroidism, unspecified type  Comment:   Plan: well recheck tsh today and determine plan     (F40.10) Social phobia  Comment: works well   Plan: propranolol ER (INDERAL LA) 60 MG 24 hr capsule            (K21.9) Gastroesophageal reflux disease without esophagitis  Comment: pt request   Plan: omeprazole (PRILOSEC) 20 MG DR capsule

## 2022-04-06 NOTE — PROGRESS NOTES
"  Assessment & Plan     Need for hepatitis C screening test    - Hepatitis C Screen Reflex to HCV RNA Quant and Genotype        Recurrent major depressive disorder, in partial remission (H)  Stable.  Although PHQ reflex suicidal ideation she states she feels depression controlled and no plan in place     Hypothyroidism, unspecified type  Will contact patient with results when known and determine plan    - TSH with free T4 reflex    Social phobia  Stable and works well   - propranolol ER (INDERAL LA) 60 MG 24 hr capsule; Take 1 capsule (60 mg) by mouth daily    Attention deficit hyperactivity disorder (ADHD), predominantly inattentive type  Symptoms stable and well controlled with current medication.  Please follow-up for office visit in 6 months   - methylphenidate (CONCERTA) 36 MG CR tablet; Take 1 tablet (36 mg) by mouth daily  - methylphenidate (CONCERTA) 36 MG CR tablet; Take 1 tablet (36 mg) by mouth daily  - methylphenidate (CONCERTA) 36 MG CR tablet; Take 1 tablet (36 mg) by mouth daily    Gastroesophageal reflux disease without esophagitis  Pt request   - omeprazole (PRILOSEC) 20 MG DR capsule; Take 1 capsule (20 mg) by mouth daily    BMI:   Estimated body mass index is 31.62 kg/m  as calculated from the following:    Height as of this encounter: 1.651 m (5' 5\").    Weight as of this encounter: 86.2 kg (190 lb).   Weight management plan: Discussed healthy diet and exercise guidelines    Depression Screening Follow Up    PHQ 4/6/2022   PHQ-9 Total Score 5   Q9: Thoughts of better off dead/self-harm past 2 weeks Several days   F/U: Thoughts of suicide or self-harm Yes   F/U: Self harm-plan No   F/U: Self-harm action No   F/U: Safety concerns No                 Follow Up    Follow Up Actions Taken          Return in about 2 months (around 6/6/2022) for Physical Exam.    Ramona Ann Aaseby-Aguilera, PA-C  [facility]    Nilay Trinh is a 31 year old who presents for the following health " "issues     ADHD: Taking  concerta 36   Working well at this dosage.  Denies tics, palpitations, insomnia or trouble with appetite      GERD: taking pepcid over the counter and not helpful.  Would like PPI       History of Present Illness       Mental Health Follow-up:                    Today's PHQ-9         PHQ-9 Total Score: 5  PHQ-9 Q9 Thoughts of better off dead/self-harm past 2 weeks :   (P) Several days  Thoughts of suicide or self harm: (P) Yes  Self-harm Plan:   (P) No  Self-harm Action:     (P) No  Safety concerns for self or others: (P) No    How difficult have these problems made it for you to do your work, take care of things at home, or get along with other people: Somewhat difficult        Hypothyroidism:     Since last visit, patient describes the following symptoms::  Anxiety, Depression and Dry skin    Reason for visit:  [facility] thyroid lab    She eats 2-3 servings of fruits and vegetables daily.She consumes 1 sweetened beverage(s) daily.She exercises with enough effort to increase her heart rate 20 to 29 minutes per day.  She exercises with enough effort to increase her heart rate 3 or less days per week.   She is taking medications regularly.             Review of Systems   Constitutional, HEENT, cardiovascular, pulmonary, gi and gu systems are negative, except as otherwise noted.      Objective    /83 (BP Location: Right arm, Patient Position: Sitting, Cuff Size: Adult Large)   Pulse 78   Temp 98.7  F (37.1  C) (Oral)   Resp 14   Ht 1.651 m (5' 5\")   Wt 86.2 kg (190 lb)   Breastfeeding No   BMI 31.62 kg/m    Body mass index is 31.62 kg/m .  Physical Exam   GENERAL: healthy, alert and no distress  HENT: ear canals and TM's normal, nose and mouth without ulcers or lesions  RESP: lungs clear to auscultation - no rales, rhonchi or wheezes  CV: regular rate and rhythm, normal S1 S2, no S3 or S4, no murmur, click or rub, no peripheral edema and peripheral pulses strong  PSYCH: mentation " appears normal, affect normal/bright

## 2022-04-07 LAB
HCV AB SERPL QL IA: NONREACTIVE
TSH SERPL DL<=0.005 MIU/L-ACNC: 2.74 MU/L (ref 0.4–4)

## 2022-04-07 ASSESSMENT — PATIENT HEALTH QUESTIONNAIRE - PHQ9: SUM OF ALL RESPONSES TO PHQ QUESTIONS 1-9: 5

## 2022-07-13 ENCOUNTER — TELEPHONE (OUTPATIENT)
Dept: FAMILY MEDICINE | Facility: CLINIC | Age: 32
End: 2022-07-13

## 2022-07-13 NOTE — TELEPHONE ENCOUNTER
Summary:    Patient is due/failing the following:   PAP    Reviewed:  [] CARE EVERYWHERE  [] LAST OV NOTE INCLUDING ENDO  [] FYI TAB  [] MYCHART ACTIVE?  [] LAST PANEL ENCOUNTER  [] FUTURE APPTS  [] IMMUNIZATIONS          Action needed:   Patient needs office visit for pap.    Type of outreach:    Phone, left message for patient to call back.  and Sent MyChart message.                                                                               Yolanda Sepulveda/KAREN  [place]---[facility]

## 2022-07-26 ENCOUNTER — MYC MEDICAL ADVICE (OUTPATIENT)
Dept: FAMILY MEDICINE | Facility: CLINIC | Age: 32
End: 2022-07-26

## 2022-07-28 NOTE — TELEPHONE ENCOUNTER
Summary:    Patient is due/failing the following:   PAP    Reviewed:  [] CARE EVERYWHERE  [] LAST OV NOTE INCLUDING ENDO  [] FYI TAB  [] MYCHART ACTIVE?  [] LAST PANEL ENCOUNTER  [] FUTURE APPTS  [] IMMUNIZATIONS          Action needed:   Patient needs office visit for pap.    Type of outreach:    per patient has an appt in 09/2022                                                                               Yolanda Sepulveda/KAREN  [place]---[facility]

## 2022-08-12 ENCOUNTER — MYC REFILL (OUTPATIENT)
Dept: FAMILY MEDICINE | Facility: CLINIC | Age: 32
End: 2022-08-12

## 2022-08-12 DIAGNOSIS — F90.9 ATTENTION DEFICIT HYPERACTIVITY DISORDER (ADHD), UNSPECIFIED ADHD TYPE: ICD-10-CM

## 2022-08-15 RX ORDER — METHYLPHENIDATE HYDROCHLORIDE 36 MG/1
36 TABLET ORAL DAILY
Qty: 30 TABLET | Refills: 0 | Status: SHIPPED | OUTPATIENT
Start: 2022-08-15 | End: 2022-12-28

## 2022-09-03 ENCOUNTER — HEALTH MAINTENANCE LETTER (OUTPATIENT)
Age: 32
End: 2022-09-03

## 2022-09-07 ENCOUNTER — OFFICE VISIT (OUTPATIENT)
Dept: FAMILY MEDICINE | Facility: CLINIC | Age: 32
End: 2022-09-07
Payer: COMMERCIAL

## 2022-09-07 VITALS
DIASTOLIC BLOOD PRESSURE: 79 MMHG | BODY MASS INDEX: 36.54 KG/M2 | WEIGHT: 214 LBS | HEIGHT: 64 IN | TEMPERATURE: 98.3 F | RESPIRATION RATE: 14 BRPM | SYSTOLIC BLOOD PRESSURE: 116 MMHG | HEART RATE: 62 BPM

## 2022-09-07 DIAGNOSIS — F33.41 RECURRENT MAJOR DEPRESSIVE DISORDER, IN PARTIAL REMISSION (H): ICD-10-CM

## 2022-09-07 DIAGNOSIS — Z12.4 CERVICAL CANCER SCREENING: ICD-10-CM

## 2022-09-07 DIAGNOSIS — F90.0 ATTENTION DEFICIT HYPERACTIVITY DISORDER (ADHD), PREDOMINANTLY INATTENTIVE TYPE: Primary | ICD-10-CM

## 2022-09-07 DIAGNOSIS — F41.9 ANXIETY: ICD-10-CM

## 2022-09-07 DIAGNOSIS — F40.10 SOCIAL PHOBIA: ICD-10-CM

## 2022-09-07 DIAGNOSIS — Z30.09 COUNSELING FOR BIRTH CONTROL, ORAL CONTRACEPTIVES: ICD-10-CM

## 2022-09-07 DIAGNOSIS — Z00.00 ROUTINE GENERAL MEDICAL EXAMINATION AT A HEALTH CARE FACILITY: ICD-10-CM

## 2022-09-07 PROCEDURE — G0145 SCR C/V CYTO,THINLAYER,RESCR: HCPCS | Performed by: PHYSICIAN ASSISTANT

## 2022-09-07 PROCEDURE — 87624 HPV HI-RISK TYP POOLED RSLT: CPT | Performed by: PHYSICIAN ASSISTANT

## 2022-09-07 PROCEDURE — 99395 PREV VISIT EST AGE 18-39: CPT | Performed by: PHYSICIAN ASSISTANT

## 2022-09-07 RX ORDER — METHYLPHENIDATE HYDROCHLORIDE 36 MG/1
36 TABLET ORAL DAILY
Qty: 30 TABLET | Refills: 0 | Status: SHIPPED | OUTPATIENT
Start: 2022-11-08 | End: 2022-12-08

## 2022-09-07 RX ORDER — METHYLPHENIDATE HYDROCHLORIDE 36 MG/1
36 TABLET ORAL DAILY
Qty: 30 TABLET | Refills: 0 | Status: SHIPPED | OUTPATIENT
Start: 2022-10-08 | End: 2022-11-07

## 2022-09-07 RX ORDER — NORETHINDRONE ACETATE AND ETHINYL ESTRADIOL 1; 20 MG/1; UG/1
1 TABLET ORAL DAILY
Qty: 84 TABLET | Refills: 3 | Status: SHIPPED | OUTPATIENT
Start: 2022-09-07 | End: 2023-09-25

## 2022-09-07 RX ORDER — METHYLPHENIDATE HYDROCHLORIDE 36 MG/1
36 TABLET ORAL DAILY
Qty: 30 TABLET | Refills: 0 | Status: SHIPPED | OUTPATIENT
Start: 2022-09-07 | End: 2022-10-07

## 2022-09-07 RX ORDER — PAROXETINE 40 MG/1
40 TABLET, FILM COATED ORAL EVERY MORNING
Qty: 90 TABLET | Refills: 3 | Status: SHIPPED | OUTPATIENT
Start: 2022-09-07 | End: 2023-10-03

## 2022-09-07 SDOH — ECONOMIC STABILITY: FOOD INSECURITY: WITHIN THE PAST 12 MONTHS, THE FOOD YOU BOUGHT JUST DIDN'T LAST AND YOU DIDN'T HAVE MONEY TO GET MORE.: NEVER TRUE

## 2022-09-07 SDOH — HEALTH STABILITY: PHYSICAL HEALTH: ON AVERAGE, HOW MANY DAYS PER WEEK DO YOU ENGAGE IN MODERATE TO STRENUOUS EXERCISE (LIKE A BRISK WALK)?: 3 DAYS

## 2022-09-07 SDOH — ECONOMIC STABILITY: TRANSPORTATION INSECURITY
IN THE PAST 12 MONTHS, HAS LACK OF TRANSPORTATION KEPT YOU FROM MEETINGS, WORK, OR FROM GETTING THINGS NEEDED FOR DAILY LIVING?: NO

## 2022-09-07 SDOH — ECONOMIC STABILITY: TRANSPORTATION INSECURITY
IN THE PAST 12 MONTHS, HAS THE LACK OF TRANSPORTATION KEPT YOU FROM MEDICAL APPOINTMENTS OR FROM GETTING MEDICATIONS?: NO

## 2022-09-07 SDOH — HEALTH STABILITY: PHYSICAL HEALTH: ON AVERAGE, HOW MANY MINUTES DO YOU ENGAGE IN EXERCISE AT THIS LEVEL?: 30 MIN

## 2022-09-07 SDOH — ECONOMIC STABILITY: FOOD INSECURITY: WITHIN THE PAST 12 MONTHS, YOU WORRIED THAT YOUR FOOD WOULD RUN OUT BEFORE YOU GOT MONEY TO BUY MORE.: NEVER TRUE

## 2022-09-07 SDOH — ECONOMIC STABILITY: INCOME INSECURITY: HOW HARD IS IT FOR YOU TO PAY FOR THE VERY BASICS LIKE FOOD, HOUSING, MEDICAL CARE, AND HEATING?: SOMEWHAT HARD

## 2022-09-07 SDOH — ECONOMIC STABILITY: INCOME INSECURITY: IN THE LAST 12 MONTHS, WAS THERE A TIME WHEN YOU WERE NOT ABLE TO PAY THE MORTGAGE OR RENT ON TIME?: NO

## 2022-09-07 ASSESSMENT — SOCIAL DETERMINANTS OF HEALTH (SDOH)
IN A TYPICAL WEEK, HOW MANY TIMES DO YOU TALK ON THE PHONE WITH FAMILY, FRIENDS, OR NEIGHBORS?: MORE THAN THREE TIMES A WEEK
HOW OFTEN DO YOU ATTEND CHURCH OR RELIGIOUS SERVICES?: NEVER
DO YOU BELONG TO ANY CLUBS OR ORGANIZATIONS SUCH AS CHURCH GROUPS UNIONS, FRATERNAL OR ATHLETIC GROUPS, OR SCHOOL GROUPS?: YES
ARE YOU MARRIED, WIDOWED, DIVORCED, SEPARATED, NEVER MARRIED, OR LIVING WITH A PARTNER?: NEVER MARRIED
HOW OFTEN DO YOU GET TOGETHER WITH FRIENDS OR RELATIVES?: TWICE A WEEK

## 2022-09-07 ASSESSMENT — ENCOUNTER SYMPTOMS
HEARTBURN: 0
HEMATOCHEZIA: 0
MYALGIAS: 0
HEADACHES: 0
ARTHRALGIAS: 0
EYE PAIN: 0
PALPITATIONS: 0
DIZZINESS: 0
CONSTIPATION: 0
NAUSEA: 0
DYSURIA: 0
BREAST MASS: 0
ABDOMINAL PAIN: 0
PARESTHESIAS: 0
FEVER: 0
CHILLS: 0
JOINT SWELLING: 0
COUGH: 0
SHORTNESS OF BREATH: 0
WEAKNESS: 0
DIARRHEA: 0
NERVOUS/ANXIOUS: 0
FREQUENCY: 0
SORE THROAT: 0
HEMATURIA: 0

## 2022-09-07 ASSESSMENT — LIFESTYLE VARIABLES
AUDIT-C TOTAL SCORE: 2
HOW OFTEN DO YOU HAVE SIX OR MORE DRINKS ON ONE OCCASION: NEVER
HOW OFTEN DO YOU HAVE A DRINK CONTAINING ALCOHOL: 2-4 TIMES A MONTH
HOW MANY STANDARD DRINKS CONTAINING ALCOHOL DO YOU HAVE ON A TYPICAL DAY: 1 OR 2
SKIP TO QUESTIONS 9-10: 1

## 2022-09-07 ASSESSMENT — PATIENT HEALTH QUESTIONNAIRE - PHQ9
10. IF YOU CHECKED OFF ANY PROBLEMS, HOW DIFFICULT HAVE THESE PROBLEMS MADE IT FOR YOU TO DO YOUR WORK, TAKE CARE OF THINGS AT HOME, OR GET ALONG WITH OTHER PEOPLE: SOMEWHAT DIFFICULT
SUM OF ALL RESPONSES TO PHQ QUESTIONS 1-9: 6
SUM OF ALL RESPONSES TO PHQ QUESTIONS 1-9: 6

## 2022-09-07 NOTE — PROGRESS NOTES
SUBJECTIVE:   CC: Gayathri Gomez is an 32 year old woman who presents for preventive health visit.       Patient has been advised of split billing requirements and indicates understanding: Yes  Healthy Habits:     Getting at least 3 servings of Calcium per day:  NO    Bi-annual eye exam:  NO    Dental care twice a year:  Yes    Sleep apnea or symptoms of sleep apnea:  None    Diet:  Regular (no restrictions)    Frequency of exercise:  2-3 days/week    Duration of exercise:  30-45 minutes    Taking medications regularly:  Yes    Medication side effects:  None    PHQ-2 Total Score: 1    Additional concerns today:  No    ADHD: Taking  concerta 36 mg   Working well at this dosage.  Denies tics, palpitations, insomnia or trouble with appetite    Anxiety/depression: well controlled on paxil       Today's PHQ-2 Score:   PHQ-2 ( 1999 Pf[employer]) 9/7/2022   Q1: Little interest or pleasure in doing things 0   Q2: Feeling down, depressed or hopeless 1   PHQ-2 Score 1   PHQ-2 Total Score (12-17 Years)- Positive if 3 or more points; Administer PHQ-A if positive -   Q1: Little interest or pleasure in doing things Not at all   Q2: Feeling down, depressed or hopeless Several days   PHQ-2 Score 1       Abuse: Current or Past (Physical, Sexual or Emotional) - No  Do you feel safe in your environment? Yes        Social History     Tobacco Use     Smoking status: Current Some Day Smoker     Years: 5.00     Types: Cigarettes     Smokeless tobacco: Never Used   Substance Use Topics     Alcohol use: Yes     Alcohol/week: 0.0 standard drinks     Comment: occ         Alcohol Use 9/7/2022   Prescreen: >3 drinks/day or >7 drinks/week? No   Prescreen: >3 drinks/day or >7 drinks/week? -   AUDIT SCORE  -       Reviewed orders with patient.  Reviewed health maintenance and updated orders accordingly - Yes  BP Readings from Last 3 Encounters:   09/07/22 116/79   04/06/22 116/83   08/04/21 123/82    Wt Readings from Last 3 Encounters:   09/07/22  97.1 kg (214 lb)   04/06/22 86.2 kg (190 lb)   08/04/21 83.9 kg (185 lb)                  Recent Labs   Lab Test 04/06/22  1559 01/27/21  0905 11/18/19  0824 11/17/19  1741 04/29/19  0846 04/08/19  0905 06/26/17  0916   0000   LDL  --  142*  --   --   --  149* 152*  --    HDL  --  61  --   --   --  68 68  --    TRIG  --  55  --   --   --  55 131  --    ALT  --   --   --   --   --  22  --   --    CR  --   --  0.69 0.87  --  0.72  --    < >   GFRESTIMATED  --   --  >90 89  --  >90  --    < >   GFRESTBLACK  --   --  >90 >90  --  >90  --    < >   POTASSIUM  --   --  3.6 3.5  --  3.1*  --    < >   TSH 2.74 2.97  --   --    < >  --   --   --     < > = values in this interval not displayed.        Breast Cancer Screening:    FHS-7:   Breast CA Risk Assessment (FHS-7) 9/7/2022   Did any of your first-degree relatives have breast or ovarian cancer? Yes   Did any of your relatives have bilateral breast cancer? No   Did any man in your family have breast cancer? No   Did any woman in your family have breast and ovarian cancer? No   Did any woman in your family have breast cancer before age 50 y? No   Do you have 2 or more relatives with breast and/or ovarian cancer? No   Do you have 2 or more relatives with breast and/or bowel cancer? No     click delete button to remove this line now  Patient under 40 years of age: Routine Mammogram Screening not recommended.   Pertinent mammograms are reviewed under the imaging tab.    History of abnormal Pap smear: NO - age 30- 65 PAP every 3 years recommended  PAP / HPV 4/8/2019 5/19/2016 2/5/2013   PAP (Historical) NIL NIL NIL     Reviewed and updated as needed this visit by clinical staff   Tobacco  Allergies    Med Hx  Surg Hx  Fam Hx  Soc Hx          Reviewed and updated as needed this visit by Provider                       Review of Systems   Constitutional: Negative for chills and fever.   HENT: Negative for congestion, ear pain, hearing loss and sore throat.    Eyes: Negative  "for pain and visual disturbance.   Respiratory: Negative for cough and shortness of breath.    Cardiovascular: Negative for chest pain, palpitations and peripheral edema.   Gastrointestinal: Negative for abdominal pain, constipation, diarrhea, heartburn, hematochezia and nausea.   Breasts:  Negative for tenderness, breast mass and discharge.   Genitourinary: Negative for dysuria, frequency, genital sores, hematuria, pelvic pain, urgency, vaginal bleeding and vaginal discharge.   Musculoskeletal: Negative for arthralgias, joint swelling and myalgias.   Skin: Negative for rash.   Neurological: Negative for dizziness, weakness, headaches and paresthesias.   Psychiatric/Behavioral: Negative for mood changes. The patient is not nervous/anxious.           OBJECTIVE:   /79 (BP Location: Right arm, Patient Position: Sitting, Cuff Size: Adult Large)   Pulse 62   Temp 98.3  F (36.8  C) (Oral)   Resp 14   Ht 1.626 m (5' 4\")   Wt 97.1 kg (214 lb)   LMP 08/07/2022 (Approximate)   Breastfeeding No   BMI 36.73 kg/m    Physical Exam  GENERAL: healthy, alert and no distress  EYES: Eyes grossly normal to inspection, PERRL and conjunctivae and sclerae normal  HENT: ear canals and TM's normal, nose and mouth without ulcers or lesions  NECK: no adenopathy, no asymmetry, masses, or scars and thyroid normal to palpation  RESP: lungs clear to auscultation - no rales, rhonchi or wheezes  BREAST: normal without masses, tenderness or nipple discharge and no palpable axillary masses or adenopathy  CV: regular rate and rhythm, normal S1 S2, no S3 or S4, no murmur, click or rub, no peripheral edema and peripheral pulses strong  ABDOMEN: soft, nontender, no hepatosplenomegaly, no masses and bowel sounds normal   (female): normal female external genitalia, normal urethral meatus, vaginal mucosa pink, moist, well rugated, and normal cervix/adnexa/uterus without masses or discharge  MS: no gross musculoskeletal defects noted, no " edema  SKIN: no suspicious lesions or rashes  NEURO: Normal strength and tone, mentation intact and speech normal  PSYCH: mentation appears normal, affect normal/bright  LYMPH: no cervical, supraclavicular, axillary, or inguinal adenopathy    Diagnostic Test Results:  Labs reviewed in Epic    ASSESSMENT/PLAN:   (F90.0) Attention deficit hyperactivity disorder (ADHD), predominantly inattentive type  (primary encounter diagnosis)  Comment:   Plan: methylphenidate (CONCERTA) 36 MG CR tablet,         methylphenidate (CONCERTA) 36 MG CR tablet,         methylphenidate (CONCERTA) 36 MG CR tablet        Patient given postdated prescriptions for 3 months time. Follow-up in 6 months for medication recheck.  Continue non-medication behavioral modifications to improve attention and organization including having a regular schedule, breaking projects into smaller manageable time periods, work in quiet distraction free environment, achieve regular sleep pattern and healthy diet, remain physically active, as well as using lists, reminders, and calendars.      (Z12.4) Cervical cancer screening  Comment:   Plan: Pap Screen with HPV - recommended age 30 - 65         years            (Z00.00) Routine general medical examination at a health care facility  Comment:   Plan:     (Z30.09) Counseling for birth control, oral contraceptives  Comment:   Plan: JUNEL 1/20 1-20 MG-MCG tablet            (F41.9) Anxiety  Comment:   Plan: PARoxetine (PAXIL) 40 MG tablet            (F33.41) Recurrent major depressive disorder, in partial remission (H)  Comment:   Plan: PARoxetine (PAXIL) 40 MG tablet            (F40.10) Social phobia  Comment:   Plan: PARoxetine (PAXIL) 40 MG tablet                  COUNSELING:  Reviewed preventive health counseling, as reflected in patient instructions       Regular exercise       Healthy diet/nutrition       Vision screening       Hearing screening    Estimated body mass index is 36.73 kg/m  as calculated from the  "following:    Height as of this encounter: 1.626 m (5' 4\").    Weight as of this encounter: 97.1 kg (214 lb).        She reports that she has been smoking cigarettes. She has smoked for the past 5.00 years. She has never used smokeless tobacco.  Nicotine/Tobacco Cessation Plan:   Information offered: Patient not interested at this time      Counseling Resources:  ATP IV Guidelines  Pooled Cohorts Equation Calculator  Breast Cancer Risk Calculator  BRCA-Related Cancer Risk Assessment: FHS-7 Tool  FRAX Risk Assessment  ICSI Preventive Guidelines  Dietary Guidelines for Americans, 2010  [facility]'s MyPlate  ASA Prophylaxis  Lung CA Screening    Ramona Ann Aaseby-Aguilera, PA-C  [facility]  Answers for HPI/ROS submitted by the patient on 9/7/2022  If you checked off any problems, how difficult have these problems made it for you to do your work, take care of things at home, or get along with other people?: Somewhat difficult  PHQ9 TOTAL SCORE: 6      "

## 2022-09-12 LAB
BKR LAB AP GYN ADEQUACY: NORMAL
BKR LAB AP GYN INTERPRETATION: NORMAL
BKR LAB AP HPV REFLEX: NORMAL
BKR LAB AP PREVIOUS ABNORMAL: NORMAL
PATH REPORT.COMMENTS IMP SPEC: NORMAL
PATH REPORT.COMMENTS IMP SPEC: NORMAL
PATH REPORT.RELEVANT HX SPEC: NORMAL

## 2022-09-13 LAB
HUMAN PAPILLOMA VIRUS 16 DNA: NEGATIVE
HUMAN PAPILLOMA VIRUS 18 DNA: NEGATIVE
HUMAN PAPILLOMA VIRUS FINAL DIAGNOSIS: NORMAL
HUMAN PAPILLOMA VIRUS OTHER HR: NEGATIVE

## 2022-09-30 DIAGNOSIS — Z30.09 COUNSELING FOR BIRTH CONTROL, ORAL CONTRACEPTIVES: ICD-10-CM

## 2022-09-30 RX ORDER — NORETHINDRONE ACETATE AND ETHINYL ESTRADIOL 1; 20 MG/1; UG/1
TABLET ORAL
Qty: 84 TABLET | Refills: 3 | OUTPATIENT
Start: 2022-09-30

## 2022-12-28 ENCOUNTER — MYC REFILL (OUTPATIENT)
Dept: FAMILY MEDICINE | Facility: CLINIC | Age: 32
End: 2022-12-28

## 2022-12-28 DIAGNOSIS — F90.9 ATTENTION DEFICIT HYPERACTIVITY DISORDER (ADHD), UNSPECIFIED ADHD TYPE: ICD-10-CM

## 2022-12-29 RX ORDER — METHYLPHENIDATE HYDROCHLORIDE 36 MG/1
36 TABLET ORAL DAILY
Qty: 30 TABLET | Refills: 0 | Status: SHIPPED | OUTPATIENT
Start: 2022-12-29 | End: 2023-06-07

## 2022-12-29 NOTE — TELEPHONE ENCOUNTER
Routing refill request to provider for review/approval because:  Drug not on the [facility] refill protocol     Katy Armando RN

## 2023-02-01 ENCOUNTER — OFFICE VISIT (OUTPATIENT)
Dept: FAMILY MEDICINE | Facility: CLINIC | Age: 33
End: 2023-02-01
Payer: COMMERCIAL

## 2023-02-01 VITALS
SYSTOLIC BLOOD PRESSURE: 120 MMHG | OXYGEN SATURATION: 97 % | HEART RATE: 87 BPM | TEMPERATURE: 98 F | BODY MASS INDEX: 36.54 KG/M2 | HEIGHT: 64 IN | DIASTOLIC BLOOD PRESSURE: 80 MMHG | RESPIRATION RATE: 14 BRPM | WEIGHT: 214 LBS

## 2023-02-01 DIAGNOSIS — F33.41 RECURRENT MAJOR DEPRESSIVE DISORDER, IN PARTIAL REMISSION (H): ICD-10-CM

## 2023-02-01 DIAGNOSIS — F40.10 SOCIAL PHOBIA: ICD-10-CM

## 2023-02-01 DIAGNOSIS — Z11.3 SCREEN FOR STD (SEXUALLY TRANSMITTED DISEASE): ICD-10-CM

## 2023-02-01 DIAGNOSIS — E03.8 OTHER SPECIFIED HYPOTHYROIDISM: Primary | ICD-10-CM

## 2023-02-01 DIAGNOSIS — F90.0 ATTENTION DEFICIT HYPERACTIVITY DISORDER (ADHD), PREDOMINANTLY INATTENTIVE TYPE: ICD-10-CM

## 2023-02-01 PROCEDURE — 87491 CHLMYD TRACH DNA AMP PROBE: CPT | Performed by: PHYSICIAN ASSISTANT

## 2023-02-01 PROCEDURE — 87591 N.GONORRHOEAE DNA AMP PROB: CPT | Performed by: PHYSICIAN ASSISTANT

## 2023-02-01 PROCEDURE — 99214 OFFICE O/P EST MOD 30 MIN: CPT | Performed by: PHYSICIAN ASSISTANT

## 2023-02-01 RX ORDER — METHYLPHENIDATE HYDROCHLORIDE 36 MG/1
36 TABLET ORAL DAILY
Qty: 30 TABLET | Refills: 0 | Status: SHIPPED | OUTPATIENT
Start: 2023-04-04 | End: 2023-05-07

## 2023-02-01 RX ORDER — METHYLPHENIDATE HYDROCHLORIDE 36 MG/1
36 TABLET ORAL DAILY
Qty: 30 TABLET | Refills: 0 | Status: SHIPPED | OUTPATIENT
Start: 2023-02-01 | End: 2023-03-03

## 2023-02-01 RX ORDER — PROPRANOLOL HCL 60 MG
60 CAPSULE, EXTENDED RELEASE 24HR ORAL DAILY
Qty: 90 CAPSULE | Refills: 3 | Status: SHIPPED | OUTPATIENT
Start: 2023-02-01 | End: 2023-11-01

## 2023-02-01 RX ORDER — METHYLPHENIDATE HYDROCHLORIDE 36 MG/1
36 TABLET ORAL DAILY
Qty: 30 TABLET | Refills: 0 | Status: SHIPPED | OUTPATIENT
Start: 2023-03-04 | End: 2023-04-03

## 2023-02-01 ASSESSMENT — PATIENT HEALTH QUESTIONNAIRE - PHQ9
SUM OF ALL RESPONSES TO PHQ QUESTIONS 1-9: 7
SUM OF ALL RESPONSES TO PHQ QUESTIONS 1-9: 7
10. IF YOU CHECKED OFF ANY PROBLEMS, HOW DIFFICULT HAVE THESE PROBLEMS MADE IT FOR YOU TO DO YOUR WORK, TAKE CARE OF THINGS AT HOME, OR GET ALONG WITH OTHER PEOPLE: VERY DIFFICULT

## 2023-02-01 NOTE — PROGRESS NOTES
"  Assessment & Plan     Social phobia  Pt requests helps.   - propranolol ER (INDERAL LA) 60 MG 24 hr capsule; Take 1 capsule (60 mg) by mouth daily    Other specified hypothyroidism  Well recheck   - TSH with free T4 reflex; Future    Attention deficit hyperactivity disorder (ADHD), predominantly inattentive type  Stable   - methylphenidate (CONCERTA) 36 MG CR tablet; Take 1 tablet (36 mg) by mouth daily for 30 days  - methylphenidate (CONCERTA) 36 MG CR tablet; Take 1 tablet (36 mg) by mouth daily for 30 days  - methylphenidate (CONCERTA) 36 MG CR tablet; Take 1 tablet (36 mg) by mouth daily for 30 days    Screen for STD (sexually transmitted disease)    - NEISSERIA GONORRHOEA PCR  - CHLAMYDIA TRACHOMATIS PCR      (F33.41) Recurrent major depressive disorder, in partial remission (H)  Comment:   Plan: depression stable.  Just lost her pet rabbit but doing ok. Recently got a day time job and stopped her overnights which has really helped with her moods.       [id number]}     BMI:   Estimated body mass index is 36.73 kg/m  as calculated from the following:    Height as of this encounter: 1.626 m (5' 4\").    Weight as of this encounter: 97.1 kg (214 lb).       Depression Screening Follow Up    PHQ 2/1/2023   PHQ-9 Total Score 7   Q9: Thoughts of better off dead/self-harm past 2 weeks Several days   F/U: Thoughts of suicide or self-harm Yes   F/U: Self harm-plan Yes   F/U: Self-harm action No   F/U: Safety concerns No                 Follow Up    Follow Up Actions Taken      Discussed the following ways the patient can remain in a safe environment:        Return in about 2 months (around 4/1/2023) for thyroid.    Ramona Ann Aaseby-Aguilera, PA-C [facility] SERGIO Trinh is a 32 year old, presenting for the following health issues:  Recheck Medication      History of Present Illness       Reason for visit:  Meds review    She eats 0-1 servings of fruits and vegetables daily.She consumes 3 " "sweetened beverage(s) daily.She exercises with enough effort to increase her heart rate 30 to 60 minutes per day.  She exercises with enough effort to increase her heart rate 4 days per week.   She is taking medications regularly.    Today's PHQ-9         PHQ-9 Total Score: 7    PHQ-9 Q9 Thoughts of better off dead/self-harm past 2 weeks :   Several days  Thoughts of suicide or self harm: (P) Yes  Self-harm Plan:   (P) Yes  Self-harm Action:     (P) No  Safety concerns for self or others: (P) No    How difficult have these problems made it for you to do your work, take care of things at home, or get along with other people: Very difficult             Review of Systems   Constitutional, HEENT, cardiovascular, pulmonary, gi and gu systems are negative, except as otherwise noted.      Objective    /80 (BP Location: Right arm, Patient Position: Sitting, Cuff Size: Adult Large)   Pulse 87   Temp 98  F (36.7  C) (Oral)   Resp 14   Ht 1.626 m (5' 4\")   Wt 97.1 kg (214 lb)   SpO2 97%   Breastfeeding No   BMI 36.73 kg/m    Body mass index is 36.73 kg/m .  Physical Exam   GENERAL: healthy, alert and no distress  HENT: ear canals and TM's normal, nose and mouth without ulcers or lesions  RESP: lungs clear to auscultation - no rales, rhonchi or wheezes  CV: regular rate and rhythm, normal S1 S2, no S3 or S4, no murmur, click or rub, no peripheral edema and peripheral pulses strong  MS: no gross musculoskeletal defects noted, no edema  PSYCH: mentation appears normal, affect normal/bright                    "

## 2023-02-03 LAB
C TRACH DNA SPEC QL NAA+PROBE: NEGATIVE
N GONORRHOEA DNA SPEC QL NAA+PROBE: NEGATIVE

## 2023-04-19 ENCOUNTER — LAB (OUTPATIENT)
Dept: LAB | Facility: CLINIC | Age: 33
End: 2023-04-19
Payer: COMMERCIAL

## 2023-04-19 DIAGNOSIS — E03.8 OTHER SPECIFIED HYPOTHYROIDISM: ICD-10-CM

## 2023-04-19 LAB — TSH SERPL DL<=0.005 MIU/L-ACNC: 1.43 UIU/ML (ref 0.3–4.2)

## 2023-04-19 PROCEDURE — 84443 ASSAY THYROID STIM HORMONE: CPT

## 2023-04-19 PROCEDURE — 36415 COLL VENOUS BLD VENIPUNCTURE: CPT

## 2023-05-04 DIAGNOSIS — E03.9 HYPOTHYROIDISM, UNSPECIFIED TYPE: ICD-10-CM

## 2023-05-04 RX ORDER — LEVOTHYROXINE SODIUM 50 UG/1
TABLET ORAL
Qty: 30 TABLET | Refills: 8 | Status: SHIPPED | OUTPATIENT
Start: 2023-05-04 | End: 2023-11-01

## 2023-05-07 ENCOUNTER — MYC REFILL (OUTPATIENT)
Dept: FAMILY MEDICINE | Facility: CLINIC | Age: 33
End: 2023-05-07
Payer: COMMERCIAL

## 2023-05-07 DIAGNOSIS — F90.0 ATTENTION DEFICIT HYPERACTIVITY DISORDER (ADHD), PREDOMINANTLY INATTENTIVE TYPE: ICD-10-CM

## 2023-05-09 RX ORDER — METHYLPHENIDATE HYDROCHLORIDE 36 MG/1
36 TABLET ORAL DAILY
Qty: 30 TABLET | Refills: 0 | Status: SHIPPED | OUTPATIENT
Start: 2023-05-09 | End: 2023-09-06

## 2023-06-07 ENCOUNTER — MYC REFILL (OUTPATIENT)
Dept: FAMILY MEDICINE | Facility: CLINIC | Age: 33
End: 2023-06-07
Payer: COMMERCIAL

## 2023-06-07 DIAGNOSIS — F90.9 ATTENTION DEFICIT HYPERACTIVITY DISORDER (ADHD), UNSPECIFIED ADHD TYPE: ICD-10-CM

## 2023-06-07 NOTE — TELEPHONE ENCOUNTER
Routing refill request to provider for review/approval because:  Drug not on the [facility] refill protocol     Mirna CHAKRABORTY RN

## 2023-06-09 RX ORDER — METHYLPHENIDATE HYDROCHLORIDE 36 MG/1
36 TABLET ORAL DAILY
Qty: 30 TABLET | Refills: 0 | Status: SHIPPED | OUTPATIENT
Start: 2023-06-09 | End: 2023-07-07

## 2023-07-07 ENCOUNTER — MYC REFILL (OUTPATIENT)
Dept: FAMILY MEDICINE | Facility: CLINIC | Age: 33
End: 2023-07-07
Payer: COMMERCIAL

## 2023-07-07 DIAGNOSIS — F90.9 ATTENTION DEFICIT HYPERACTIVITY DISORDER (ADHD), UNSPECIFIED ADHD TYPE: ICD-10-CM

## 2023-07-11 RX ORDER — METHYLPHENIDATE HYDROCHLORIDE 36 MG/1
36 TABLET ORAL DAILY
Qty: 30 TABLET | Refills: 0 | Status: SHIPPED | OUTPATIENT
Start: 2023-07-11 | End: 2023-08-07

## 2023-08-07 ENCOUNTER — MYC REFILL (OUTPATIENT)
Dept: FAMILY MEDICINE | Facility: CLINIC | Age: 33
End: 2023-08-07
Payer: COMMERCIAL

## 2023-08-07 DIAGNOSIS — F90.9 ATTENTION DEFICIT HYPERACTIVITY DISORDER (ADHD), UNSPECIFIED ADHD TYPE: ICD-10-CM

## 2023-08-08 ENCOUNTER — PATIENT OUTREACH (OUTPATIENT)
Dept: CARE COORDINATION | Facility: CLINIC | Age: 33
End: 2023-08-08
Payer: COMMERCIAL

## 2023-08-08 RX ORDER — METHYLPHENIDATE HYDROCHLORIDE 36 MG/1
36 TABLET ORAL DAILY
Qty: 30 TABLET | Refills: 0 | Status: SHIPPED | OUTPATIENT
Start: 2023-08-08 | End: 2024-08-23

## 2023-08-22 ENCOUNTER — PATIENT OUTREACH (OUTPATIENT)
Dept: CARE COORDINATION | Facility: CLINIC | Age: 33
End: 2023-08-22
Payer: COMMERCIAL

## 2023-09-06 ENCOUNTER — MYC REFILL (OUTPATIENT)
Dept: FAMILY MEDICINE | Facility: CLINIC | Age: 33
End: 2023-09-06
Payer: COMMERCIAL

## 2023-09-06 DIAGNOSIS — F90.0 ATTENTION DEFICIT HYPERACTIVITY DISORDER (ADHD), PREDOMINANTLY INATTENTIVE TYPE: ICD-10-CM

## 2023-09-06 RX ORDER — METHYLPHENIDATE HYDROCHLORIDE 36 MG/1
36 TABLET ORAL DAILY
Qty: 30 TABLET | Refills: 0 | Status: SHIPPED | OUTPATIENT
Start: 2023-09-06 | End: 2023-10-09

## 2023-09-25 DIAGNOSIS — Z30.09 COUNSELING FOR BIRTH CONTROL, ORAL CONTRACEPTIVES: ICD-10-CM

## 2023-09-25 RX ORDER — NORETHINDRONE ACETATE AND ETHINYL ESTRADIOL 1; 20 MG/1; UG/1
1 TABLET ORAL DAILY
Qty: 84 TABLET | Refills: 0 | Status: SHIPPED | OUTPATIENT
Start: 2023-09-25 | End: 2023-11-01

## 2023-10-03 DIAGNOSIS — F33.41 RECURRENT MAJOR DEPRESSIVE DISORDER, IN PARTIAL REMISSION (H): ICD-10-CM

## 2023-10-03 DIAGNOSIS — F40.10 SOCIAL PHOBIA: ICD-10-CM

## 2023-10-03 DIAGNOSIS — F41.9 ANXIETY: ICD-10-CM

## 2023-10-03 RX ORDER — PAROXETINE 40 MG/1
40 TABLET, FILM COATED ORAL EVERY MORNING
Qty: 30 TABLET | Refills: 11 | Status: SHIPPED | OUTPATIENT
Start: 2023-10-03 | End: 2023-11-01

## 2023-10-09 ENCOUNTER — MYC REFILL (OUTPATIENT)
Dept: FAMILY MEDICINE | Facility: CLINIC | Age: 33
End: 2023-10-09
Payer: COMMERCIAL

## 2023-10-09 DIAGNOSIS — F90.0 ATTENTION DEFICIT HYPERACTIVITY DISORDER (ADHD), PREDOMINANTLY INATTENTIVE TYPE: ICD-10-CM

## 2023-10-09 RX ORDER — METHYLPHENIDATE HYDROCHLORIDE 36 MG/1
36 TABLET ORAL DAILY
Qty: 30 TABLET | Refills: 0 | Status: SHIPPED | OUTPATIENT
Start: 2023-10-09 | End: 2023-11-01

## 2023-11-01 ENCOUNTER — OFFICE VISIT (OUTPATIENT)
Dept: FAMILY MEDICINE | Facility: CLINIC | Age: 33
End: 2023-11-01
Payer: COMMERCIAL

## 2023-11-01 VITALS
TEMPERATURE: 98.8 F | RESPIRATION RATE: 16 BRPM | DIASTOLIC BLOOD PRESSURE: 74 MMHG | OXYGEN SATURATION: 98 % | SYSTOLIC BLOOD PRESSURE: 104 MMHG | HEART RATE: 78 BPM

## 2023-11-01 DIAGNOSIS — F40.10 SOCIAL PHOBIA: ICD-10-CM

## 2023-11-01 DIAGNOSIS — E03.9 HYPOTHYROIDISM, UNSPECIFIED TYPE: ICD-10-CM

## 2023-11-01 DIAGNOSIS — F33.41 RECURRENT MAJOR DEPRESSIVE DISORDER, IN PARTIAL REMISSION (H): ICD-10-CM

## 2023-11-01 DIAGNOSIS — F41.9 ANXIETY: ICD-10-CM

## 2023-11-01 DIAGNOSIS — F90.0 ATTENTION DEFICIT HYPERACTIVITY DISORDER (ADHD), PREDOMINANTLY INATTENTIVE TYPE: Primary | ICD-10-CM

## 2023-11-01 DIAGNOSIS — Z30.09 COUNSELING FOR BIRTH CONTROL, ORAL CONTRACEPTIVES: ICD-10-CM

## 2023-11-01 PROCEDURE — 99214 OFFICE O/P EST MOD 30 MIN: CPT | Mod: 25 | Performed by: PHYSICIAN ASSISTANT

## 2023-11-01 PROCEDURE — 99395 PREV VISIT EST AGE 18-39: CPT | Performed by: PHYSICIAN ASSISTANT

## 2023-11-01 RX ORDER — METHYLPHENIDATE HYDROCHLORIDE 36 MG/1
36 TABLET ORAL DAILY
Qty: 30 TABLET | Refills: 0 | Status: SHIPPED | OUTPATIENT
Start: 2023-12-02 | End: 2024-01-15

## 2023-11-01 RX ORDER — PROPRANOLOL HCL 60 MG
60 CAPSULE, EXTENDED RELEASE 24HR ORAL DAILY
Qty: 90 CAPSULE | Refills: 3 | Status: SHIPPED | OUTPATIENT
Start: 2023-11-01

## 2023-11-01 RX ORDER — METHYLPHENIDATE HYDROCHLORIDE 36 MG/1
36 TABLET ORAL DAILY
Qty: 30 TABLET | Refills: 0 | Status: SHIPPED | OUTPATIENT
Start: 2024-01-02 | End: 2024-02-01

## 2023-11-01 RX ORDER — LEVOTHYROXINE SODIUM 50 UG/1
50 TABLET ORAL DAILY
Qty: 90 TABLET | Refills: 1 | Status: SHIPPED | OUTPATIENT
Start: 2023-11-01 | End: 2024-05-16

## 2023-11-01 RX ORDER — NORETHINDRONE ACETATE AND ETHINYL ESTRADIOL 1; 20 MG/1; UG/1
1 TABLET ORAL DAILY
Qty: 84 TABLET | Refills: 3 | Status: SHIPPED | OUTPATIENT
Start: 2023-11-01

## 2023-11-01 RX ORDER — PAROXETINE 40 MG/1
40 TABLET, FILM COATED ORAL EVERY MORNING
Qty: 90 TABLET | Refills: 3 | Status: SHIPPED | OUTPATIENT
Start: 2023-11-01

## 2023-11-01 RX ORDER — METHYLPHENIDATE HYDROCHLORIDE 36 MG/1
36 TABLET ORAL DAILY
Qty: 30 TABLET | Refills: 0 | Status: SHIPPED | OUTPATIENT
Start: 2023-11-01 | End: 2023-11-11

## 2023-11-01 SDOH — HEALTH STABILITY: PHYSICAL HEALTH: ON AVERAGE, HOW MANY DAYS PER WEEK DO YOU ENGAGE IN MODERATE TO STRENUOUS EXERCISE (LIKE A BRISK WALK)?: 3 DAYS

## 2023-11-01 ASSESSMENT — LIFESTYLE VARIABLES
AUDIT-C TOTAL SCORE: 3
HOW OFTEN DO YOU HAVE A DRINK CONTAINING ALCOHOL: 2-3 TIMES A WEEK
HOW MANY STANDARD DRINKS CONTAINING ALCOHOL DO YOU HAVE ON A TYPICAL DAY: 1 OR 2
HOW OFTEN DO YOU HAVE SIX OR MORE DRINKS ON ONE OCCASION: NEVER
SKIP TO QUESTIONS 9-10: 1

## 2023-11-01 ASSESSMENT — ENCOUNTER SYMPTOMS
BREAST MASS: 0
MYALGIAS: 1

## 2023-11-01 ASSESSMENT — SOCIAL DETERMINANTS OF HEALTH (SDOH)
ARE YOU MARRIED, WIDOWED, DIVORCED, SEPARATED, NEVER MARRIED, OR LIVING WITH A PARTNER?: NEVER MARRIED
IN A TYPICAL WEEK, HOW MANY TIMES DO YOU TALK ON THE PHONE WITH FAMILY, FRIENDS, OR NEIGHBORS?: MORE THAN THREE TIMES A WEEK
HOW OFTEN DO YOU ATTENT MEETINGS OF THE CLUB OR ORGANIZATION YOU BELONG TO?: NEVER
DO YOU BELONG TO ANY CLUBS OR ORGANIZATIONS SUCH AS CHURCH GROUPS UNIONS, FRATERNAL OR ATHLETIC GROUPS, OR SCHOOL GROUPS?: NO
HOW OFTEN DO YOU ATTEND CHURCH OR RELIGIOUS SERVICES?: NEVER
HOW OFTEN DO YOU GET TOGETHER WITH FRIENDS OR RELATIVES?: TWICE A WEEK

## 2023-11-01 ASSESSMENT — PATIENT HEALTH QUESTIONNAIRE - PHQ9
SUM OF ALL RESPONSES TO PHQ QUESTIONS 1-9: 6
SUM OF ALL RESPONSES TO PHQ QUESTIONS 1-9: 6
10. IF YOU CHECKED OFF ANY PROBLEMS, HOW DIFFICULT HAVE THESE PROBLEMS MADE IT FOR YOU TO DO YOUR WORK, TAKE CARE OF THINGS AT HOME, OR GET ALONG WITH OTHER PEOPLE: SOMEWHAT DIFFICULT

## 2023-11-01 ASSESSMENT — PAIN SCALES - GENERAL: PAINLEVEL: SEVERE PAIN (6)

## 2023-11-11 ENCOUNTER — MYC REFILL (OUTPATIENT)
Dept: FAMILY MEDICINE | Facility: CLINIC | Age: 33
End: 2023-11-11
Payer: COMMERCIAL

## 2023-11-11 DIAGNOSIS — F90.0 ATTENTION DEFICIT HYPERACTIVITY DISORDER (ADHD), PREDOMINANTLY INATTENTIVE TYPE: ICD-10-CM

## 2023-11-13 RX ORDER — METHYLPHENIDATE HYDROCHLORIDE 36 MG/1
36 TABLET ORAL DAILY
Qty: 30 TABLET | Refills: 0 | Status: SHIPPED | OUTPATIENT
Start: 2023-11-13

## 2024-01-15 ENCOUNTER — MYC REFILL (OUTPATIENT)
Dept: FAMILY MEDICINE | Facility: CLINIC | Age: 34
End: 2024-01-15
Payer: COMMERCIAL

## 2024-01-15 DIAGNOSIS — F90.0 ATTENTION DEFICIT HYPERACTIVITY DISORDER (ADHD), PREDOMINANTLY INATTENTIVE TYPE: ICD-10-CM

## 2024-01-15 RX ORDER — METHYLPHENIDATE HYDROCHLORIDE 36 MG/1
36 TABLET ORAL DAILY
Qty: 30 TABLET | Refills: 0 | Status: SHIPPED | OUTPATIENT
Start: 2024-01-15 | End: 2024-02-16

## 2024-02-11 DIAGNOSIS — K21.9 GASTROESOPHAGEAL REFLUX DISEASE WITHOUT ESOPHAGITIS: ICD-10-CM

## 2024-02-16 ENCOUNTER — MYC REFILL (OUTPATIENT)
Dept: FAMILY MEDICINE | Facility: CLINIC | Age: 34
End: 2024-02-16
Payer: COMMERCIAL

## 2024-02-16 DIAGNOSIS — F90.0 ATTENTION DEFICIT HYPERACTIVITY DISORDER (ADHD), PREDOMINANTLY INATTENTIVE TYPE: ICD-10-CM

## 2024-02-16 RX ORDER — METHYLPHENIDATE HYDROCHLORIDE 36 MG/1
36 TABLET ORAL DAILY
Qty: 30 TABLET | Refills: 0 | Status: SHIPPED | OUTPATIENT
Start: 2024-02-16 | End: 2024-03-18

## 2024-03-18 ENCOUNTER — MYC REFILL (OUTPATIENT)
Dept: FAMILY MEDICINE | Facility: CLINIC | Age: 34
End: 2024-03-18
Payer: COMMERCIAL

## 2024-03-18 DIAGNOSIS — F90.0 ATTENTION DEFICIT HYPERACTIVITY DISORDER (ADHD), PREDOMINANTLY INATTENTIVE TYPE: ICD-10-CM

## 2024-03-19 RX ORDER — METHYLPHENIDATE HYDROCHLORIDE 36 MG/1
36 TABLET ORAL DAILY
Qty: 30 TABLET | Refills: 0 | Status: SHIPPED | OUTPATIENT
Start: 2024-03-19 | End: 2024-04-19

## 2024-04-19 ENCOUNTER — MYC REFILL (OUTPATIENT)
Dept: FAMILY MEDICINE | Facility: CLINIC | Age: 34
End: 2024-04-19
Payer: COMMERCIAL

## 2024-04-19 DIAGNOSIS — F90.0 ATTENTION DEFICIT HYPERACTIVITY DISORDER (ADHD), PREDOMINANTLY INATTENTIVE TYPE: ICD-10-CM

## 2024-04-19 RX ORDER — METHYLPHENIDATE HYDROCHLORIDE 36 MG/1
36 TABLET ORAL DAILY
Qty: 30 TABLET | Refills: 0 | Status: SHIPPED | OUTPATIENT
Start: 2024-04-19 | End: 2024-05-16

## 2024-05-16 ENCOUNTER — MYC REFILL (OUTPATIENT)
Dept: FAMILY MEDICINE | Facility: CLINIC | Age: 34
End: 2024-05-16
Payer: COMMERCIAL

## 2024-05-16 DIAGNOSIS — E03.9 HYPOTHYROIDISM, UNSPECIFIED TYPE: ICD-10-CM

## 2024-05-16 DIAGNOSIS — F90.0 ATTENTION DEFICIT HYPERACTIVITY DISORDER (ADHD), PREDOMINANTLY INATTENTIVE TYPE: ICD-10-CM

## 2024-05-16 RX ORDER — LEVOTHYROXINE SODIUM 50 UG/1
50 TABLET ORAL DAILY
Qty: 30 TABLET | Refills: 0 | Status: SHIPPED | OUTPATIENT
Start: 2024-05-16 | End: 2024-06-12

## 2024-05-16 RX ORDER — METHYLPHENIDATE HYDROCHLORIDE 36 MG/1
36 TABLET ORAL DAILY
Qty: 30 TABLET | Refills: 0 | Status: SHIPPED | OUTPATIENT
Start: 2024-05-16 | End: 2024-06-12

## 2024-06-12 ENCOUNTER — MYC REFILL (OUTPATIENT)
Dept: FAMILY MEDICINE | Facility: CLINIC | Age: 34
End: 2024-06-12
Payer: COMMERCIAL

## 2024-06-12 DIAGNOSIS — F90.0 ATTENTION DEFICIT HYPERACTIVITY DISORDER (ADHD), PREDOMINANTLY INATTENTIVE TYPE: ICD-10-CM

## 2024-06-12 DIAGNOSIS — E03.9 HYPOTHYROIDISM, UNSPECIFIED TYPE: ICD-10-CM

## 2024-06-12 RX ORDER — METHYLPHENIDATE HYDROCHLORIDE 36 MG/1
36 TABLET ORAL DAILY
Qty: 30 TABLET | Refills: 0 | Status: SHIPPED | OUTPATIENT
Start: 2024-06-12 | End: 2024-07-29

## 2024-06-12 RX ORDER — LEVOTHYROXINE SODIUM 50 UG/1
50 TABLET ORAL DAILY
Qty: 30 TABLET | Refills: 0 | Status: SHIPPED | OUTPATIENT
Start: 2024-06-12 | End: 2024-08-23

## 2024-07-25 ENCOUNTER — MYC REFILL (OUTPATIENT)
Dept: FAMILY MEDICINE | Facility: CLINIC | Age: 34
End: 2024-07-25
Payer: COMMERCIAL

## 2024-07-25 DIAGNOSIS — F90.0 ATTENTION DEFICIT HYPERACTIVITY DISORDER (ADHD), PREDOMINANTLY INATTENTIVE TYPE: ICD-10-CM

## 2024-07-26 RX ORDER — METHYLPHENIDATE HYDROCHLORIDE 36 MG/1
36 TABLET ORAL DAILY
Qty: 30 TABLET | Refills: 0 | OUTPATIENT
Start: 2024-07-26

## 2024-07-27 ENCOUNTER — MYC MEDICAL ADVICE (OUTPATIENT)
Dept: FAMILY MEDICINE | Facility: CLINIC | Age: 34
End: 2024-07-27
Payer: COMMERCIAL

## 2024-07-27 DIAGNOSIS — F90.0 ATTENTION DEFICIT HYPERACTIVITY DISORDER (ADHD), PREDOMINANTLY INATTENTIVE TYPE: ICD-10-CM

## 2024-07-29 RX ORDER — METHYLPHENIDATE HYDROCHLORIDE 36 MG/1
36 TABLET ORAL DAILY
Qty: 30 TABLET | Refills: 0 | Status: SHIPPED | OUTPATIENT
Start: 2024-07-29 | End: 2024-08-23

## 2024-08-23 ENCOUNTER — VIRTUAL VISIT (OUTPATIENT)
Dept: FAMILY MEDICINE | Facility: CLINIC | Age: 34
End: 2024-08-23
Payer: COMMERCIAL

## 2024-08-23 DIAGNOSIS — Z13.29 SCREENING FOR THYROID DISORDER: ICD-10-CM

## 2024-08-23 DIAGNOSIS — Z13.1 SCREENING FOR DIABETES MELLITUS: Primary | ICD-10-CM

## 2024-08-23 DIAGNOSIS — F33.41 RECURRENT MAJOR DEPRESSIVE DISORDER, IN PARTIAL REMISSION (H): ICD-10-CM

## 2024-08-23 DIAGNOSIS — Z13.0 SCREENING FOR BLOOD DISEASE: ICD-10-CM

## 2024-08-23 DIAGNOSIS — F90.0 ATTENTION DEFICIT HYPERACTIVITY DISORDER (ADHD), PREDOMINANTLY INATTENTIVE TYPE: ICD-10-CM

## 2024-08-23 DIAGNOSIS — E03.9 HYPOTHYROIDISM, UNSPECIFIED TYPE: ICD-10-CM

## 2024-08-23 DIAGNOSIS — Z13.220 SCREENING, LIPID: ICD-10-CM

## 2024-08-23 PROCEDURE — G2211 COMPLEX E/M VISIT ADD ON: HCPCS | Mod: 95 | Performed by: PHYSICIAN ASSISTANT

## 2024-08-23 PROCEDURE — 99214 OFFICE O/P EST MOD 30 MIN: CPT | Mod: 95 | Performed by: PHYSICIAN ASSISTANT

## 2024-08-23 RX ORDER — METHYLPHENIDATE HYDROCHLORIDE 36 MG/1
36 TABLET ORAL DAILY
Qty: 30 TABLET | Refills: 0 | Status: SHIPPED | OUTPATIENT
Start: 2024-10-22 | End: 2024-11-21

## 2024-08-23 RX ORDER — METHYLPHENIDATE HYDROCHLORIDE 36 MG/1
36 TABLET ORAL DAILY
Qty: 30 TABLET | Refills: 0 | Status: SHIPPED | OUTPATIENT
Start: 2024-08-23 | End: 2024-09-22

## 2024-08-23 RX ORDER — METHYLPHENIDATE HYDROCHLORIDE 36 MG/1
36 TABLET ORAL DAILY
Qty: 30 TABLET | Refills: 0 | Status: CANCELLED | OUTPATIENT
Start: 2024-08-23

## 2024-08-23 RX ORDER — LEVOTHYROXINE SODIUM 50 UG/1
50 TABLET ORAL DAILY
Qty: 90 TABLET | Refills: 0 | Status: SHIPPED | OUTPATIENT
Start: 2024-08-23

## 2024-08-23 RX ORDER — METHYLPHENIDATE HYDROCHLORIDE 36 MG/1
36 TABLET ORAL DAILY
Qty: 30 TABLET | Refills: 0 | Status: SHIPPED | OUTPATIENT
Start: 2024-09-22 | End: 2024-10-22

## 2024-08-23 ASSESSMENT — ANXIETY QUESTIONNAIRES
7. FEELING AFRAID AS IF SOMETHING AWFUL MIGHT HAPPEN: MORE THAN HALF THE DAYS
4. TROUBLE RELAXING: SEVERAL DAYS
GAD7 TOTAL SCORE: 7
GAD7 TOTAL SCORE: 7
8. IF YOU CHECKED OFF ANY PROBLEMS, HOW DIFFICULT HAVE THESE MADE IT FOR YOU TO DO YOUR WORK, TAKE CARE OF THINGS AT HOME, OR GET ALONG WITH OTHER PEOPLE?: SOMEWHAT DIFFICULT
7. FEELING AFRAID AS IF SOMETHING AWFUL MIGHT HAPPEN: MORE THAN HALF THE DAYS
IF YOU CHECKED OFF ANY PROBLEMS ON THIS QUESTIONNAIRE, HOW DIFFICULT HAVE THESE PROBLEMS MADE IT FOR YOU TO DO YOUR WORK, TAKE CARE OF THINGS AT HOME, OR GET ALONG WITH OTHER PEOPLE: SOMEWHAT DIFFICULT
GAD7 TOTAL SCORE: 7
6. BECOMING EASILY ANNOYED OR IRRITABLE: SEVERAL DAYS
2. NOT BEING ABLE TO STOP OR CONTROL WORRYING: SEVERAL DAYS
1. FEELING NERVOUS, ANXIOUS, OR ON EDGE: SEVERAL DAYS
3. WORRYING TOO MUCH ABOUT DIFFERENT THINGS: SEVERAL DAYS
5. BEING SO RESTLESS THAT IT IS HARD TO SIT STILL: NOT AT ALL

## 2024-08-23 NOTE — PROGRESS NOTES
Gayathri is a 33 year old who is being evaluated via a billable video visit.    How would you like to obtain your AVS? MyChart  If the video visit is dropped, the invitation should be resent by: Text to cell phone: [phone]  Will anyone else be joining your video visit? No      Assessment & Plan     Attention deficit hyperactivity disorder (ADHD), predominantly inattentive type  Stable and well controlled.  Refilled x 3 months . - methylphenidate HCl ER, OSM, (CONCERTA) 36 MG CR tablet; Take 1 tablet (36 mg) by mouth daily.  - methylphenidate HCl ER, OSM, (CONCERTA) 36 MG CR tablet; Take 1 tablet (36 mg) by mouth daily. Do not start before September 22, 2024.  - methylphenidate HCl ER, OSM, (CONCERTA) 36 MG CR tablet; Take 1 tablet (36 mg) by mouth daily. Do not start before October 22, 2024.    Recurrent major depressive disorder, in partial remission (H24)  Stable     Hypothyroidism, unspecified type  Fill x 3 months as she is out of medication.  Will address at upcoming visit.   - levothyroxine (SYNTHROID/LEVOTHROID) 50 MCG tablet; Take 1 tablet (50 mcg) by mouth daily.  - TSH with free T4 reflex; Future    Screening for diabetes mellitus    - Comprehensive metabolic panel; Future    Screening, lipid    - Lipid Profile; Future    Screening for thyroid disorder      Screening for blood disease    - CBC with platelets; Future                Subjective   Gayathri is a 33 year old, presenting for the following health issues:  Refill Request and Recheck Medication        8/23/2024     8:45 AM   Additional Questions   Roomed by Maurice Patel     ADHD: Taking  concerta 36 mg   Working well at this dosage.  Denies tics, palpitations, insomnia or trouble with appetite      History of Present Illness       Hypothyroidism:     Since last visit, patient describes the following symptoms::  Anxiety, Depression and Fatigue    Reason for visit:  Adhd and thyriod meds    She eats 0-1 servings of fruits and vegetables daily.She consumes 0  sweetened beverage(s) daily.She exercises with enough effort to increase her heart rate 20 to 29 minutes per day.  She exercises with enough effort to increase her heart rate 4 days per week.   She is taking medications regularly.               Review of Systems  Constitutional, HEENT, cardiovascular, pulmonary, gi and gu systems are negative, except as otherwise noted.      Objective           Vitals:  No vitals were obtained today due to virtual visit.    Physical Exam   GENERAL: alert and no distress  EYES: Eyes grossly normal to inspection.  No discharge or erythema, or obvious scleral/conjunctival abnormalities.  RESP: No audible wheeze, cough, or visible cyanosis.    SKIN: Visible skin clear. No significant rash, abnormal pigmentation or lesions.  NEURO: Cranial nerves grossly intact.  Mentation and speech appropriate for age.  PSYCH: Appropriate affect, tone, and pace of words          Video-Visit Details    Type of service:  Video Visit   Originating Location (pt. Location): Home    Distant Location (provider location):  Off-site  Platform used for Video Visit: Brian  Signed Electronically by: Ramona Ann Aaseby-Aguilera, PA-C

## 2024-09-04 ENCOUNTER — LAB (OUTPATIENT)
Dept: LAB | Facility: CLINIC | Age: 34
End: 2024-09-04
Payer: COMMERCIAL

## 2024-09-04 DIAGNOSIS — Z13.220 SCREENING, LIPID: ICD-10-CM

## 2024-09-04 DIAGNOSIS — Z13.0 SCREENING FOR BLOOD DISEASE: ICD-10-CM

## 2024-09-04 DIAGNOSIS — E03.9 HYPOTHYROIDISM, UNSPECIFIED TYPE: ICD-10-CM

## 2024-09-04 DIAGNOSIS — Z13.1 SCREENING FOR DIABETES MELLITUS: ICD-10-CM

## 2024-09-04 LAB
ALBUMIN SERPL BCG-MCNC: 3.8 G/DL (ref 3.5–5.2)
ALP SERPL-CCNC: 79 U/L (ref 40–150)
ALT SERPL W P-5'-P-CCNC: 17 U/L (ref 0–50)
ANION GAP SERPL CALCULATED.3IONS-SCNC: 11 MMOL/L (ref 7–15)
AST SERPL W P-5'-P-CCNC: 27 U/L (ref 0–45)
BILIRUB SERPL-MCNC: 0.5 MG/DL
BUN SERPL-MCNC: 12.4 MG/DL (ref 6–20)
CALCIUM SERPL-MCNC: 8.8 MG/DL (ref 8.8–10.4)
CHLORIDE SERPL-SCNC: 105 MMOL/L (ref 98–107)
CHOLEST SERPL-MCNC: 210 MG/DL
CREAT SERPL-MCNC: 0.79 MG/DL (ref 0.51–0.95)
EGFRCR SERPLBLD CKD-EPI 2021: >90 ML/MIN/1.73M2
ERYTHROCYTE [DISTWIDTH] IN BLOOD BY AUTOMATED COUNT: 12.3 % (ref 10–15)
FASTING STATUS PATIENT QL REPORTED: YES
FASTING STATUS PATIENT QL REPORTED: YES
GLUCOSE SERPL-MCNC: 116 MG/DL (ref 70–99)
HCO3 SERPL-SCNC: 22 MMOL/L (ref 22–29)
HCT VFR BLD AUTO: 38.5 % (ref 35–47)
HDLC SERPL-MCNC: 53 MG/DL
HGB BLD-MCNC: 12.8 G/DL (ref 11.7–15.7)
LDLC SERPL CALC-MCNC: 140 MG/DL
MCH RBC QN AUTO: 31.2 PG (ref 26.5–33)
MCHC RBC AUTO-ENTMCNC: 33.2 G/DL (ref 31.5–36.5)
MCV RBC AUTO: 94 FL (ref 78–100)
NONHDLC SERPL-MCNC: 157 MG/DL
PLATELET # BLD AUTO: 390 10E3/UL (ref 150–450)
POTASSIUM SERPL-SCNC: 4.3 MMOL/L (ref 3.4–5.3)
PROT SERPL-MCNC: 6.8 G/DL (ref 6.4–8.3)
RBC # BLD AUTO: 4.1 10E6/UL (ref 3.8–5.2)
SODIUM SERPL-SCNC: 138 MMOL/L (ref 135–145)
TRIGL SERPL-MCNC: 84 MG/DL
TSH SERPL DL<=0.005 MIU/L-ACNC: 2.09 UIU/ML (ref 0.3–4.2)
WBC # BLD AUTO: 9.6 10E3/UL (ref 4–11)

## 2024-09-04 PROCEDURE — 80061 LIPID PANEL: CPT

## 2024-09-04 PROCEDURE — 80053 COMPREHEN METABOLIC PANEL: CPT

## 2024-09-04 PROCEDURE — 36415 COLL VENOUS BLD VENIPUNCTURE: CPT

## 2024-09-04 PROCEDURE — 85027 COMPLETE CBC AUTOMATED: CPT

## 2024-09-04 PROCEDURE — 84443 ASSAY THYROID STIM HORMONE: CPT

## 2024-09-30 ENCOUNTER — MYC REFILL (OUTPATIENT)
Dept: FAMILY MEDICINE | Facility: CLINIC | Age: 34
End: 2024-09-30
Payer: COMMERCIAL

## 2024-09-30 ENCOUNTER — MYC MEDICAL ADVICE (OUTPATIENT)
Dept: FAMILY MEDICINE | Facility: CLINIC | Age: 34
End: 2024-09-30
Payer: COMMERCIAL

## 2024-09-30 DIAGNOSIS — F90.0 ATTENTION DEFICIT HYPERACTIVITY DISORDER (ADHD), PREDOMINANTLY INATTENTIVE TYPE: ICD-10-CM

## 2024-09-30 RX ORDER — METHYLPHENIDATE HYDROCHLORIDE 36 MG/1
36 TABLET ORAL DAILY
Qty: 30 TABLET | Refills: 0 | OUTPATIENT
Start: 2024-09-30

## 2024-10-11 DIAGNOSIS — K21.9 GASTROESOPHAGEAL REFLUX DISEASE WITHOUT ESOPHAGITIS: ICD-10-CM

## 2024-11-14 DIAGNOSIS — E03.9 HYPOTHYROIDISM, UNSPECIFIED TYPE: ICD-10-CM

## 2024-11-14 DIAGNOSIS — F33.41 RECURRENT MAJOR DEPRESSIVE DISORDER, IN PARTIAL REMISSION (H): ICD-10-CM

## 2024-11-14 DIAGNOSIS — F40.10 SOCIAL PHOBIA: ICD-10-CM

## 2024-11-14 DIAGNOSIS — F41.9 ANXIETY: ICD-10-CM

## 2024-11-14 RX ORDER — LEVOTHYROXINE SODIUM 50 UG/1
50 TABLET ORAL DAILY
Qty: 90 TABLET | Refills: 1 | Status: SHIPPED | OUTPATIENT
Start: 2024-11-14

## 2024-11-15 RX ORDER — PROPRANOLOL HYDROCHLORIDE 60 MG/1
60 CAPSULE, EXTENDED RELEASE ORAL DAILY
Qty: 30 CAPSULE | Refills: 1 | Status: SHIPPED | OUTPATIENT
Start: 2024-11-15

## 2024-11-15 RX ORDER — PAROXETINE 40 MG/1
40 TABLET, FILM COATED ORAL EVERY MORNING
Qty: 30 TABLET | Refills: 1 | Status: SHIPPED | OUTPATIENT
Start: 2024-11-15

## 2024-11-17 DIAGNOSIS — Z30.09 COUNSELING FOR BIRTH CONTROL, ORAL CONTRACEPTIVES: ICD-10-CM

## 2024-11-18 RX ORDER — NORETHINDRONE ACETATE AND ETHINYL ESTRADIOL 1; 20 MG/1; UG/1
1 TABLET ORAL DAILY
Qty: 84 TABLET | Refills: 2 | Status: SHIPPED | OUTPATIENT
Start: 2024-11-18

## 2024-12-31 ENCOUNTER — MYC REFILL (OUTPATIENT)
Dept: FAMILY MEDICINE | Facility: CLINIC | Age: 34
End: 2024-12-31
Payer: COMMERCIAL

## 2024-12-31 DIAGNOSIS — F90.0 ATTENTION DEFICIT HYPERACTIVITY DISORDER (ADHD), PREDOMINANTLY INATTENTIVE TYPE: ICD-10-CM

## 2024-12-31 RX ORDER — METHYLPHENIDATE HYDROCHLORIDE 36 MG/1
36 TABLET ORAL DAILY
Qty: 30 TABLET | Refills: 0 | Status: SHIPPED | OUTPATIENT
Start: 2024-12-31

## 2025-01-16 DIAGNOSIS — F33.41 RECURRENT MAJOR DEPRESSIVE DISORDER, IN PARTIAL REMISSION: ICD-10-CM

## 2025-01-16 DIAGNOSIS — F40.10 SOCIAL PHOBIA: ICD-10-CM

## 2025-01-16 DIAGNOSIS — F41.9 ANXIETY: ICD-10-CM

## 2025-01-16 RX ORDER — PAROXETINE 40 MG/1
40 TABLET, FILM COATED ORAL EVERY MORNING
Qty: 30 TABLET | Refills: 0 | Status: SHIPPED | OUTPATIENT
Start: 2025-01-16

## 2025-01-16 RX ORDER — PROPRANOLOL HYDROCHLORIDE 60 MG/1
60 CAPSULE, EXTENDED RELEASE ORAL DAILY
Qty: 30 CAPSULE | Refills: 0 | Status: SHIPPED | OUTPATIENT
Start: 2025-01-16

## 2025-02-12 ENCOUNTER — OFFICE VISIT (OUTPATIENT)
Dept: FAMILY MEDICINE | Facility: CLINIC | Age: 35
End: 2025-02-12
Payer: COMMERCIAL

## 2025-02-12 VITALS
RESPIRATION RATE: 20 BRPM | SYSTOLIC BLOOD PRESSURE: 114 MMHG | DIASTOLIC BLOOD PRESSURE: 81 MMHG | HEIGHT: 64 IN | HEART RATE: 80 BPM | TEMPERATURE: 98.9 F | BODY MASS INDEX: 36.73 KG/M2

## 2025-02-12 DIAGNOSIS — Z00.00 ROUTINE GENERAL MEDICAL EXAMINATION AT A HEALTH CARE FACILITY: Primary | ICD-10-CM

## 2025-02-12 DIAGNOSIS — Z30.09 COUNSELING FOR BIRTH CONTROL, ORAL CONTRACEPTIVES: ICD-10-CM

## 2025-02-12 DIAGNOSIS — F90.9 ATTENTION DEFICIT HYPERACTIVITY DISORDER (ADHD), UNSPECIFIED ADHD TYPE: ICD-10-CM

## 2025-02-12 DIAGNOSIS — K21.9 GASTROESOPHAGEAL REFLUX DISEASE WITHOUT ESOPHAGITIS: ICD-10-CM

## 2025-02-12 DIAGNOSIS — F40.10 SOCIAL PHOBIA: ICD-10-CM

## 2025-02-12 DIAGNOSIS — E03.9 HYPOTHYROIDISM, UNSPECIFIED TYPE: ICD-10-CM

## 2025-02-12 DIAGNOSIS — F41.9 ANXIETY: ICD-10-CM

## 2025-02-12 DIAGNOSIS — F33.41 RECURRENT MAJOR DEPRESSIVE DISORDER, IN PARTIAL REMISSION: ICD-10-CM

## 2025-02-12 PROCEDURE — 99395 PREV VISIT EST AGE 18-39: CPT | Performed by: PHYSICIAN ASSISTANT

## 2025-02-12 PROCEDURE — 99214 OFFICE O/P EST MOD 30 MIN: CPT | Mod: 25 | Performed by: PHYSICIAN ASSISTANT

## 2025-02-12 PROCEDURE — G2211 COMPLEX E/M VISIT ADD ON: HCPCS | Performed by: PHYSICIAN ASSISTANT

## 2025-02-12 RX ORDER — PAROXETINE 40 MG/1
40 TABLET, FILM COATED ORAL EVERY MORNING
Qty: 90 TABLET | Refills: 3 | Status: SHIPPED | OUTPATIENT
Start: 2025-02-12

## 2025-02-12 RX ORDER — LEVOTHYROXINE SODIUM 50 UG/1
50 TABLET ORAL DAILY
Qty: 90 TABLET | Refills: 2 | Status: SHIPPED | OUTPATIENT
Start: 2025-02-12

## 2025-02-12 RX ORDER — METHYLPHENIDATE HYDROCHLORIDE 36 MG/1
36 TABLET ORAL DAILY
Qty: 30 TABLET | Refills: 0 | Status: SHIPPED | OUTPATIENT
Start: 2025-04-13 | End: 2025-05-13

## 2025-02-12 RX ORDER — METHYLPHENIDATE HYDROCHLORIDE 36 MG/1
36 TABLET ORAL DAILY
Qty: 30 TABLET | Refills: 0 | Status: SHIPPED | OUTPATIENT
Start: 2025-03-14 | End: 2025-04-13

## 2025-02-12 RX ORDER — OMEPRAZOLE 20 MG/1
20 CAPSULE, DELAYED RELEASE ORAL DAILY
Qty: 90 CAPSULE | Refills: 2 | Status: SHIPPED | OUTPATIENT
Start: 2025-02-12

## 2025-02-12 RX ORDER — METHYLPHENIDATE HYDROCHLORIDE 36 MG/1
36 TABLET ORAL DAILY
Qty: 30 TABLET | Refills: 0 | Status: SHIPPED | OUTPATIENT
Start: 2025-02-12 | End: 2025-03-14

## 2025-02-12 RX ORDER — PROPRANOLOL HYDROCHLORIDE 60 MG/1
60 CAPSULE, EXTENDED RELEASE ORAL DAILY
Qty: 90 CAPSULE | Refills: 3 | Status: SHIPPED | OUTPATIENT
Start: 2025-02-12

## 2025-02-12 RX ORDER — NORETHINDRONE ACETATE AND ETHINYL ESTRADIOL 1; 20 MG/1; UG/1
1 TABLET ORAL DAILY
Qty: 84 TABLET | Refills: 2 | Status: SHIPPED | OUTPATIENT
Start: 2025-02-12

## 2025-02-12 SDOH — HEALTH STABILITY: PHYSICAL HEALTH: ON AVERAGE, HOW MANY DAYS PER WEEK DO YOU ENGAGE IN MODERATE TO STRENUOUS EXERCISE (LIKE A BRISK WALK)?: 3 DAYS

## 2025-02-12 ASSESSMENT — SOCIAL DETERMINANTS OF HEALTH (SDOH): HOW OFTEN DO YOU GET TOGETHER WITH FRIENDS OR RELATIVES?: ONCE A WEEK

## 2025-02-12 ASSESSMENT — PATIENT HEALTH QUESTIONNAIRE - PHQ9
SUM OF ALL RESPONSES TO PHQ QUESTIONS 1-9: 8
10. IF YOU CHECKED OFF ANY PROBLEMS, HOW DIFFICULT HAVE THESE PROBLEMS MADE IT FOR YOU TO DO YOUR WORK, TAKE CARE OF THINGS AT HOME, OR GET ALONG WITH OTHER PEOPLE: VERY DIFFICULT
SUM OF ALL RESPONSES TO PHQ QUESTIONS 1-9: 8

## 2025-02-12 NOTE — PROGRESS NOTES
Preventive Care Visit  [facility], Family Medicine  Feb 12, 2025      Assessment & Plan     Routine general medical examination at a health care facility  Age and gender appropriate preventive care and screenings are discussed.  Particular attention to personal preventive care and age appropriate lifestyle including the incorporation of healthy diet and physical activity is made       Gastroesophageal reflux disease without esophagitis  Pt request   - omeprazole (PRILOSEC) 20 MG DR capsule; Take 1 capsule (20 mg) by mouth daily.    Social phobia  Stable   - PARoxetine (PAXIL) 40 MG tablet; Take 1 tablet (40 mg) by mouth every morning.  - propranolol ER (INDERAL LA) 60 MG 24 hr capsule; Take 1 capsule (60 mg) by mouth daily.    Anxiety  Stable   - PARoxetine (PAXIL) 40 MG tablet; Take 1 tablet (40 mg) by mouth every morning.    Recurrent major depressive disorder, in partial remission  Stable   - PARoxetine (PAXIL) 40 MG tablet; Take 1 tablet (40 mg) by mouth every morning.    Attention deficit hyperactivity disorder (ADHD), unspecified ADHD type  Patient given postdated prescriptions for 3 months time. Follow-up in 6 months for medication recheck.  Continue non-medication behavioral modifications to improve attention and organization including having a regular schedule, breaking projects into smaller manageable time periods, work in quiet distraction free environment, achieve regular sleep pattern and healthy diet, remain physically active, as well as using lists, reminders, and calendars.     - methylphenidate HCl ER, OSM, (CONCERTA) 36 MG CR tablet; Take 1 tablet (36 mg) by mouth daily.  - methylphenidate HCl ER, OSM, (CONCERTA) 36 MG CR tablet; Take 1 tablet (36 mg) by mouth daily.  - methylphenidate HCl ER, OSM, (CONCERTA) 36 MG CR tablet; Take 1 tablet (36 mg) by mouth daily.    Counseling for birth control, oral contraceptives    - JUNEL 1/20 1-20  MG-MCG tablet; Take 1 tablet by mouth daily.    Hypothyroidism, unspecified type  Stable   - levothyroxine (SYNTHROID/LEVOTHROID) 50 MCG tablet; Take 1 tablet (50 mcg) by mouth daily.            Counseling  Appropriate preventive services were addressed with this patient via screening, questionnaire, or discussion as appropriate for fall prevention, nutrition, physical activity, Tobacco-use cessation, social engagement, weight loss and cognition.  Checklist reviewing preventive services available has been given to the patient.  Reviewed patient's diet, addressing concerns and/or questions.   She is at risk for lack of exercise and has been provided with information to increase physical activity for the benefit of her well-being.   She is at risk for psychosocial distress and has been provided with information to reduce risk.   The patient's PHQ-9 score is consistent with mild depression. She was provided with information regarding depression.           Nilay Trinh is a 34 year old, presenting for the following:  Physical (nonfasting)        2/12/2025    12:57 PM   Additional Questions   Roomed by Brayden   Accompanied by self      ADHD: Taking  concerta 36 mg   Working well at this dosage.  Denies tics, palpitations, insomnia or trouble with appetite      HPI          Health Care Directive  Patient does not have a Health Care Directive: Discussed advance care planning with patient; information given to patient to review.      2/12/2025   General Health   How would you rate your overall physical health? Good   Feel stress (tense, anxious, or unable to sleep) Rather much   (!) STRESS CONCERN      2/12/2025   Nutrition   Three or more servings of calcium each day? (!) NO   Diet: I don't know   How many servings of fruit and vegetables per day? (!) 0-1   How many sweetened beverages each day? 0-1         2/12/2025   Exercise   Days per week of moderate/strenous exercise 3 days         2/12/2025   Social Factors    Frequency of gathering with friends or relatives Once a week   Worry food won't last until get money to buy more No   Food not last or not have enough money for food? No   Do you have housing? (Housing is defined as stable permanent housing and does not include staying ouside in a car, in a tent, in an abandoned building, in an overnight shelter, or couch-surfing.) Yes   Are you worried about losing your housing? No   Lack of transportation? No   Unable to get utilities (heat,electricity)? No         2/12/2025   Dental   Dentist two times every year? Yes          Today's PHQ-9 Score:       2/12/2025    12:52 PM   PHQ-9 SCORE   PHQ-9 Total Score MyChart 8 (Mild depression)   PHQ-9 Total Score 8        Patient-reported         2/12/2025   Substance Use   Alcohol more than 3/day or more than 7/wk No   Do you use any other substances recreationally? No     Social History     Tobacco Use    Smoking status: Former     Types: Cigarettes    Smokeless tobacco: Never   Vaping Use    Vaping status: Some Days    Substances: CBD, Flavoring   Substance Use Topics    Alcohol use: Yes     Alcohol/week: 0.0 standard drinks of alcohol     Comment: occ    Drug use: No           11/1/2023   LAST FHS-7 RESULTS   1st degree relative breast or ovarian cancer No    Any relative bilateral breast cancer No    Any male have breast cancer No    Any ONE woman have BOTH breast AND ovarian cancer No    Any woman with breast cancer before 50yrs No    2 or more relatives with breast AND/OR ovarian cancer No    2 or more relatives with breast AND/OR bowel cancer No        Proxy-reported        Mammogram Screening - Patient under 40 years of age: Routine Mammogram Screening not recommended.         2/12/2025   STI Screening   New sexual partner(s) since last STI/HIV test? No     History of abnormal Pap smear: No - age 21-29 PAP every 3 years recommended        Latest Ref Rng & Units 9/7/2022     1:13 PM 4/8/2019     8:42 AM 5/19/2016    12:00 AM  "  PAP / HPV   PAP  Negative for Intraepithelial Lesion or Malignancy (NILM)      PAP (Historical)   NIL  NIL    HPV 16 DNA Negative Negative      HPV 18 DNA Negative Negative      Other HR HPV Negative Negative              2/12/2025   Contraception/Family Planning   Questions about contraception or family planning No        Reviewed and updated as needed this visit by Provider                    BP Readings from Last 3 Encounters:   02/12/25 114/81   11/01/23 104/74   02/01/23 120/80    Wt Readings from Last 3 Encounters:   02/01/23 97.1 kg (214 lb)   09/07/22 97.1 kg (214 lb)   04/06/22 86.2 kg (190 lb)                      Review of Systems  CONSTITUTIONAL: NEGATIVE for fever, chills, change in weight  INTEGUMENTARY/SKIN: NEGATIVE for worrisome rashes, moles or lesions  EYES: NEGATIVE for vision changes or irritation  ENT/MOUTH: NEGATIVE for ear, mouth and throat problems  RESP: NEGATIVE for significant cough or SOB  BREAST: NEGATIVE for masses, tenderness or discharge  CV: NEGATIVE for chest pain, palpitations or peripheral edema  GI: NEGATIVE for nausea, abdominal pain, heartburn, or change in bowel habits  : NEGATIVE for frequency, dysuria, or hematuria  MUSCULOSKELETAL: NEGATIVE for significant arthralgias or myalgia  NEURO: NEGATIVE for weakness, dizziness or paresthesias  ENDOCRINE: NEGATIVE for temperature intolerance, skin/hair changes  HEME: NEGATIVE for bleeding problems  PSYCHIATRIC: NEGATIVE for changes in mood or affect     Objective    Exam  /81 (BP Location: Right arm, Patient Position: Sitting, Cuff Size: Adult Large)   Pulse 80   Temp 98.9  F (37.2  C) (Oral)   Resp 20   Ht 1.626 m (5' 4\")   LMP 01/15/2025 (Approximate)   BMI 36.73 kg/m     Estimated body mass index is 36.73 kg/m  as calculated from the following:    Height as of this encounter: 1.626 m (5' 4\").    Weight as of 2/1/23: 97.1 kg (214 lb).    Physical Exam  GENERAL: alert and no distress  EYES: Eyes grossly normal to " inspection, PERRL and conjunctivae and sclerae normal  HENT: ear canals and TM's normal, nose and mouth without ulcers or lesions  NECK: no adenopathy, no asymmetry, masses, or scars  RESP: lungs clear to auscultation - no rales, rhonchi or wheezes  BREAST: normal without masses, tenderness or nipple discharge and no palpable axillary masses or adenopathy  CV: regular rate and rhythm, normal S1 S2, no S3 or S4, no murmur, click or rub, no peripheral edema  ABDOMEN: soft, nontender, no hepatosplenomegaly, no masses and bowel sounds normal  MS: no gross musculoskeletal defects noted, no edema  SKIN: no suspicious lesions or rashes  NEURO: Normal strength and tone, mentation intact and speech normal  PSYCH: mentation appears normal, affect normal/bright        Signed Electronically by: Ramona Ann Aaseby-Aguilera, PA-C    Answers submitted by the patient for this visit:  Patient Health Questionnaire (Submitted on 2/12/2025)  If you checked off any problems, how difficult have these problems made it for you to do your work, take care of things at home, or get along with other people?: Very difficult  PHQ9 TOTAL SCORE: 8

## 2025-02-12 NOTE — PATIENT INSTRUCTIONS
Patient Education   Preventive Care Advice   This is general advice given by our system to help you stay healthy. However, your care team may have specific advice just for you. Please talk to your care team about your preventive care needs.  Nutrition  Eat 5 or more servings of fruits and vegetables each day.  Try wheat bread, brown rice and whole grain pasta (instead of white bread, rice, and pasta).  Get enough calcium and vitamin D. Check the label on foods and aim for 100% of the RDA (recommended daily allowance).  Lifestyle  Exercise at least 150 minutes each week  (30 minutes a day, 5 days a week).  Do muscle strengthening activities 2 days a week. These help control your weight and prevent disease.  No smoking.  Wear sunscreen to prevent skin cancer.  Have a dental exam and cleaning every 6 months.  Yearly exams  See your health care team every year to talk about:  Any changes in your health.  Any medicines your care team has prescribed.  Preventive care, family planning, and ways to prevent chronic diseases.  Shots (vaccines)   HPV shots (up to age 26), if you've never had them before.  Hepatitis B shots (up to age 59), if you've never had them before.  COVID-19 shot: Get this shot when it's due.  Flu shot: Get a flu shot every year.  Tetanus shot: Get a tetanus shot every 10 years.  Pneumococcal, hepatitis A, and RSV shots: Ask your care team if you need these based on your risk.  Shingles shot (for age 50 and up)  General health tests  Diabetes screening:  Starting at age 35, Get screened for diabetes at least every 3 years.  If you are younger than age 35, ask your care team if you should be screened for diabetes.  Cholesterol test: At age 39, start having a cholesterol test every 5 years, or more often if advised.  Bone density scan (DEXA): At age 50, ask your care team if you should have this scan for osteoporosis (brittle bones).  Hepatitis C: Get tested at least once in your life.  STIs (sexually  transmitted infections)  Before age 24: Ask your care team if you should be screened for STIs.  After age 24: Get screened for STIs if you're at risk. You are at risk for STIs (including HIV) if:  You are sexually active with more than one person.  You don't use condoms every time.  You or a partner was diagnosed with a sexually transmitted infection.  If you are at risk for HIV, ask about PrEP medicine to prevent HIV.  Get tested for HIV at least once in your life, whether you are at risk for HIV or not.  Cancer screening tests  Cervical cancer screening: If you have a cervix, begin getting regular cervical cancer screening tests starting at age 21.  Breast cancer scan (mammogram): If you've ever had breasts, begin having regular mammograms starting at age 40. This is a scan to check for breast cancer.  Colon cancer screening: It is important to start screening for colon cancer at age 45.  Have a colonoscopy test every 10 years (or more often if you're at risk) Or, ask your provider about stool tests like a FIT test every year or Cologuard test every 3 years.  To learn more about your testing options, visit:   .  For help making a decision, visit:   https://bit.ly/xz01969.  Prostate cancer screening test: If you have a prostate, ask your care team if a prostate cancer screening test (PSA) at age 55 is right for you.  Lung cancer screening: If you are a current or former smoker ages 50 to 80, ask your care team if ongoing lung cancer screenings are right for you.  For informational purposes only. Not to replace the advice of your health care provider. Copyright   2023 [facility]. All rights reserved. Clinically reviewed by the [facility] Transitions Program. [employer] [id number] - REV 01/24.  Learning About Stress  What is stress?     Stress is your body's response to a hard situation. Your body can have a physical, emotional, or mental response. Stress is a fact of life for most people, and it  affects everyone differently. What causes stress for you may not be stressful for someone else.  A lot of things can cause stress. You may feel stress when you go on a job interview, take a test, or run a race. This kind of short-term stress is normal and even useful. It can help you if you need to work hard or react quickly. For example, stress can help you finish an important job on time.  Long-term stress is caused by ongoing stressful situations or events. Examples of long-term stress include long-term health problems, ongoing problems at work, or conflicts in your family. Long-term stress can harm your health.  How does stress affect your health?  When you are stressed, your body responds as though you are in danger. It makes hormones that speed up your heart, make you breathe faster, and give you a burst of energy. This is called the fight-or-flight stress response. If the stress is over quickly, your body goes back to normal and no harm is done.  But if stress happens too often or lasts too long, it can have bad effects. Long-term stress can make you more likely to get sick, and it can make symptoms of some diseases worse. If you tense up when you are stressed, you may develop neck, shoulder, or low back pain. Stress is linked to high blood pressure and heart disease.  Stress also harms your emotional health. It can make you escamilla, tense, or depressed. Your relationships may suffer, and you may not do well at work or school.  What can you do to manage stress?  You can try these things to help manage stress:   Do something active. Exercise or activity can help reduce stress. Walking is a great way to get started. Even everyday activities such as housecleaning or yard work can help.  Try yoga or abelardo chi. These techniques combine exercise and meditation. You may need some training at first to learn them.  Do something you enjoy. For example, listen to music or go to a movie. Practice your hobby or do volunteer  "work.  Meditate. This can help you relax, because you are not worrying about what happened before or what may happen in the future.  Do guided imagery. Imagine yourself in any setting that helps you feel calm. You can use online videos, books, or a teacher to guide you.  Do breathing exercises. For example:  From a standing position, bend forward from the waist with your knees slightly bent. Let your arms dangle close to the floor.  Breathe in slowly and deeply as you return to a standing position. Roll up slowly and lift your head last.  Hold your breath for just a few seconds in the standing position.  Breathe out slowly and bend forward from the waist.  Let your feelings out. Talk, laugh, cry, and express anger when you need to. Talking with supportive friends or family, a counselor, or a beto leader about your feelings is a healthy way to relieve stress. Avoid discussing your feelings with people who make you feel worse.  Write. It may help to write about things that are bothering you. This helps you find out how much stress you feel and what is causing it. When you know this, you can find better ways to cope.  What can you do to prevent stress?  You might try some of these things to help prevent stress:  Manage your time. This helps you find time to do the things you want and need to do.  Get enough sleep. Your body recovers from the stresses of the day while you are sleeping.  Get support. Your family, friends, and community can make a difference in how you experience stress.  Limit your news feed. Avoid or limit time on social media or news that may make you feel stressed.  Do something active. Exercise or activity can help reduce stress. Walking is a great way to get started.  Where can you learn more?  Go to https://www.[employer].net/patiented  Enter N032 in the search box to learn more about \"Learning About Stress.\"  Current as of: October 24, 2023  Content Version: 14.3    2024 [employer]. "   Care instructions adapted under license by your healthcare professional. If you have questions about a medical condition or this instruction, always ask your healthcare professional. [employer] disclaims any warranty or liability for your use of this information.    Learning About Depression Screening  What is depression screening?  Depression screening is a way to see if you have depression symptoms. It may be done by a doctor or counselor. It's often part of a routine checkup. That's because your mental health is just as important as your physical health.  Depression is a mental health condition that affects how you feel, think, and act. You may:  Have less energy.  Lose interest in your daily activities.  Feel sad and grouchy for a long time.  Depression is very common. It affects people of all ages.  Many things can lead to depression. Some people become depressed after they have a stroke or find out they have a major illness like cancer or heart disease. The death of a loved one or a breakup may lead to depression. It can run in families. Most experts believe that a combination of inherited genes and stressful life events can cause it.  What happens during screening?  You may be asked to fill out a form about your depression symptoms. You and the doctor will discuss your answers. The doctor may ask you more questions to learn more about how you think, act, and feel.  What happens after screening?  If you have symptoms of depression, your doctor will talk to you about your options.  Doctors usually treat depression with medicines or counseling. Often, combining the two works best. Many people don't get help because they think that they'll get over the depression on their own. But people with depression may not get better unless they get treatment.  The cause of depression is not well understood. There may be many factors involved. But if you have depression, it's not your fault.  A serious symptom  "of depression is thinking about death or suicide. If you or someone you care about talks about this or about feeling hopeless, get help right away.  It's important to know that depression can be treated. Medicine, counseling, and self-care may help.  Where can you learn more?  Go to https://www.[employer].net/patiented  Enter T185 in the search box to learn more about \"Learning About Depression Screening.\"  Current as of: July 31, 2024  Content Version: 14.3    2024 [employer].   Care instructions adapted under license by your healthcare professional. If you have questions about a medical condition or this instruction, always ask your healthcare professional. [employer] disclaims any warranty or liability for your use of this information.       "

## 2025-06-16 ENCOUNTER — MYC REFILL (OUTPATIENT)
Dept: FAMILY MEDICINE | Facility: CLINIC | Age: 35
End: 2025-06-16
Payer: COMMERCIAL

## 2025-06-16 DIAGNOSIS — F90.0 ATTENTION DEFICIT HYPERACTIVITY DISORDER (ADHD), PREDOMINANTLY INATTENTIVE TYPE: ICD-10-CM

## 2025-06-17 RX ORDER — METHYLPHENIDATE HYDROCHLORIDE 36 MG/1
36 TABLET ORAL DAILY
Qty: 30 TABLET | Refills: 0 | Status: SHIPPED | OUTPATIENT
Start: 2025-06-17

## 2025-08-12 ENCOUNTER — PATIENT OUTREACH (OUTPATIENT)
Dept: CARE COORDINATION | Facility: CLINIC | Age: 35
End: 2025-08-12
Payer: COMMERCIAL

## 2025-08-17 ENCOUNTER — MYC REFILL (OUTPATIENT)
Dept: FAMILY MEDICINE | Facility: CLINIC | Age: 35
End: 2025-08-17
Payer: COMMERCIAL

## 2025-08-17 DIAGNOSIS — F90.0 ATTENTION DEFICIT HYPERACTIVITY DISORDER (ADHD), PREDOMINANTLY INATTENTIVE TYPE: ICD-10-CM

## 2025-08-18 RX ORDER — METHYLPHENIDATE HYDROCHLORIDE 36 MG/1
36 TABLET ORAL DAILY
Qty: 30 TABLET | Refills: 0 | OUTPATIENT
Start: 2025-08-18

## (undated) DEVICE — GOWN XXL 9575

## (undated) DEVICE — ESU ELEC BLADE 2.75" COATED/INSULATED E1455

## (undated) DEVICE — IONM UP TO 7 HOURS

## (undated) DEVICE — DRAPE SHEET REV FOLD 3/4 9349

## (undated) DEVICE — MANIFOLD NEPTUNE 4 PORT 700-20

## (undated) DEVICE — GLOVE PROTEXIS BLUE W/NEU-THERA 8.5  2D73EB85

## (undated) DEVICE — Device

## (undated) DEVICE — ESU ELEC NDL 1" COATED/INSULATED E1465

## (undated) DEVICE — DRAPE C-ARM 60X42" 1013

## (undated) DEVICE — MIDAS REX DISSECTING TOOL  14MH30

## (undated) DEVICE — IONM SUPPLIES

## (undated) DEVICE — PACK SPINE SM CUSTOM SNE15SSFSK

## (undated) DEVICE — SU VICRYL 4-0 PS-2 18" UND J496H

## (undated) DEVICE — PREP DURAPREP 26ML APL 8630

## (undated) DEVICE — GLOVE PROTEXIS BLUE W/NEU-THERA 8.0  2D73EB80

## (undated) DEVICE — SYR EAR BULB 3OZ 0035830

## (undated) DEVICE — BLADE KNIFE SURG 10 371110

## (undated) RX ORDER — PROPOFOL 10 MG/ML
INJECTION, EMULSION INTRAVENOUS
Status: DISPENSED
Start: 2019-11-20

## (undated) RX ORDER — CEFAZOLIN SODIUM 1 G/3ML
INJECTION, POWDER, FOR SOLUTION INTRAMUSCULAR; INTRAVENOUS
Status: DISPENSED
Start: 2019-11-20

## (undated) RX ORDER — CEFAZOLIN SODIUM 2 G/100ML
INJECTION, SOLUTION INTRAVENOUS
Status: DISPENSED
Start: 2019-11-20

## (undated) RX ORDER — SCOLOPAMINE TRANSDERMAL SYSTEM 1 MG/1
PATCH, EXTENDED RELEASE TRANSDERMAL
Status: DISPENSED
Start: 2019-11-20

## (undated) RX ORDER — REMIFENTANIL HYDROCHLORIDE 1 MG/ML
INJECTION, POWDER, LYOPHILIZED, FOR SOLUTION INTRAVENOUS
Status: DISPENSED
Start: 2019-11-20

## (undated) RX ORDER — DEXAMETHASONE SODIUM PHOSPHATE 4 MG/ML
INJECTION, SOLUTION INTRA-ARTICULAR; INTRALESIONAL; INTRAMUSCULAR; INTRAVENOUS; SOFT TISSUE
Status: DISPENSED
Start: 2019-11-20

## (undated) RX ORDER — FENTANYL CITRATE 50 UG/ML
INJECTION, SOLUTION INTRAMUSCULAR; INTRAVENOUS
Status: DISPENSED
Start: 2019-11-20

## (undated) RX ORDER — LIDOCAINE HYDROCHLORIDE 20 MG/ML
INJECTION, SOLUTION EPIDURAL; INFILTRATION; INTRACAUDAL; PERINEURAL
Status: DISPENSED
Start: 2019-11-20